# Patient Record
Sex: FEMALE | Race: WHITE | NOT HISPANIC OR LATINO | Employment: OTHER | URBAN - NONMETROPOLITAN AREA
[De-identification: names, ages, dates, MRNs, and addresses within clinical notes are randomized per-mention and may not be internally consistent; named-entity substitution may affect disease eponyms.]

---

## 2017-03-20 ENCOUNTER — HOSPITAL ENCOUNTER (EMERGENCY)
Facility: HOSPITAL | Age: 72
Discharge: HOME | End: 2017-03-20
Attending: EMERGENCY MEDICINE | Admitting: EMERGENCY MEDICINE
Payer: MEDICARE

## 2017-03-20 VITALS
RESPIRATION RATE: 16 BRPM | OXYGEN SATURATION: 94 % | HEART RATE: 87 BPM | SYSTOLIC BLOOD PRESSURE: 136 MMHG | TEMPERATURE: 98.7 F | DIASTOLIC BLOOD PRESSURE: 77 MMHG

## 2017-03-20 DIAGNOSIS — M89.8X1 PAIN OF RIGHT SCAPULA: ICD-10-CM

## 2017-03-20 DIAGNOSIS — R07.89 CHEST WALL PAIN: ICD-10-CM

## 2017-03-20 DIAGNOSIS — G89.18 POSTOPERATIVE PAIN: ICD-10-CM

## 2017-03-20 LAB
ALBUMIN SERPL-MCNC: 3.3 G/DL (ref 3.4–5)
ALP SERPL-CCNC: 83 U/L (ref 40–150)
ALT SERPL W P-5'-P-CCNC: 27 U/L (ref 0–50)
ANION GAP SERPL CALCULATED.3IONS-SCNC: 10 MMOL/L (ref 3–14)
AST SERPL W P-5'-P-CCNC: 15 U/L (ref 0–45)
BASOPHILS # BLD AUTO: 0 10E9/L (ref 0–0.2)
BASOPHILS NFR BLD AUTO: 0.2 %
BILIRUB SERPL-MCNC: 0.3 MG/DL (ref 0.2–1.3)
BUN SERPL-MCNC: 27 MG/DL (ref 7–30)
CALCIUM SERPL-MCNC: 8.8 MG/DL (ref 8.5–10.1)
CHLORIDE SERPL-SCNC: 107 MMOL/L (ref 94–109)
CO2 SERPL-SCNC: 22 MMOL/L (ref 20–32)
CREAT SERPL-MCNC: 1.31 MG/DL (ref 0.52–1.04)
CRP SERPL-MCNC: 12.8 MG/L (ref 0–8)
D DIMER PPP DDU-MCNC: 241 NG/ML D-DU (ref 0–300)
DIFFERENTIAL METHOD BLD: ABNORMAL
EOSINOPHIL # BLD AUTO: 0.3 10E9/L (ref 0–0.7)
EOSINOPHIL NFR BLD AUTO: 4.1 %
ERYTHROCYTE [DISTWIDTH] IN BLOOD BY AUTOMATED COUNT: 14.2 % (ref 10–15)
GFR SERPL CREATININE-BSD FRML MDRD: 40 ML/MIN/1.7M2
GLUCOSE SERPL-MCNC: 174 MG/DL (ref 70–99)
HCT VFR BLD AUTO: 32.6 % (ref 35–47)
HGB BLD-MCNC: 10.3 G/DL (ref 11.7–15.7)
IMM GRANULOCYTES # BLD: 0 10E9/L (ref 0–0.4)
IMM GRANULOCYTES NFR BLD: 0.5 %
LYMPHOCYTES # BLD AUTO: 2.2 10E9/L (ref 0.8–5.3)
LYMPHOCYTES NFR BLD AUTO: 27.5 %
MCH RBC QN AUTO: 25.1 PG (ref 26.5–33)
MCHC RBC AUTO-ENTMCNC: 31.6 G/DL (ref 31.5–36.5)
MCV RBC AUTO: 79 FL (ref 78–100)
MONOCYTES # BLD AUTO: 0.6 10E9/L (ref 0–1.3)
MONOCYTES NFR BLD AUTO: 7.3 %
NEUTROPHILS # BLD AUTO: 4.9 10E9/L (ref 1.6–8.3)
NEUTROPHILS NFR BLD AUTO: 60.4 %
NRBC # BLD AUTO: 0 10*3/UL
NRBC BLD AUTO-RTO: 0 /100
NT-PROBNP SERPL-MCNC: 55 PG/ML (ref 0–900)
PLATELET # BLD AUTO: 146 10E9/L (ref 150–450)
POTASSIUM SERPL-SCNC: 4 MMOL/L (ref 3.4–5.3)
PROT SERPL-MCNC: 7.4 G/DL (ref 6.8–8.8)
RBC # BLD AUTO: 4.11 10E12/L (ref 3.8–5.2)
SODIUM SERPL-SCNC: 139 MMOL/L (ref 133–144)
TROPONIN I SERPL-MCNC: NORMAL UG/L (ref 0–0.04)
WBC # BLD AUTO: 8.1 10E9/L (ref 4–11)

## 2017-03-20 PROCEDURE — 36415 COLL VENOUS BLD VENIPUNCTURE: CPT | Performed by: EMERGENCY MEDICINE

## 2017-03-20 PROCEDURE — 85379 FIBRIN DEGRADATION QUANT: CPT | Performed by: EMERGENCY MEDICINE

## 2017-03-20 PROCEDURE — 99285 EMERGENCY DEPT VISIT HI MDM: CPT | Mod: 25

## 2017-03-20 PROCEDURE — 85025 COMPLETE CBC W/AUTO DIFF WBC: CPT | Performed by: EMERGENCY MEDICINE

## 2017-03-20 PROCEDURE — 86140 C-REACTIVE PROTEIN: CPT | Performed by: EMERGENCY MEDICINE

## 2017-03-20 PROCEDURE — 93005 ELECTROCARDIOGRAM TRACING: CPT

## 2017-03-20 PROCEDURE — 25000128 H RX IP 250 OP 636: Performed by: EMERGENCY MEDICINE

## 2017-03-20 PROCEDURE — 96361 HYDRATE IV INFUSION ADD-ON: CPT

## 2017-03-20 PROCEDURE — 80053 COMPREHEN METABOLIC PANEL: CPT | Performed by: EMERGENCY MEDICINE

## 2017-03-20 PROCEDURE — 93010 ELECTROCARDIOGRAM REPORT: CPT | Performed by: INTERNAL MEDICINE

## 2017-03-20 PROCEDURE — 96375 TX/PRO/DX INJ NEW DRUG ADDON: CPT

## 2017-03-20 PROCEDURE — 83880 ASSAY OF NATRIURETIC PEPTIDE: CPT | Performed by: EMERGENCY MEDICINE

## 2017-03-20 PROCEDURE — 71020 ZZHC CHEST TWO VIEWS, FRONT/LAT: CPT | Mod: TC

## 2017-03-20 PROCEDURE — 84484 ASSAY OF TROPONIN QUANT: CPT | Performed by: EMERGENCY MEDICINE

## 2017-03-20 PROCEDURE — 99285 EMERGENCY DEPT VISIT HI MDM: CPT | Performed by: EMERGENCY MEDICINE

## 2017-03-20 PROCEDURE — 96374 THER/PROPH/DIAG INJ IV PUSH: CPT

## 2017-03-20 RX ORDER — DIPHENHYDRAMINE HYDROCHLORIDE 50 MG/ML
25 INJECTION INTRAMUSCULAR; INTRAVENOUS ONCE
Status: COMPLETED | OUTPATIENT
Start: 2017-03-20 | End: 2017-03-20

## 2017-03-20 RX ORDER — DIAZEPAM 10 MG/2ML
2.5 INJECTION, SOLUTION INTRAMUSCULAR; INTRAVENOUS ONCE
Status: COMPLETED | OUTPATIENT
Start: 2017-03-20 | End: 2017-03-20

## 2017-03-20 RX ORDER — LIDOCAINE 40 MG/G
CREAM TOPICAL
Status: DISCONTINUED | OUTPATIENT
Start: 2017-03-20 | End: 2017-03-20 | Stop reason: HOSPADM

## 2017-03-20 RX ORDER — DIAZEPAM 5 MG
5 TABLET ORAL EVERY 12 HOURS PRN
Qty: 12 TABLET | Refills: 0 | Status: SHIPPED | OUTPATIENT
Start: 2017-03-20 | End: 2017-05-20

## 2017-03-20 RX ORDER — SODIUM CHLORIDE 9 MG/ML
1000 INJECTION, SOLUTION INTRAVENOUS CONTINUOUS
Status: DISCONTINUED | OUTPATIENT
Start: 2017-03-20 | End: 2017-03-20 | Stop reason: HOSPADM

## 2017-03-20 RX ORDER — KETOROLAC TROMETHAMINE 15 MG/ML
15 INJECTION, SOLUTION INTRAMUSCULAR; INTRAVENOUS ONCE
Status: COMPLETED | OUTPATIENT
Start: 2017-03-20 | End: 2017-03-20

## 2017-03-20 RX ORDER — HYDROMORPHONE HYDROCHLORIDE 2 MG/1
2 TABLET ORAL EVERY 6 HOURS PRN
Qty: 15 TABLET | Refills: 0 | Status: SHIPPED | OUTPATIENT
Start: 2017-03-20 | End: 2017-05-20

## 2017-03-20 RX ORDER — METOCLOPRAMIDE HYDROCHLORIDE 5 MG/ML
5 INJECTION INTRAMUSCULAR; INTRAVENOUS ONCE
Status: COMPLETED | OUTPATIENT
Start: 2017-03-20 | End: 2017-03-20

## 2017-03-20 RX ORDER — HYDROMORPHONE HYDROCHLORIDE 1 MG/ML
0.5 INJECTION, SOLUTION INTRAMUSCULAR; INTRAVENOUS; SUBCUTANEOUS
Status: DISCONTINUED | OUTPATIENT
Start: 2017-03-20 | End: 2017-03-20 | Stop reason: HOSPADM

## 2017-03-20 RX ADMIN — KETOROLAC TROMETHAMINE 15 MG: 15 INJECTION, SOLUTION INTRAMUSCULAR; INTRAVENOUS at 08:32

## 2017-03-20 RX ADMIN — METOCLOPRAMIDE 5 MG: 5 INJECTION, SOLUTION INTRAMUSCULAR; INTRAVENOUS at 08:34

## 2017-03-20 RX ADMIN — DIPHENHYDRAMINE HYDROCHLORIDE 25 MG: 50 INJECTION, SOLUTION INTRAMUSCULAR; INTRAVENOUS at 08:36

## 2017-03-20 RX ADMIN — SODIUM CHLORIDE 500 ML: 9 INJECTION, SOLUTION INTRAVENOUS at 08:29

## 2017-03-20 RX ADMIN — DIAZEPAM 2.5 MG: 5 INJECTION, SOLUTION INTRAMUSCULAR; INTRAVENOUS at 08:31

## 2017-03-20 ASSESSMENT — ENCOUNTER SYMPTOMS
ACTIVITY CHANGE: 1
NEUROLOGICAL NEGATIVE: 1
GASTROINTESTINAL NEGATIVE: 1
BACK PAIN: 1
MYALGIAS: 1
CHEST TIGHTNESS: 1
EYES NEGATIVE: 1

## 2017-03-20 NOTE — DISCHARGE INSTRUCTIONS
Pain Management After Surgery  Once you re home after surgery, you may have some pain, since even minor surgery causes swelling and breakdown of tissue. When it comes to effective pain management, the tips you may have learned in the hospital also work at home. To get the best pain relief possible, remember these points:  Special note: Be sure to follow any specific post-operative instructions from your surgeon or nurse.     Use your medicine only as directed    If your pain is not relieved or if it gets worse, call your health care provider.    If pain lessens, try taking your medicine less often or in smaller doses.  Remember that medicines need time to work    Most pain relievers taken by mouth need at least 20 to 30 minutes to take effect. They may not reach their maximum effect for close to an hour.     Take pain medicine at regular times as directed. Don t wait until the pain gets bad to take it.  Time your medicine    Try to time your medicine so that you take it before beginning an activity, such as dressing or sitting at the table for dinner.    Taking your medicine at night may help you get a good night s rest.  Eat lots of fruit and vegetables    Constipation is a common side effect with some pain medicines. Eating fruit, vegetables, and other foods high in fiber can help.    Drink lots of fluids.    Talk to your health care provider about taking a preventive bowel regimen.  Avoid drinking alcohol while taking pain medicine    Mixing alcohol and pain medicine can cause dizziness and slow your respiratory system. It can even be fatal.  Avoid driving or operating machinery while taking pain medicines that can cause drowsiness.    1654-7633 [employer]. [address] [place] [address]. All rights reserved. This information is not intended as a substitute for professional medical care. Always follow your healthcare professional's instructions.      Cee   We checked out your  persistent right scapular and upper back pain since your cervical disc surgery.  There is no evidence that this is a blood clot, pneumonia, or heart condition.  It most likely represents one of the nerves being tweaked at surgery and will probably take 4-6 weeks to fully clear.  We have changed your pain meds and hopefully the neurosurgeon will be able to clarify the pain for you and the more specifci prognosis.

## 2017-03-20 NOTE — ED AVS SNAPSHOT
[facility]    [address] [address]    Phone:  [phone]                                       Cee Pereira   MRN: [id number]    Department:  [facility]   Date of Visit:  3/20/2017           After Visit Summary Signature Page     I have received my discharge instructions, and my questions have been answered. I have discussed any challenges I see with this plan with the nurse or doctor.    ..........................................................................................................................................  Patient/Patient Representative Signature      ..........................................................................................................................................  Patient Representative Print Name and Relationship to Patient    ..................................................               ................................................  Date                                            Time    ..........................................................................................................................................  Reviewed by Signature/Title    ...................................................              ..............................................  Date                                                            Time

## 2017-03-20 NOTE — ED NOTES
"Presents to ER for c/o right shoulder pain that started on Thurs.  Pt had cervical surgery last Tues at [facility], pt is not sure of 's name \"Dr Sam.\"  In gown, call light in reach.  "

## 2017-03-20 NOTE — ED PROVIDER NOTES
"  History     Chief Complaint   Patient presents with     Neck Pain     HPI  Cee Pereira is a 71 year old female who had C 6-7 herniated disc causing her significant upper extremity preop pains into back and arms.  She is now 6 days post laminectomy and HNP surgery and has noted right scapula burning pain for the past 4 days.  She has become worried about \"blood clot\" or pneumonia so presents for eval.  Denies any leg swelling or pain or respiratory symptoms.      I have reviewed the Medications, Allergies, Past Medical and Surgical History, and Social History in the Epic system.    Review of Systems   Constitutional: Positive for activity change.   HENT: Negative.    Eyes: Negative.    Respiratory: Positive for chest tightness.    Cardiovascular: Positive for chest pain.   Gastrointestinal: Negative.    Genitourinary: Negative.    Musculoskeletal: Positive for back pain and myalgias.   Skin: Negative.    Neurological: Negative.        Physical Exam   BP: 136/83  Heart Rate: 92  Temp: 98.7  F (37.1  C)  Resp: 19  SpO2: 97 %  Physical Exam   Constitutional: She appears well-developed and well-nourished. She appears distressed.   Fairly guarded in her positioning and movements with anterior cervical scar and steristrips looking clean and dry   HENT:   Head: Normocephalic and atraumatic.   Eyes: Conjunctivae are normal. Pupils are equal, round, and reactive to light.   Neck: Normal range of motion. Neck supple. No tracheal deviation present. No thyromegaly present.   Localized tenderness along the medial edge of right scapula and suprascapularis muscle.    Cardiovascular: Normal rate and regular rhythm.    Pulmonary/Chest: Effort normal and breath sounds normal. No respiratory distress. She has no wheezes. She has no rales.   Musculoskeletal: Normal range of motion. She exhibits no edema or tenderness.   Dorsal tenderness between scapula as described.    Neurological: She is alert.   Skin: She is not diaphoretic. "     ED Course     ED Course     Procedures  ECG  Normal sinus rhythm, normal ECG, QTc 444ms   Critical Care time:  none    Labs Ordered and Resulted from Time of ED Arrival Up to the Time of Departure from the ED   CBC WITH PLATELETS DIFFERENTIAL - Abnormal; Notable for the following:        Result Value    Hemoglobin 10.3 (*)     Hematocrit 32.6 (*)     MCH 25.1 (*)     Platelet Count 146 (*)     All other components within normal limits   CRP INFLAMMATION - Abnormal; Notable for the following:     CRP Inflammation 12.8 (*)     All other components within normal limits   COMPREHENSIVE METABOLIC PANEL - Abnormal; Notable for the following:     Glucose 174 (*)     Creatinine 1.31 (*)     GFR Estimate 40 (*)     GFR Estimate If Black 49 (*)     Albumin 3.3 (*)     All other components within normal limits   D-DIMER (FV RANGE)   TROPONIN I   NT PROBNP INPATIENT   PERIPHERAL IV CATHETER     Assessments & Plan (with Medical Decision Making)   Cee has a fairly typical scapular nerve irritation 6 days s/p cervical laminectomy/HNP surgery but alternative dx such as PE, pneumonia, Px, and cardiac etiologies were evaluated and all found to be negative.  IV was placed with Cee receiving toradol 15mg + valium 2.5mg IV for pain relief and reglan/benadryl for mild nausea from her meds.  She felt better and was reassured and will be seeing her neurosurgeon in 3 days to go over this scapular pain issue and its prognosis for healing.   I have reviewed the nursing notes.    I have reviewed the findings, diagnosis, plan and need for follow up with the patient.    New Prescriptions    DIAZEPAM (VALIUM) 5 MG TABLET    Take 1 tablet (5 mg) by mouth every 12 hours as needed for anxiety or sleep    HYDROMORPHONE (DILAUDID) 2 MG TABLET    Take 1 tablet (2 mg) by mouth every 6 hours as needed for pain maximum 4 tablet(s) per day       Final diagnoses:   Postoperative pain   Pain of right scapula   Chest wall pain       3/20/2017   [facility]     Madi Gomez MD  03/20/17 2228       Madi Gomez MD  03/20/17 3880

## 2017-03-20 NOTE — PROGRESS NOTES
Cee Pereira 71 year old    Returned from X-Ray  Exam performed: CXR  Tolerated Procedure: well  May resume previous diet: Yes  Pushed to radiologist reading service? Not applicable  Time returned to unit: 9:09 AM  Handoff Method: Call Light on and in reach of patient   Was patient held? NO  If yes, by whom? NA and Technologist NAME JRT  3/20/2017 9:10 AM  Kiel Ma

## 2017-03-20 NOTE — ED NOTES
Discharge instructions reviewed with patient, and Rx x2 given to patient. Patient verbalized understanding. Pt ambulated with a steady gait to the exit.

## 2017-03-20 NOTE — ED AVS SNAPSHOT
[facility]    [address] [address]    Phone:  [phone]                                       Cee Pereira   MRN: [id number]    Department:  [facility]   Date of Visit:  3/20/2017           Patient Information     Date Of Birth          1945        Your diagnoses for this visit were:     Postoperative pain     Pain of right scapula     Chest wall pain        You were seen by Madi Gomez MD.      Follow-up Information     Follow up with Matthew Villela DO.    Specialty:  Family Practice    Why:  As needed    Contact information:    [facility]  [address] [employer] [address]  [phone]          Discharge Instructions         Pain Management After Surgery  Once you re home after surgery, you may have some pain, since even minor surgery causes swelling and breakdown of tissue. When it comes to effective pain management, the tips you may have learned in the hospital also work at home. To get the best pain relief possible, remember these points:  Special note: Be sure to follow any specific post-operative instructions from your surgeon or nurse.     Use your medicine only as directed    If your pain is not relieved or if it gets worse, call your health care provider.    If pain lessens, try taking your medicine less often or in smaller doses.  Remember that medicines need time to work    Most pain relievers taken by mouth need at least 20 to 30 minutes to take effect. They may not reach their maximum effect for close to an hour.     Take pain medicine at regular times as directed. Don t wait until the pain gets bad to take it.  Time your medicine    Try to time your medicine so that you take it before beginning an activity, such as dressing or sitting at the table for dinner.    Taking your medicine at night may help you get a good night s rest.  Eat lots of fruit and vegetables    Constipation is a common side effect with some  pain medicines. Eating fruit, vegetables, and other foods high in fiber can help.    Drink lots of fluids.    Talk to your health care provider about taking a preventive bowel regimen.  Avoid drinking alcohol while taking pain medicine    Mixing alcohol and pain medicine can cause dizziness and slow your respiratory system. It can even be fatal.  Avoid driving or operating machinery while taking pain medicines that can cause drowsiness.    2047-1606 [employer]. [address] [place] [address], [place] [address]. All rights reserved. This information is not intended as a substitute for professional medical care. Always follow your healthcare professional's instructions.      Cee,   We checked out your persistent right scapular and upper back pain since your cervical disc surgery.  There is no evidence that this is a blood clot, pneumonia, or heart condition.  It most likely represents one of the nerves being tweaked at surgery and will probably take 4-6 weeks to fully clear.  We have changed your pain meds and hopefully the neurosurgeon will be able to clarify the pain for you and the more specifci prognosis.        Review of your medicines      START taking        Dose / Directions Last dose taken    diazepam 5 MG tablet   Commonly known as:  VALIUM   Dose:  5 mg   Quantity:  12 tablet        Take 1 tablet (5 mg) by mouth every 12 hours as needed for anxiety or sleep   Refills:  0        HYDROmorphone 2 MG tablet   Commonly known as:  DILAUDID   Dose:  2 mg   Quantity:  15 tablet        Take 1 tablet (2 mg) by mouth every 6 hours as needed for pain maximum 4 tablet(s) per day   Refills:  0          Our records show that you are taking the medicines listed below. If these are incorrect, please call your family doctor or clinic.        Dose / Directions Last dose taken    aspirin-dipyridamole  MG per 12 hr capsule   Commonly known as:  AGGRENOX   Dose:  1 capsule        Take 1 capsule by mouth daily    Refills:  0        CELEBREX PO   Dose:  200 mg        Take 200 mg by mouth 2 times daily   Refills:  0        COQ-10 PO   Dose:  100 mg        Take 100 mg by mouth daily   Refills:  0        DOCUSATE SODIUM PO   Dose:  100 mg        Take 100 mg by mouth daily   Refills:  0        * GABAPENTIN PO   Dose:  300 mg        Take 300 mg by mouth 2 times daily AM, second dose at 1200   Refills:  0        * GABAPENTIN PO   Dose:  600 mg        Take 600 mg by mouth At Bedtime   Refills:  0        hydroxychloroquine 200 MG tablet   Commonly known as:  PLAQUENIL   Dose:  200 mg        Take 200 mg by mouth 2 times daily   Refills:  0        METFORMIN HCL PO   Dose:  1000 mg        Take 1,000 mg by mouth 2 times daily (with meals)   Refills:  0        METHOCARBAMOL PO   Dose:  750 mg        Take 750 mg by mouth   Refills:  0        METOPROLOL TARTRATE PO   Dose:  25 mg        Take 25 mg by mouth daily   Refills:  0        oxyCODONE-acetaminophen 7.5-325 MG per tablet   Commonly known as:  PERCOCET   Dose:  2 tablet   Quantity:  30 tablet        Take 2 tablets by mouth every 4 hours as needed   Refills:  0        PANTOPRAZOLE SODIUM PO   Dose:  40 mg        Take 40 mg by mouth every morning (before breakfast)   Refills:  0        SIMVASTATIN PO   Dose:  10 mg        Take 10 mg by mouth daily   Refills:  0        TRAMADOL HCL PO   Dose:  50 mg        Take 50 mg by mouth every 6 hours as needed for moderate to severe pain   Refills:  0        * Notice:  This list has 2 medication(s) that are the same as other medications prescribed for you. Read the directions carefully, and ask your doctor or other care provider to review them with you.            Prescriptions were sent or printed at these locations (2 Prescriptions)                   Other Prescriptions                Printed at Department/Unit printer (2 of 2)         HYDROmorphone (DILAUDID) 2 MG tablet               diazepam (VALIUM) 5 MG tablet                Procedures and  "tests performed during your visit     CBC with platelets differential    CRP inflammation    Comprehensive metabolic panel    D-Dimer (FV Range)    EKG 12 lead    Nt probnp inpatient (BNP)    Peripheral IV catheter    Troponin I    XR Chest 2 Views      Orders Needing Specimen Collection     None      Pending Results     Date and Time Order Name Status Description    3/20/2017 0820 XR Chest 2 Views Preliminary             Pending Culture Results     No orders found from 3/18/2017 to 3/21/2017.            Thank you for choosing [facility]       Thank you for choosing [facility] for your care. Our goal is always to provide you with excellent care. Hearing back from our patients is one way we can continue to improve our services. Please take a few minutes to complete the written survey that you may receive in the mail after you visit with us. Thank you!        [employer] Information     [employer] lets you send messages to your doctor, view your test results, renew your prescriptions, schedule appointments and more. To sign up, go to [email or web address] . Click on \"Log in\" on the left side of the screen, which will take you to the Welcome page. Then click on \"Sign up Now\" on the right side of the page.     You will be asked to enter the access code listed below, as well as some personal information. Please follow the directions to create your username and password.     Your access code is: [email or web address]  Expires: 3/29/2017  9:31 PM     Your access code will  in 90 days. If you need help or a new code, please call your [facility] clinic or [phone].        Care EveryWhere ID     This is your Care EveryWhere ID. This could be used by other organizations to access your [facility] medical records  [id number]        After Visit Summary       This is your record. Keep this with you and show to your community pharmacist(s) and doctor(s) at your next visit.                  "

## 2017-05-20 ENCOUNTER — HOSPITAL ENCOUNTER (EMERGENCY)
Facility: HOSPITAL | Age: 72
Discharge: HOME | End: 2017-05-20
Attending: FAMILY MEDICINE | Admitting: FAMILY MEDICINE
Payer: MEDICARE

## 2017-05-20 VITALS
TEMPERATURE: 98.8 F | HEART RATE: 98 BPM | OXYGEN SATURATION: 92 % | DIASTOLIC BLOOD PRESSURE: 70 MMHG | RESPIRATION RATE: 20 BRPM | SYSTOLIC BLOOD PRESSURE: 134 MMHG

## 2017-05-20 DIAGNOSIS — R10.84 ABDOMINAL PAIN, GENERALIZED: ICD-10-CM

## 2017-05-20 DIAGNOSIS — R07.9 CHEST PAIN, UNSPECIFIED TYPE: ICD-10-CM

## 2017-05-20 DIAGNOSIS — R19.7 DIARRHEA, UNSPECIFIED TYPE: ICD-10-CM

## 2017-05-20 LAB
ALBUMIN SERPL-MCNC: 3.9 G/DL (ref 3.4–5)
ALBUMIN UR-MCNC: 30 MG/DL
ALP SERPL-CCNC: 100 U/L (ref 40–150)
ALT SERPL W P-5'-P-CCNC: 61 U/L (ref 0–50)
ANION GAP SERPL CALCULATED.3IONS-SCNC: 10 MMOL/L (ref 3–14)
APPEARANCE UR: CLEAR
AST SERPL W P-5'-P-CCNC: 65 U/L (ref 0–45)
BACTERIA #/AREA URNS HPF: ABNORMAL /HPF
BASOPHILS # BLD AUTO: 0 10E9/L (ref 0–0.2)
BASOPHILS NFR BLD AUTO: 0.2 %
BILIRUB SERPL-MCNC: 0.4 MG/DL (ref 0.2–1.3)
BILIRUB UR QL STRIP: NEGATIVE
BUN SERPL-MCNC: 22 MG/DL (ref 7–30)
C DIFF TOX B STL QL: NORMAL
CALCIUM SERPL-MCNC: 9.5 MG/DL (ref 8.5–10.1)
CHLORIDE SERPL-SCNC: 105 MMOL/L (ref 94–109)
CO2 SERPL-SCNC: 22 MMOL/L (ref 20–32)
COLOR UR AUTO: YELLOW
CREAT SERPL-MCNC: 1.74 MG/DL (ref 0.52–1.04)
DIFFERENTIAL METHOD BLD: ABNORMAL
EOSINOPHIL # BLD AUTO: 0.1 10E9/L (ref 0–0.7)
EOSINOPHIL NFR BLD AUTO: 1.3 %
ERYTHROCYTE [DISTWIDTH] IN BLOOD BY AUTOMATED COUNT: 14.2 % (ref 10–15)
G LAMBLIA AG STL QL IA: NORMAL
G LAMBLIA+CRYPTOSP AG STL QL IA: NORMAL
GFR SERPL CREATININE-BSD FRML MDRD: 29 ML/MIN/1.7M2
GLUCOSE SERPL-MCNC: 287 MG/DL (ref 70–99)
GLUCOSE UR STRIP-MCNC: 300 MG/DL
HCT VFR BLD AUTO: 36.6 % (ref 35–47)
HGB BLD-MCNC: 11.2 G/DL (ref 11.7–15.7)
HGB UR QL STRIP: NEGATIVE
IMM GRANULOCYTES # BLD: 0.1 10E9/L (ref 0–0.4)
IMM GRANULOCYTES NFR BLD: 0.5 %
KETONES UR STRIP-MCNC: NEGATIVE MG/DL
LEUKOCYTE ESTERASE UR QL STRIP: ABNORMAL
LYMPHOCYTES # BLD AUTO: 0.6 10E9/L (ref 0.8–5.3)
LYMPHOCYTES NFR BLD AUTO: 6.7 %
MCH RBC QN AUTO: 23.3 PG (ref 26.5–33)
MCHC RBC AUTO-ENTMCNC: 30.6 G/DL (ref 31.5–36.5)
MCV RBC AUTO: 76 FL (ref 78–100)
MICRO REPORT STATUS: NORMAL
MICRO REPORT STATUS: NORMAL
MONOCYTES # BLD AUTO: 0.6 10E9/L (ref 0–1.3)
MONOCYTES NFR BLD AUTO: 6.4 %
NEUTROPHILS # BLD AUTO: 7.8 10E9/L (ref 1.6–8.3)
NEUTROPHILS NFR BLD AUTO: 84.9 %
NITRATE UR QL: NEGATIVE
NRBC # BLD AUTO: 0 10*3/UL
NRBC BLD AUTO-RTO: 0 /100
PH UR STRIP: 5 PH (ref 4.7–8)
PLATELET # BLD AUTO: 146 10E9/L (ref 150–450)
POTASSIUM SERPL-SCNC: 4.5 MMOL/L (ref 3.4–5.3)
PROT SERPL-MCNC: 8.6 G/DL (ref 6.8–8.8)
RBC # BLD AUTO: 4.81 10E12/L (ref 3.8–5.2)
RBC #/AREA URNS AUTO: 1 /HPF (ref 0–2)
SODIUM SERPL-SCNC: 137 MMOL/L (ref 133–144)
SP GR UR STRIP: 1.01 (ref 1–1.03)
SPECIMEN SOURCE: NORMAL
TROPONIN I SERPL-MCNC: NORMAL UG/L (ref 0–0.04)
URN SPEC COLLECT METH UR: ABNORMAL
UROBILINOGEN UR STRIP-MCNC: NORMAL MG/DL (ref 0–2)
WBC # BLD AUTO: 9.1 10E9/L (ref 4–11)
WBC #/AREA URNS AUTO: 9 /HPF (ref 0–2)

## 2017-05-20 PROCEDURE — 25000128 H RX IP 250 OP 636: Performed by: FAMILY MEDICINE

## 2017-05-20 PROCEDURE — 85025 COMPLETE CBC W/AUTO DIFF WBC: CPT | Performed by: FAMILY MEDICINE

## 2017-05-20 PROCEDURE — 99285 EMERGENCY DEPT VISIT HI MDM: CPT | Performed by: FAMILY MEDICINE

## 2017-05-20 PROCEDURE — 87045 FECES CULTURE AEROBIC BACT: CPT | Performed by: FAMILY MEDICINE

## 2017-05-20 PROCEDURE — 87046 STOOL CULTR AEROBIC BACT EA: CPT | Performed by: FAMILY MEDICINE

## 2017-05-20 PROCEDURE — 87328 CRYPTOSPORIDIUM AG IA: CPT | Performed by: FAMILY MEDICINE

## 2017-05-20 PROCEDURE — 36415 COLL VENOUS BLD VENIPUNCTURE: CPT | Performed by: FAMILY MEDICINE

## 2017-05-20 PROCEDURE — 99285 EMERGENCY DEPT VISIT HI MDM: CPT | Mod: 25

## 2017-05-20 PROCEDURE — 81001 URINALYSIS AUTO W/SCOPE: CPT | Performed by: FAMILY MEDICINE

## 2017-05-20 PROCEDURE — 87329 GIARDIA AG IA: CPT | Performed by: FAMILY MEDICINE

## 2017-05-20 PROCEDURE — 80053 COMPREHEN METABOLIC PANEL: CPT | Performed by: FAMILY MEDICINE

## 2017-05-20 PROCEDURE — 84484 ASSAY OF TROPONIN QUANT: CPT | Performed by: FAMILY MEDICINE

## 2017-05-20 PROCEDURE — 93005 ELECTROCARDIOGRAM TRACING: CPT

## 2017-05-20 PROCEDURE — 74176 CT ABD & PELVIS W/O CONTRAST: CPT | Mod: TC

## 2017-05-20 PROCEDURE — 96374 THER/PROPH/DIAG INJ IV PUSH: CPT

## 2017-05-20 PROCEDURE — 87493 C DIFF AMPLIFIED PROBE: CPT | Performed by: FAMILY MEDICINE

## 2017-05-20 PROCEDURE — 96361 HYDRATE IV INFUSION ADD-ON: CPT

## 2017-05-20 RX ORDER — ONDANSETRON 2 MG/ML
4 INJECTION INTRAMUSCULAR; INTRAVENOUS EVERY 30 MIN PRN
Status: DISCONTINUED | OUTPATIENT
Start: 2017-05-20 | End: 2017-05-20 | Stop reason: HOSPADM

## 2017-05-20 RX ORDER — SODIUM CHLORIDE 9 MG/ML
1000 INJECTION, SOLUTION INTRAVENOUS CONTINUOUS
Status: DISCONTINUED | OUTPATIENT
Start: 2017-05-20 | End: 2017-05-20 | Stop reason: HOSPADM

## 2017-05-20 RX ADMIN — ONDANSETRON 4 MG: 2 INJECTION, SOLUTION INTRAMUSCULAR; INTRAVENOUS at 02:51

## 2017-05-20 RX ADMIN — SODIUM CHLORIDE 1000 ML: 9 INJECTION, SOLUTION INTRAVENOUS at 04:01

## 2017-05-20 RX ADMIN — SODIUM CHLORIDE 1000 ML: 9 INJECTION, SOLUTION INTRAVENOUS at 02:51

## 2017-05-20 ASSESSMENT — ENCOUNTER SYMPTOMS
FATIGUE: 0
NEUROLOGICAL NEGATIVE: 1
MUSCULOSKELETAL NEGATIVE: 1
COUGH: 0
ENDOCRINE NEGATIVE: 1
UNEXPECTED WEIGHT CHANGE: 0
CHILLS: 0
HEMATOLOGIC/LYMPHATIC NEGATIVE: 1
NAUSEA: 1
STRIDOR: 0
APPETITE CHANGE: 1
ACTIVITY CHANGE: 0
CHOKING: 0
ABDOMINAL PAIN: 1
EYES NEGATIVE: 1
ALLERGIC/IMMUNOLOGIC NEGATIVE: 1
CHEST TIGHTNESS: 1
PSYCHIATRIC NEGATIVE: 1
DIARRHEA: 1
SHORTNESS OF BREATH: 1
DIAPHORESIS: 0
FEVER: 0
APNEA: 0
WHEEZING: 0

## 2017-05-20 NOTE — ED AVS SNAPSHOT
[facility]    [address] [address]    Phone:  [phone]                                       Cee Pereira   MRN: [id number]    Department[facility]  [facility]   Date of Visit:  5/20/2017           Patient Information     Date Of Birth          1945        Your diagnoses for this visit were:     Diarrhea, unspecified type     Abdominal pain, generalized     Chest pain, unspecified type        You were seen by Peg Wilson DO.      Follow-up Information     Schedule an appointment as soon as possible for a visit with Matthew Villela DO.    Specialty:  Family Practice    Contact information:    [facility]  [address] [place] [address]  [phone]          Discharge Instructions       Push fluids and bland diet for the next 2 or 3 days.  Rest.  Follow up with your doctor next week.  Check your glucose levels at least 3 times a day for the next 2 or 3 days.  Return to the ER if your symptoms become acutely worse (severe pain, fever, vomiting, bloody diarrhea, feeling faint and dizzy).    Discharge References/Attachments     DIARRHEA, TREATING (ENGLISH)    DIARRHEA, UNK CAUSE (ADULT) (ENGLISH)         Review of your medicines      Our records show that you are taking the medicines listed below. If these are incorrect, please call your family doctor or clinic.        Dose / Directions Last dose taken    aspirin-dipyridamole  MG per 12 hr capsule   Commonly known as:  AGGRENOX   Dose:  1 capsule        Take 1 capsule by mouth daily   Refills:  0        CELEBREX PO   Dose:  200 mg        Take 200 mg by mouth 2 times daily   Refills:  0        COQ-10 PO   Dose:  100 mg        Take 100 mg by mouth daily   Refills:  0        DOCUSATE SODIUM PO   Dose:  100 mg        Take 100 mg by mouth daily   Refills:  0        * GABAPENTIN PO   Dose:  300 mg        Take 300 mg by mouth 2 times daily AM, second dose at 1200   Refills:  0        *  GABAPENTIN PO   Dose:  600 mg        Take 600 mg by mouth At Bedtime   Refills:  0        hydroxychloroquine 200 MG tablet   Commonly known as:  PLAQUENIL   Dose:  200 mg        Take 200 mg by mouth 2 times daily   Refills:  0        METFORMIN HCL PO   Dose:  1000 mg        Take 1,000 mg by mouth 2 times daily (with meals)   Refills:  0        METHOCARBAMOL PO   Dose:  750 mg        Take 750 mg by mouth   Refills:  0        METOPROLOL TARTRATE PO   Dose:  25 mg        Take 25 mg by mouth daily   Refills:  0        PANTOPRAZOLE SODIUM PO   Dose:  40 mg        Take 40 mg by mouth every morning (before breakfast)   Refills:  0        SIMVASTATIN PO   Dose:  10 mg        Take 10 mg by mouth daily   Refills:  0        TRAMADOL HCL PO   Dose:  50 mg        Take 50 mg by mouth every 6 hours as needed for moderate to severe pain   Refills:  0        * Notice:  This list has 2 medication(s) that are the same as other medications prescribed for you. Read the directions carefully, and ask your doctor or other care provider to review them with you.            Procedures and tests performed during your visit     CBC with platelets differential    CT Abdomen Pelvis w/o Contrast    Clostridium difficile toxin B PCR    Comprehensive metabolic panel    EKG 12-lead, tracing only    Giardia antigen    Ova and Parasite Screen    Stool culture SSCE    Troponin I    UA with Microscopic      Orders Needing Specimen Collection     None      Pending Results     Date and Time Order Name Status Description    5/20/2017 0340 CT Abdomen Pelvis w/o Contrast In process     5/20/2017 0211 Stool culture SSCE In process     5/20/2017 0211 Ova and Parasite Screen In process             Pending Culture Results     Date and Time Order Name Status Description    5/20/2017 0211 Stool culture SSCE In process     5/20/2017 0211 Ova and Parasite Screen In process             Thank you for choosing [facility]       Thank you for choosing [facility] for your  "care. Our goal is always to provide you with excellent care. Hearing back from our patients is one way we can continue to improve our services. Please take a few minutes to complete the written survey that you may receive in the mail after you visit with us. Thank you!        [employer] Information     [employer] lets you send messages to your doctor, view your test results, renew your prescriptions, schedule appointments and more. To sign up, go to [email or web address] . Click on \"Log in\" on the left side of the screen, which will take you to the Welcome page. Then click on \"Sign up Now\" on the right side of the page.     You will be asked to enter the access code listed below, as well as some personal information. Please follow the directions to create your username and password.     Your access code is: [email or web address]  Expires: 2017  5:52 AM     Your access code will  in 90 days. If you need help or a new code, please call your [facility] clinic or [phone].        Care EveryWhere ID     This is your Care EveryWhere ID. This could be used by other organizations to access your [facility] medical records  [id number]        After Visit Summary       This is your record. Keep this with you and show to your community pharmacist(s) and doctor(s) at your next visit.                  "

## 2017-05-20 NOTE — ED PROVIDER NOTES
History     Chief Complaint   Patient presents with     Abdominal Pain     hx small bowel obstructions     Chest Pain     chest hurts when pt lays down     HPI Comments: Diarrhea and abdominal cramping x 4 PM tonight. Pt states diarrhea is every 30 min to 1 hour. No blood in stool. Denies ill exposures or eating anything suspect or recent travel. Pt states she has a h/o bowel obstruction and has had a colectomy. Pt states she feels nauseated, no vomiting.   Pt noted some chest heaviness radiating to right shoulder about 5 hours ago and shortness of breath. Denies chest pain or trouble breathing at this time.  No recent illness, fever, cough, cold symptoms,injury. Pt states her appetite has been down the last few days. IDDM glucoses running in 120's usually, but tonight was 200's    The history is provided by the patient and the spouse. No  was used.     Cee Pereira is a 71 year old female who presents with above complaints    I have reviewed the Medications, Allergies, Past Medical and Surgical History, and Social History in the Epic system.    Review of Systems   Constitutional: Positive for appetite change. Negative for activity change, chills, diaphoresis, fatigue, fever and unexpected weight change.   HENT: Negative.    Eyes: Negative.    Respiratory: Positive for chest tightness and shortness of breath. Negative for apnea, cough, choking, wheezing and stridor.    Cardiovascular: Positive for chest pain.   Gastrointestinal: Positive for abdominal pain, diarrhea and nausea.   Endocrine: Negative.    Genitourinary: Negative.    Musculoskeletal: Negative.    Skin: Negative.    Allergic/Immunologic: Negative.    Neurological: Negative.    Hematological: Negative.    Psychiatric/Behavioral: Negative.        Physical Exam   BP: 134/78  Heart Rate: 101  Temp: 98.8  F (37.1  C)  Resp: 16  SpO2: 96 %  Physical Exam   Constitutional: She is oriented to person, place, and time. She appears  well-developed and well-nourished. No distress.   HENT:   Head: Normocephalic and atraumatic.   Right Ear: External ear normal.   Left Ear: External ear normal.   Nose: Nose normal.   Mouth/Throat: Oropharynx is clear and moist.   Eyes: Conjunctivae and EOM are normal. Pupils are equal, round, and reactive to light.   Neck: Normal range of motion. Neck supple.   Cardiovascular: Normal rate, regular rhythm, normal heart sounds and intact distal pulses.  Exam reveals no gallop and no friction rub.    No murmur heard.  Pulmonary/Chest: Effort normal and breath sounds normal. No respiratory distress. She has no wheezes. She has no rales. She exhibits no tenderness.   Abdominal: Soft. Bowel sounds are normal. She exhibits no distension and no mass. There is tenderness. There is no rebound and no guarding.   Generalized discomfort to palpation.   Musculoskeletal: Normal range of motion. She exhibits no edema, tenderness or deformity.   Neurological: She is alert and oriented to person, place, and time.   Skin: Skin is warm and dry. She is not diaphoretic.   Psychiatric: She has a normal mood and affect. Her behavior is normal.       ED Course     ED Course   Unremarkable ED course. Pt had 1 episode of diarrhea while in ER. Pain has improved some. Pt declines pain medicine.  Zofran 4 mg IV given for nausea.  Procedures             EKG Interpretation:      Interpreted by Peg Wilson  Time reviewed: immediately  Symptoms at time of EKG: None   Rhythm: normal sinus   Rate: Normal  Axis: Normal  Ectopy: none  Conduction: normal  ST Segments/ T Waves: No ST-T wave changes  Q Waves: none  Comparison to prior: Unchanged from 3/20/2017    Clinical Impression: normal EKG                Critical Care time:               Labs Ordered and Resulted from Time of ED Arrival Up to the Time of Departure from the ED   CBC WITH PLATELETS DIFFERENTIAL - Abnormal; Notable for the following:        Result Value    Hemoglobin 11.2 (*)      MCV 76 (*)     MCH 23.3 (*)     MCHC 30.6 (*)     Platelet Count 146 (*)     Absolute Lymphocytes 0.6 (*)     All other components within normal limits   COMPREHENSIVE METABOLIC PANEL - Abnormal; Notable for the following:     Glucose 287 (*)     Creatinine 1.74 (*)     GFR Estimate 29 (*)     GFR Estimate If Black 35 (*)     ALT 61 (*)     AST 65 (*)     All other components within normal limits   ROUTINE UA WITH MICROSCOPIC - Abnormal; Notable for the following:     Glucose Urine 300 (*)     Protein Albumin Urine 30 (*)     Leukocyte Esterase Urine Small (*)     WBC Urine 9 (*)     Bacteria Urine None (*)     All other components within normal limits   TROPONIN I   GIARDIA ANTIGEN   CLOSTRIDIUM DIFFICILE TOXIN B   OVA AND PARASITE SCREEN   STOOL CULTURE SSCE       Assessments & Plan (with Medical Decision Making)     I have reviewed the nursing notes.    I have reviewed the findings, diagnosis, plan and need for follow up with the patient.  Lab and CT results d/w pt.  Hemoglobin is low 11.2, creatinine elevated 1.74. Pt advised to push fluids, rest, bland diet. F/u with PCP next week.  Return to ER if symptoms b/c acutely worse.    New Prescriptions    No medications on file       Final diagnoses:   Diarrhea, unspecified type   Abdominal pain, generalized   Chest pain, unspecified type       5/20/2017   [facility]     Peg Wilson DO  05/20/17 0548

## 2017-05-20 NOTE — DISCHARGE INSTRUCTIONS
Push fluids and bland diet for the next 2 or 3 days.  Rest.  Follow up with your doctor next week.  Check your glucose levels at least 3 times a day for the next 2 or 3 days.  Return to the ER if your symptoms become acutely worse (severe pain, fever, vomiting, bloody diarrhea, feeling faint and dizzy).

## 2017-05-20 NOTE — ED NOTES
"Pt arrives with  and is in room 2. Pt states that around 1600 she started having diarrhea. Pt states that it was hourly until midnight and then every half hour.  Pt notes that she has a hx of diverticulitis and has had her colon removed and she always has loose stools. Pt also notes that she has a hx of bowel obstructions, but this pain today feels different. Pt states that pain is in the RUQ and LUQ. Pt also notes that she has had an appendectomy and a cholecystectomy. Pt rates pain 5/10, dull in nature, and pt is nauseated. Pt states that yesterday afternoon she also noted some chest discomfort, midsternal. Pt states that pain was intermittent, felt like a \"pressure\" and has subsided. Pt denies chest pain at this time.  at bedside and call light in reach.  "

## 2017-05-20 NOTE — ED AVS SNAPSHOT
[facility]    [address] [address]    Phone:  [phone]                                       Cee Pereira   MRN: [id number]    Department:  [facility]   Date of Visit:  5/20/2017           After Visit Summary Signature Page     I have received my discharge instructions, and my questions have been answered. I have discussed any challenges I see with this plan with the nurse or doctor.    ..........................................................................................................................................  Patient/Patient Representative Signature      ..........................................................................................................................................  Patient Representative Print Name and Relationship to Patient    ..................................................               ................................................  Date                                            Time    ..........................................................................................................................................  Reviewed by Signature/Title    ...................................................              ..............................................  Date                                                            Time

## 2017-05-21 ENCOUNTER — HOSPITAL ENCOUNTER (EMERGENCY)
Facility: HOSPITAL | Age: 72
Discharge: HOME | End: 2017-05-21
Attending: FAMILY MEDICINE | Admitting: FAMILY MEDICINE
Payer: MEDICARE

## 2017-05-21 VITALS
TEMPERATURE: 98.2 F | DIASTOLIC BLOOD PRESSURE: 84 MMHG | OXYGEN SATURATION: 96 % | RESPIRATION RATE: 18 BRPM | HEART RATE: 106 BPM | SYSTOLIC BLOOD PRESSURE: 146 MMHG

## 2017-05-21 DIAGNOSIS — R19.7 DIARRHEA, UNSPECIFIED TYPE: ICD-10-CM

## 2017-05-21 LAB
ALBUMIN SERPL-MCNC: 4 G/DL (ref 3.4–5)
ALP SERPL-CCNC: 107 U/L (ref 40–150)
ALT SERPL W P-5'-P-CCNC: 67 U/L (ref 0–50)
ANION GAP SERPL CALCULATED.3IONS-SCNC: 14 MMOL/L (ref 3–14)
AST SERPL W P-5'-P-CCNC: 36 U/L (ref 0–45)
BASOPHILS # BLD AUTO: 0 10E9/L (ref 0–0.2)
BASOPHILS NFR BLD AUTO: 0.2 %
BILIRUB SERPL-MCNC: 0.3 MG/DL (ref 0.2–1.3)
BUN SERPL-MCNC: 31 MG/DL (ref 7–30)
CALCIUM SERPL-MCNC: 9 MG/DL (ref 8.5–10.1)
CHLORIDE SERPL-SCNC: 107 MMOL/L (ref 94–109)
CO2 SERPL-SCNC: 16 MMOL/L (ref 20–32)
CREAT SERPL-MCNC: 1.8 MG/DL (ref 0.52–1.04)
DIFFERENTIAL METHOD BLD: ABNORMAL
EOSINOPHIL # BLD AUTO: 0 10E9/L (ref 0–0.7)
EOSINOPHIL NFR BLD AUTO: 0.4 %
ERYTHROCYTE [DISTWIDTH] IN BLOOD BY AUTOMATED COUNT: 14.6 % (ref 10–15)
GFR SERPL CREATININE-BSD FRML MDRD: 28 ML/MIN/1.7M2
GLUCOSE SERPL-MCNC: 273 MG/DL (ref 70–99)
HCT VFR BLD AUTO: 36.8 % (ref 35–47)
HGB BLD-MCNC: 11.6 G/DL (ref 11.7–15.7)
IMM GRANULOCYTES # BLD: 0.1 10E9/L (ref 0–0.4)
IMM GRANULOCYTES NFR BLD: 0.9 %
LYMPHOCYTES # BLD AUTO: 1.9 10E9/L (ref 0.8–5.3)
LYMPHOCYTES NFR BLD AUTO: 20.5 %
MCH RBC QN AUTO: 23.7 PG (ref 26.5–33)
MCHC RBC AUTO-ENTMCNC: 31.5 G/DL (ref 31.5–36.5)
MCV RBC AUTO: 75 FL (ref 78–100)
MONOCYTES # BLD AUTO: 0.8 10E9/L (ref 0–1.3)
MONOCYTES NFR BLD AUTO: 8.2 %
NEUTROPHILS # BLD AUTO: 6.5 10E9/L (ref 1.6–8.3)
NEUTROPHILS NFR BLD AUTO: 69.8 %
NRBC # BLD AUTO: 0 10*3/UL
NRBC BLD AUTO-RTO: 0 /100
PLATELET # BLD AUTO: 186 10E9/L (ref 150–450)
POTASSIUM SERPL-SCNC: 4.1 MMOL/L (ref 3.4–5.3)
PROT SERPL-MCNC: 8.9 G/DL (ref 6.8–8.8)
RBC # BLD AUTO: 4.9 10E12/L (ref 3.8–5.2)
SODIUM SERPL-SCNC: 137 MMOL/L (ref 133–144)
TROPONIN I SERPL-MCNC: NORMAL UG/L (ref 0–0.04)
WBC # BLD AUTO: 9.3 10E9/L (ref 4–11)

## 2017-05-21 PROCEDURE — 80053 COMPREHEN METABOLIC PANEL: CPT | Performed by: FAMILY MEDICINE

## 2017-05-21 PROCEDURE — 25000132 ZZH RX MED GY IP 250 OP 250 PS 637: Mod: GY | Performed by: FAMILY MEDICINE

## 2017-05-21 PROCEDURE — A9270 NON-COVERED ITEM OR SERVICE: HCPCS | Mod: GY | Performed by: FAMILY MEDICINE

## 2017-05-21 PROCEDURE — 85025 COMPLETE CBC W/AUTO DIFF WBC: CPT | Performed by: FAMILY MEDICINE

## 2017-05-21 PROCEDURE — 96360 HYDRATION IV INFUSION INIT: CPT

## 2017-05-21 PROCEDURE — 36415 COLL VENOUS BLD VENIPUNCTURE: CPT | Performed by: FAMILY MEDICINE

## 2017-05-21 PROCEDURE — 99284 EMERGENCY DEPT VISIT MOD MDM: CPT | Mod: 25

## 2017-05-21 PROCEDURE — 25000128 H RX IP 250 OP 636: Performed by: FAMILY MEDICINE

## 2017-05-21 PROCEDURE — 99284 EMERGENCY DEPT VISIT MOD MDM: CPT | Performed by: FAMILY MEDICINE

## 2017-05-21 PROCEDURE — 84484 ASSAY OF TROPONIN QUANT: CPT | Performed by: FAMILY MEDICINE

## 2017-05-21 RX ORDER — ACETAMINOPHEN 325 MG/1
1000 TABLET ORAL ONCE
Status: COMPLETED | OUTPATIENT
Start: 2017-05-21 | End: 2017-05-21

## 2017-05-21 RX ADMIN — SODIUM CHLORIDE 1000 ML: 9 INJECTION, SOLUTION INTRAVENOUS at 20:42

## 2017-05-21 RX ADMIN — ACETAMINOPHEN 975 MG: 325 TABLET, FILM COATED ORAL at 22:05

## 2017-05-21 ASSESSMENT — ENCOUNTER SYMPTOMS
VOMITING: 0
BACK PAIN: 1
BLOOD IN STOOL: 0
DYSURIA: 0
FEVER: 1
BRUISES/BLEEDS EASILY: 1
NECK PAIN: 1
HEADACHES: 1
ABDOMINAL PAIN: 1
PHOTOPHOBIA: 0
DIARRHEA: 1

## 2017-05-21 NOTE — ED AVS SNAPSHOT
[facility]    [address] [address]    Phone:  [phone]                                       Cee Pereira   MRN: [id number]    Department:  [facility]   Date of Visit:  5/21/2017           After Visit Summary Signature Page     I have received my discharge instructions, and my questions have been answered. I have discussed any challenges I see with this plan with the nurse or doctor.    ..........................................................................................................................................  Patient/Patient Representative Signature      ..........................................................................................................................................  Patient Representative Print Name and Relationship to Patient    ..................................................               ................................................  Date                                            Time    ..........................................................................................................................................  Reviewed by Signature/Title    ...................................................              ..............................................  Date                                                            Time

## 2017-05-21 NOTE — ED AVS SNAPSHOT
[facility]    [address] [address]    Phone:  [phone]                                       Cee Pereira   MRN: [id number]    Department:  [facility]   Date of Visit:  5/21/2017           Patient Information     Date Of Birth          1945        Your diagnoses for this visit were:     Diarrhea, unspecified type        You were seen by Dominique Hunt MD.      Follow-up Information     Follow up with Matthew Villela DO. Schedule an appointment as soon as possible for a visit in 3 days.    Specialty:  Family Practice    Contact information:    [facility]  [address] [facility] [address] [phone]          Follow up with [facility].    Specialty:  EMERGENCY MEDICINE    Why:  If symptoms worsen    Contact information:    [address] [address] [phone]    Additional information:    From [place]: Take [address]. Turn left at [address]. Turn left at the first stoplight on [address]. At the first stop sign, take a right onto [address]. Take a left into the parking lot and continue through until you reach the North enterance of the building.       From [place]: Take [address]. Take the [address] ramp towards [place]. Turn left onto [address]. Take a slight right onto [address]. Turn left at the first stoplight on [address]. At the first stop sign, take a right onto [address]. Take a left into the parking lot and continue through until you reach the North enterance of the building.       From [place]: Take [address]. Take a right at [address]. At the first stop sign, take a right onto [address]. Take a left into the parking lot and continue through until you reach the North enterance of the building.         Discharge Instructions       Thank you for coming to [facility].  If you are concerned or things get worse, don't hesitate to return to the Emergency Room.    You need to be drinking at least 64 ounces of water per day.  Now that you are sick you should have 16. To figure out your water needs take your weight and divide it by 2.  That is how many ounces of water you need to drink daily.  If you weigh 200 lbs -- 1/2 of that is 100 and therefore you need 100 ounces of water daily---that's almost 13 glasses of water daily.  If you drink any caffeinated beverages, drink one extra glass of water for each cup/can that you drink.          Uncertain Causes of Diarrhea (Adult)    Diarrhea is when stools are loose and watery. This can be caused by:    Viral infections    Bacterial infections    Food poisoning    Parasites    Irritable bowel syndrome (IBS)    Inflammatory bowel diseases such as ulcerative colitis, Crohn's disease, and celiac disease    Food intolerance, such as to lactose, the sugar found in milk and milk products    Reaction to medicines like antibiotics, laxatives, cancer drugs, and antacids  Along with diarrhea, you may also have:    Abdominal pain and cramping    Nausea and vomiting    Loss of bowel control    Fever and chills    Bloody stools  In some cases, antibiotics may help to treat diarrhea. You may have a stool sample test. This is done to see what is causing your diarrhea, and if antibiotics will help treat it. The results of a stool sample test may take up to 2 days. The healthcare provider may not give you antibiotics until he or she has the stool test results.  Diarrhea can cause dehydration. This is the loss of too much water and other fluids from the body. When this occurs, body fluid must be replaced. This can be done with oral rehydration solutions. Oral rehydration solutions are available at drugstores and grocery stores without a prescription.  Home care  Follow all instructions given by your healthcare provider. Rest at home for the  next 24 hours, or until you feel better. Avoid caffeine, tobacco, and alcohol. These can make diarrhea, cramping, and pain worse.  If taking medicines:    Don t take over-the-counter diarrhea or nausea medicines unless your healthcare provider tells you to.    You may use acetaminophen or NSAID medicines like ibuprofen or naproxen to reduce pain and fever. Don t use these if you have chronic liver or kidney disease, or ever had a stomach ulcer or gastrointestinal bleeding. Don't use NSAID medicines if you are already taking one for another condition (like arthritis) or are on daily aspirin therapy (such as for heart disease or after a stroke). Talk with your healthcare provider first.    If antibiotics were prescribed, be sure you take them until they are finished. Don t stop taking them even when you feel better. Antibiotics must be taken as a full course.  To prevent the spread of illness:    Remember that washing with soap and water and using alcohol-based  is the best way to prevent the spread of infection.    Clean the toilet after each use.    Wash your hands before eating.    Wash your hands before and after preparing food. Keep in mind that people with diarrhea or vomiting should not prepare food for others.    Wash your hands after using cutting boards, countertops, and knives that have been in contact with raw foods.    Wash and then peel fruits and vegetables.    Keep uncooked meats away from cooked and ready-to-eat foods.    Use a food thermometer when cooking. Cook poultry to at least 165 F (74 C). Cook ground meat (beef, veal, pork, lamb) to at least 160 F (71 C). Cook fresh beef, veal, lamb, and pork to at least 145 F (63 C).    Don t eat raw or undercooked eggs (poached or zhane side up), poultry, meat, or unpasteurized milk and juices.  Food and drinks  The main goal while treating vomiting or diarrhea is to prevent dehydration. This is done by taking small amounts of liquids often.    Keep  in mind that liquids are more important than food right now.    Drink only small amounts of liquids at a time.    Don t force yourself to eat, especially if you are having cramping, vomiting, or diarrhea. Don t eat large amounts at a time, even if you are hungry.    If you eat, avoid fatty, greasy, spicy, or fried foods.    Don t eat dairy foods or drink milk if you have diarrhea. These can make diarrhea worse.  During the first 24 hours you can try:    Oral rehydration solutions. Do not use sports drinks. They have too much sugar and not enough electrolytes.    Soft drinks without caffeine    Ginger ale    Water (plain or flavored)    Decaf tea or coffee    Clear broth, consommé, or bouillon    Gelatin, popsicles, or frozen fruit juice bars  The second 24 hours, if you are feeling better, you can add:    Hot cereal, plain toast, bread, rolls, or crackers    Plain noodles, rice, mashed potatoes, chicken noodle soup, or rice soup    Unsweetened canned fruit (no pineapple)    Bananas  As you recover:    Limit fat intake to less than 15 grams per day. Don t eat margarine, butter, oils, mayonnaise, sauces, gravies, fried foods, peanut butter, meat, poultry, or fish.    Limit fiber. Don t eat raw or cooked vegetables, fresh fruits except bananas, or bran cereals.    Limit caffeine and chocolate.    Limit dairy.    Don t use spices or seasonings except salt.    Go back to your normal diet over time, as you feel better and your symptoms improve.    If the symptoms come back, go back to a simple diet or clear liquids.  Follow-up care  Follow up with your healthcare provider, or as advised. If a stool sample was taken or cultures were done, call the healthcare provider for the results as instructed.  Call 911  Call 911 if you have any of these symptoms:    Trouble breathing    Confusion    Extreme drowsiness or trouble walking    Loss of consciousness    Rapid heart rate    Chest pain    Stiff neck    Seizure  When to seek  medical advice  Call your healthcare provider right away if any of these occur:    Abdominal pain that gets worse    Constant lower right abdominal pain    Continued vomiting and inability to keep liquids down    Diarrhea more than 5 times a day    Blood in vomit or stool    Dark urine or no urine for 8 hours, dry mouth and tongue, tiredness, weakness, or dizziness    Drowsiness    New rash    You don t get better in 2 to 3 days    Fever of 100.4 F (38 C) or higher that doesn t get lower with medicine    [phone] [employer]. [address] [place] [address]. All rights reserved. This information is not intended as a substitute for professional medical care. Always follow your healthcare professional's instructions.                 Review of your medicines      Our records show that you are taking the medicines listed below. If these are incorrect, please call your family doctor or clinic.        Dose / Directions Last dose taken    aspirin-dipyridamole  MG per 12 hr capsule   Commonly known as:  AGGRENOX   Dose:  1 capsule        Take 1 capsule by mouth daily   Refills:  0        CELEBREX PO   Dose:  200 mg        Take 200 mg by mouth 2 times daily   Refills:  0        COQ-10 PO   Dose:  100 mg        Take 100 mg by mouth daily   Refills:  0        DOCUSATE SODIUM PO   Dose:  100 mg        Take 100 mg by mouth daily   Refills:  0        * GABAPENTIN PO   Dose:  300 mg        Take 300 mg by mouth 2 times daily AM, second dose at 1200   Refills:  0        * GABAPENTIN PO   Dose:  600 mg        Take 600 mg by mouth At Bedtime   Refills:  0        hydroxychloroquine 200 MG tablet   Commonly known as:  PLAQUENIL   Dose:  200 mg        Take 200 mg by mouth 2 times daily   Refills:  0        insulin aspart 100 UNIT/ML injection   Commonly known as:  NovoLOG PEN        Inject Subcutaneous 3 times daily (with meals)   Refills:  0        METFORMIN HCL PO   Dose:  1000 mg        Take 1,000 mg by  "mouth 2 times daily (with meals)   Refills:  0        METHOCARBAMOL PO   Dose:  750 mg        Take 750 mg by mouth   Refills:  0        METOPROLOL TARTRATE PO   Dose:  25 mg        Take 25 mg by mouth daily   Refills:  0        PANTOPRAZOLE SODIUM PO   Dose:  40 mg        Take 40 mg by mouth every morning (before breakfast)   Refills:  0        SIMVASTATIN PO   Dose:  10 mg        Take 10 mg by mouth daily   Refills:  0        TRAMADOL HCL PO   Dose:  50 mg        Take 50 mg by mouth every 6 hours as needed for moderate to severe pain   Refills:  0        * Notice:  This list has 2 medication(s) that are the same as other medications prescribed for you. Read the directions carefully, and ask your doctor or other care provider to review them with you.            Procedures and tests performed during your visit     CBC with platelets differential    Comprehensive metabolic panel    Peripheral IV catheter    Troponin I      Orders Needing Specimen Collection     None      Pending Results     Date and Time Order Name Status Description    5/20/2017 0211 Stool culture SSCE Preliminary             Pending Culture Results     Date and Time Order Name Status Description    5/20/2017 0211 Stool culture SSCE Preliminary             Thank you for choosing [facility]       Thank you for choosing [facility] for your care. Our goal is always to provide you with excellent care. Hearing back from our patients is one way we can continue to improve our services. Please take a few minutes to complete the written survey that you may receive in the mail after you visit with us. Thank you!        [employer] Information     [employer] lets you send messages to your doctor, view your test results, renew your prescriptions, schedule appointments and more. To sign up, go to [email or web address] . Click on \"Log in\" on the left side of the screen, which will take you to the Welcome page. Then click on \"Sign up Now\" on the right side of the page. "     You will be asked to enter the access code listed below, as well as some personal information. Please follow the directions to create your username and password.     Your access code is: [email or web address]  Expires: 2017  5:52 AM     Your access code will  in 90 days. If you need help or a new code, please call your [facility] clinic or [phone].        Care EveryWhere ID     This is your Care EveryWhere ID. This could be used by other organizations to access your [facility] medical records  [id number]        After Visit Summary       This is your record. Keep this with you and show to your community pharmacist(s) and doctor(s) at your next visit.

## 2017-05-22 NOTE — ED NOTES
Patient discharged home at this time. Patient and  given written and verbal discharge instructions regarding home care, f/u and medications. Patient verbalized understanding of all discharge instructions. Given instructions regarding fluid intake.

## 2017-05-22 NOTE — DISCHARGE INSTRUCTIONS
[facility]  If you are concerned or things get worse, don't hesitate to return to the Emergency Room.    You need to be drinking at least 64 ounces of water per day.  Now that you are sick you should have 16. To figure out your water needs take your weight and divide it by 2.  That is how many ounces of water you need to drink daily.  If you weigh 200 lbs -- 1/2 of that is 100 and therefore you need 100 ounces of water daily---that's almost 13 glasses of water daily.  If you drink any caffeinated beverages, drink one extra glass of water for each cup/can that you drink.          Uncertain Causes of Diarrhea (Adult)    Diarrhea is when stools are loose and watery. This can be caused by:    Viral infections    Bacterial infections    Food poisoning    Parasites    Irritable bowel syndrome (IBS)    Inflammatory bowel diseases such as ulcerative colitis, Crohn's disease, and celiac disease    Food intolerance, such as to lactose, the sugar found in milk and milk products    Reaction to medicines like antibiotics, laxatives, cancer drugs, and antacids  Along with diarrhea, you may also have:    Abdominal pain and cramping    Nausea and vomiting    Loss of bowel control    Fever and chills    Bloody stools  In some cases, antibiotics may help to treat diarrhea. You may have a stool sample test. This is done to see what is causing your diarrhea, and if antibiotics will help treat it. The results of a stool sample test may take up to 2 days. The healthcare provider may not give you antibiotics until he or she has the stool test results.  Diarrhea can cause dehydration. This is the loss of too much water and other fluids from the body. When this occurs, body fluid must be replaced. This can be done with oral rehydration solutions. Oral rehydration solutions are available at drugstores and grocery stores without a prescription.  Home care  Follow all instructions given by your  healthcare provider. Rest at home for the next 24 hours, or until you feel better. Avoid caffeine, tobacco, and alcohol. These can make diarrhea, cramping, and pain worse.  If taking medicines:    Don t take over-the-counter diarrhea or nausea medicines unless your healthcare provider tells you to.    You may use acetaminophen or NSAID medicines like ibuprofen or naproxen to reduce pain and fever. Don t use these if you have chronic liver or kidney disease, or ever had a stomach ulcer or gastrointestinal bleeding. Don't use NSAID medicines if you are already taking one for another condition (like arthritis) or are on daily aspirin therapy (such as for heart disease or after a stroke). Talk with your healthcare provider first.    If antibiotics were prescribed, be sure you take them until they are finished. Don t stop taking them even when you feel better. Antibiotics must be taken as a full course.  To prevent the spread of illness:    Remember that washing with soap and water and using alcohol-based  is the best way to prevent the spread of infection.    Clean the toilet after each use.    Wash your hands before eating.    Wash your hands before and after preparing food. Keep in mind that people with diarrhea or vomiting should not prepare food for others.    Wash your hands after using cutting boards, countertops, and knives that have been in contact with raw foods.    Wash and then peel fruits and vegetables.    Keep uncooked meats away from cooked and ready-to-eat foods.    Use a food thermometer when cooking. Cook poultry to at least 165 F (74 C). Cook ground meat (beef, veal, pork, lamb) to at least 160 F (71 C). Cook fresh beef, veal, lamb, and pork to at least 145 F (63 C).    Don t eat raw or undercooked eggs (poached or zhane side up), poultry, meat, or unpasteurized milk and juices.  Food and drinks  The main goal while treating vomiting or diarrhea is to prevent dehydration. This is done by  taking small amounts of liquids often.    Keep in mind that liquids are more important than food right now.    Drink only small amounts of liquids at a time.    Don t force yourself to eat, especially if you are having cramping, vomiting, or diarrhea. Don t eat large amounts at a time, even if you are hungry.    If you eat, avoid fatty, greasy, spicy, or fried foods.    Don t eat dairy foods or drink milk if you have diarrhea. These can make diarrhea worse.  During the first 24 hours you can try:    Oral rehydration solutions. Do not use sports drinks. They have too much sugar and not enough electrolytes.    Soft drinks without caffeine    Ginger ale    Water (plain or flavored)    Decaf tea or coffee    Clear broth, consommé, or bouillon    Gelatin, popsicles, or frozen fruit juice bars  The second 24 hours, if you are feeling better, you can add:    Hot cereal, plain toast, bread, rolls, or crackers    Plain noodles, rice, mashed potatoes, chicken noodle soup, or rice soup    Unsweetened canned fruit (no pineapple)    Bananas  As you recover:    Limit fat intake to less than 15 grams per day. Don t eat margarine, butter, oils, mayonnaise, sauces, gravies, fried foods, peanut butter, meat, poultry, or fish.    Limit fiber. Don t eat raw or cooked vegetables, fresh fruits except bananas, or bran cereals.    Limit caffeine and chocolate.    Limit dairy.    Don t use spices or seasonings except salt.    Go back to your normal diet over time, as you feel better and your symptoms improve.    If the symptoms come back, go back to a simple diet or clear liquids.  Follow-up care  Follow up with your healthcare provider, or as advised. If a stool sample was taken or cultures were done, call the healthcare provider for the results as instructed.  Call 911  Call 911 if you have any of these symptoms:    Trouble breathing    Confusion    Extreme drowsiness or trouble walking    Loss of consciousness    Rapid heart  rate    Chest pain    Stiff neck    Seizure  When to seek medical advice  Call your healthcare provider right away if any of these occur:    Abdominal pain that gets worse    Constant lower right abdominal pain    Continued vomiting and inability to keep liquids down    Diarrhea more than 5 times a day    Blood in vomit or stool    Dark urine or no urine for 8 hours, dry mouth and tongue, tiredness, weakness, or dizziness    Drowsiness    New rash    You don t get better in 2 to 3 days    Fever of 100.4 F (38 C) or higher that doesn t get lower with medicine    9814-4837 [employer] [address] [place] [id number]. All rights reserved. This information is not intended as a substitute for professional medical care. Always follow your healthcare professional's instructions.

## 2017-05-22 NOTE — ED PROVIDER NOTES
"  History     Chief Complaint   Patient presents with     Diarrhea     since last Thursday     Generalized Weakness     HPI  Cee Pereira is a 71 year old female who is s/p colectomy (for diverticulitis), history of bowel obstructions, diverticulitis, lumbar herniation, who has had diarrhea and abdominal cramping since Thursday--3 days.     Colonoscopy: last one was 2 years ago. She has very little colon. Normal 2 years ago    She was seen in the ER last night and given fluids and zofran. Yesterday she had chest pain and trouble breathing that had been ongoing for 5 hours. She had a neg CT last night and normal labs.     Since last night she has gone 20-30 x. She says she is shaking and has a hard time walking. No falling. She is walking to and from the bathroom. \"We live in a senior home. It's a very small house.\"  She typically drives and does ADLs.  doesn't help her walk. Cramping has let up. No blood in her stools. Using desitin.     Pt states her appetite has been down the last few days. IDDM glucoses running in 120's usually, but tonight and last night was 200's    She didn't take her insulin because she didn't know if she should take it.      The history is provided by the patient and the spouse. No  was used.     I have reviewed the Medications, Allergies, Past Medical and Surgical History, and Social History in the Epic system.    Review of Systems   Constitutional: Positive for fever (slight fever today).   Eyes: Positive for visual disturbance. Negative for photophobia.   Cardiovascular: Positive for chest pain (has heaviness in her chest--ongoing for a few weeks).   Gastrointestinal: Positive for abdominal pain and diarrhea. Negative for blood in stool and vomiting. Nausea: not today but was the last two days.   Genitourinary: Negative for dysuria.        She has urine when she has a bowel movement.    Musculoskeletal: Positive for back pain and neck pain.   Neurological: " "Positive for headaches (atypical for her to get a headache. She is speaking normal sentences.  headache is 8/10 now.  It was >10/10 . \"It was off the chart.\" ).   Hematological: Bruises/bleeds easily.       Physical Exam   BP: 161/89  Pulse: 106  Temp: 98.2  F (36.8  C)  Resp: 18  SpO2: 95 %  Physical Exam   Constitutional: She is oriented to person, place, and time. She appears well-developed. No distress.   HENT:   Head: Normocephalic and atraumatic.   Eyes: Pupils are equal, round, and reactive to light.   Cardiovascular: Normal rate, regular rhythm and normal heart sounds.  Exam reveals no gallop and no friction rub.    No murmur heard.  Pulmonary/Chest: Effort normal and breath sounds normal. No respiratory distress.   Abdominal: There is no tenderness. There is no rebound and no guarding.   Musculoskeletal: She exhibits no edema.   Neurological: She is alert and oriented to person, place, and time.   Skin: Skin is warm and dry.   Psychiatric: She has a normal mood and affect.   Nursing note and vitals reviewed.      ED Course     ED Course     Procedures        Past Medical History:   Diagnosis Date     Diabetes mellitus (H)      Diverticulitis      Herniated lumbar intervertebral disc        Past Surgical History:   Procedure Laterality Date     APPENDECTOMY OPEN       CHOLECYSTECTOMY       COLECTOMY WITHOUT COLOSTOMY      for diverticulitis     DECOMPRESSION LUMBAR ONE LEVEL  7/31/2013    L4-L5     JOINT REPLACEMENT      bilateral hip replacements       No family history on file.    Social History   Substance Use Topics     Smoking status: Former Smoker     Smokeless tobacco: Not on file     Alcohol use Not on file        Patient Vitals for the past 24 hrs:   BP Temp Temp src Pulse Resp SpO2   05/21/17 2014 161/89 98.2  F (36.8  C) Tympanic 106 18 95 %       LABORATORY (REVIEWED AND INTERPRETED):  CBC BMP Liver Panel   Recent Labs   Lab Test  05/20/17   0247   WBC  9.1   HGB  11.2*   PLT  146*    Recent Labs " "  Lab Test  05/20/17   0247   POTASSIUM  4.5   CHLORIDE  105   BUN  22    Recent Labs   Lab Test  05/20/17   0247   08/17/16 1920   BILITOTAL  0.4   < >  0.3   ALT  61*   < >  35   AST  65*   < >  19   LIPASE   --    --   255    < > = values in this interval not displayed.        UA     DIP MICRO   Recent Labs   Lab Test  05/20/17   0515   COLOR  Yellow   NITRITE  Negative    Invalid input(s): RBCUA, WBCUA, BACTERIAUA, EPITHELIALUA       OTHER LABS   Recent Labs   Lab Test  08/17/16   1920   INR  1.12   LIPASE  255   AMYLASE  106            INTERVENTIONS:  Medications   0.9% sodium chloride BOLUS (not administered)       ECG (Reviewed and Interpreted by me):  None    IMAGING (Reviewed and Interpreted by me):  None    ED COURSE:  8:23 PM labs ordered    The patient has been seen and evaluated by me.  I have reviewed the medical records.     10:29 PM discussed labs. She has gone 1 x while she's been here. She had a liter of fluids.     IMPRESSIONS AND PLAN:  Diarrhea: ongoing for 4 days. Seen in the ER yesterday. CBC, CMP, Troponin are uncahnges from yesterday. Stool culture, O&P, c diff yesterday were negative. She feels weak but feels safe at home. Last abx were in Feb.  No travel, no unpurified water.  and patient feel she can go home. Increase fluids at home >16 glasses per day. 4 need to be gatorade. Neg CT yesterday.     Headache: neuro is non focal. Percocet given for her headache. Her last headache was 12/16. This headache was \"worser\" than other headaches. Headache was insidious.      DIAGNOSIS:    ICD-10-CM    1. Diarrhea, unspecified type R19.7        DISCHARGE MEDICATIONS:  New Prescriptions    No medications on file         LOS:  4      5/21/2017  [facility]    Matthew Villela Cassandra E, MD  05/21/17 2248       Dominique Hunt MD  05/21/17 2249    "

## 2017-05-23 ENCOUNTER — TRANSFERRED RECORDS (OUTPATIENT)
Dept: HEALTH INFORMATION MANAGEMENT | Facility: HOSPITAL | Age: 72
End: 2017-05-23

## 2017-05-23 ENCOUNTER — HOSPITAL ENCOUNTER (OUTPATIENT)
Facility: HOSPITAL | Age: 72
Setting detail: OBSERVATION
Discharge: HOME | End: 2017-05-24
Attending: FAMILY MEDICINE | Admitting: FAMILY MEDICINE
Payer: MEDICARE

## 2017-05-23 PROBLEM — A08.4 VIRAL GASTROENTERITIS: Status: ACTIVE | Noted: 2017-05-23

## 2017-05-23 PROBLEM — E86.0 DEHYDRATION: Status: ACTIVE | Noted: 2017-05-23

## 2017-05-23 PROBLEM — K50.10 CROHN'S DISEASE OF LARGE INTESTINE WITHOUT COMPLICATION (H): Status: ACTIVE | Noted: 2017-05-23

## 2017-05-23 PROBLEM — K52.9 GASTROENTERITIS: Status: ACTIVE | Noted: 2017-05-23

## 2017-05-23 LAB
BACTERIA SPEC CULT: NORMAL
C DIFF TOX B STL QL: NORMAL
E COLI SXT1+2 STL IA: NORMAL
GLUCOSE BLDC GLUCOMTR-MCNC: 137 MG/DL (ref 70–99)
GLUCOSE BLDC GLUCOMTR-MCNC: 148 MG/DL (ref 70–99)
MICRO REPORT STATUS: NORMAL
SPECIMEN SOURCE: NORMAL
SPECIMEN SOURCE: NORMAL

## 2017-05-23 PROCEDURE — 25000131 ZZH RX MED GY IP 250 OP 636 PS 637: Mod: GY | Performed by: FAMILY MEDICINE

## 2017-05-23 PROCEDURE — 87493 C DIFF AMPLIFIED PROBE: CPT | Performed by: FAMILY MEDICINE

## 2017-05-23 PROCEDURE — 87046 STOOL CULTR AEROBIC BACT EA: CPT | Performed by: FAMILY MEDICINE

## 2017-05-23 PROCEDURE — A9270 NON-COVERED ITEM OR SERVICE: HCPCS | Mod: GY | Performed by: FAMILY MEDICINE

## 2017-05-23 PROCEDURE — 25000132 ZZH RX MED GY IP 250 OP 250 PS 637: Mod: GY | Performed by: FAMILY MEDICINE

## 2017-05-23 PROCEDURE — 25000125 ZZHC RX 250: Performed by: FAMILY MEDICINE

## 2017-05-23 PROCEDURE — 87045 FECES CULTURE AEROBIC BACT: CPT | Performed by: FAMILY MEDICINE

## 2017-05-23 PROCEDURE — 87015 SPECIMEN INFECT AGNT CONCNTJ: CPT | Performed by: FAMILY MEDICINE

## 2017-05-23 PROCEDURE — 00000146 ZZHCL STATISTIC GLUCOSE BY METER IP

## 2017-05-23 PROCEDURE — G0378 HOSPITAL OBSERVATION PER HR: HCPCS

## 2017-05-23 PROCEDURE — 87899 AGENT NOS ASSAY W/OPTIC: CPT | Performed by: FAMILY MEDICINE

## 2017-05-23 RX ORDER — DIPHENOXYLATE HCL/ATROPINE 2.5-.025MG
1 TABLET ORAL EVERY 6 HOURS PRN
Status: DISCONTINUED | OUTPATIENT
Start: 2017-05-23 | End: 2017-05-24 | Stop reason: HOSPADM

## 2017-05-23 RX ORDER — SIMVASTATIN 10 MG
10 TABLET ORAL DAILY
Status: DISCONTINUED | OUTPATIENT
Start: 2017-05-24 | End: 2017-05-24 | Stop reason: HOSPADM

## 2017-05-23 RX ORDER — TRAMADOL HYDROCHLORIDE 50 MG/1
50 TABLET ORAL EVERY 6 HOURS PRN
Status: DISCONTINUED | OUTPATIENT
Start: 2017-05-23 | End: 2017-05-24 | Stop reason: HOSPADM

## 2017-05-23 RX ORDER — SODIUM CHLORIDE AND POTASSIUM CHLORIDE 150; 900 MG/100ML; MG/100ML
INJECTION, SOLUTION INTRAVENOUS CONTINUOUS
Status: DISCONTINUED | OUTPATIENT
Start: 2017-05-23 | End: 2017-05-24 | Stop reason: HOSPADM

## 2017-05-23 RX ORDER — ASPIRIN AND DIPYRIDAMOLE 25; 200 MG/1; MG/1
1 CAPSULE, EXTENDED RELEASE ORAL DAILY
Status: DISCONTINUED | OUTPATIENT
Start: 2017-05-24 | End: 2017-05-24 | Stop reason: HOSPADM

## 2017-05-23 RX ORDER — NALOXONE HYDROCHLORIDE 0.4 MG/ML
.1-.4 INJECTION, SOLUTION INTRAMUSCULAR; INTRAVENOUS; SUBCUTANEOUS
Status: DISCONTINUED | OUTPATIENT
Start: 2017-05-23 | End: 2017-05-24 | Stop reason: HOSPADM

## 2017-05-23 RX ORDER — HYDROXYCHLOROQUINE SULFATE 200 MG/1
200 TABLET, FILM COATED ORAL AT BEDTIME
Status: DISCONTINUED | OUTPATIENT
Start: 2017-05-23 | End: 2017-05-24 | Stop reason: HOSPADM

## 2017-05-23 RX ORDER — PANTOPRAZOLE SODIUM 40 MG/1
40 TABLET, DELAYED RELEASE ORAL
Status: DISCONTINUED | OUTPATIENT
Start: 2017-05-24 | End: 2017-05-24 | Stop reason: HOSPADM

## 2017-05-23 RX ORDER — GABAPENTIN 300 MG/1
300 CAPSULE ORAL 2 TIMES DAILY
Status: DISCONTINUED | OUTPATIENT
Start: 2017-05-24 | End: 2017-05-24 | Stop reason: HOSPADM

## 2017-05-23 RX ORDER — METOPROLOL TARTRATE 25 MG/1
25 TABLET, FILM COATED ORAL DAILY
Status: DISCONTINUED | OUTPATIENT
Start: 2017-05-24 | End: 2017-05-24 | Stop reason: HOSPADM

## 2017-05-23 RX ORDER — NICOTINE POLACRILEX 4 MG
15-30 LOZENGE BUCCAL
Status: DISCONTINUED | OUTPATIENT
Start: 2017-05-23 | End: 2017-05-24 | Stop reason: HOSPADM

## 2017-05-23 RX ORDER — GABAPENTIN 600 MG/1
600 TABLET ORAL AT BEDTIME
Status: DISCONTINUED | OUTPATIENT
Start: 2017-05-23 | End: 2017-05-24 | Stop reason: HOSPADM

## 2017-05-23 RX ORDER — DEXTROSE MONOHYDRATE 25 G/50ML
25-50 INJECTION, SOLUTION INTRAVENOUS
Status: DISCONTINUED | OUTPATIENT
Start: 2017-05-23 | End: 2017-05-24 | Stop reason: HOSPADM

## 2017-05-23 RX ADMIN — DIPHENOXYLATE HYDROCHLORIDE AND ATROPINE SULFATE 1 TABLET: 2.5; .025 TABLET ORAL at 17:12

## 2017-05-23 RX ADMIN — INSULIN ASPART 1 UNITS: 100 INJECTION, SOLUTION INTRAVENOUS; SUBCUTANEOUS at 18:19

## 2017-05-23 RX ADMIN — HYDROXYCHLOROQUINE SULFATE 200 MG: 200 TABLET, FILM COATED ENTERAL at 20:52

## 2017-05-23 RX ADMIN — POTASSIUM CHLORIDE AND SODIUM CHLORIDE: 900; 150 INJECTION, SOLUTION INTRAVENOUS at 19:45

## 2017-05-23 RX ADMIN — GABAPENTIN 600 MG: 600 TABLET, FILM COATED ORAL at 20:51

## 2017-05-23 RX ADMIN — POTASSIUM CHLORIDE AND SODIUM CHLORIDE: 900; 150 INJECTION, SOLUTION INTRAVENOUS at 12:45

## 2017-05-23 RX ADMIN — TRAMADOL HYDROCHLORIDE 50 MG: 50 TABLET, COATED ORAL at 17:35

## 2017-05-23 NOTE — PLAN OF CARE
Problem: Goal Outcome Summary  Goal: Goal Outcome Summary  Outcome: No Change  [facility] Admission Note:     Patient admitted to 3208/3208-1 at approximately 1000 via wheel chair accompanied by spouse from clinic . Report received from none (called Dr. Whitaker nurse for report but not called back).  Patient ambulated to bed via self.. Patient is alert and oriented X 3, denies pain; rates at 0 on 0-10 scale.  Patient oriented to room, unit, hourly rounding, and plan of care. Explained admission packet and patient handbook with patient bill of rights brochure. Will continue to monitor and document as needed.      Epic downtime when patient admitted.  Called Dr. Villela's office for report but no call back.  Around 1240 received a call back from Dr. Villela and received a few orders.  Requested a C-diff r/o, enteric precautions, IV fluids, and a diet order.  Dr. Villela ordered all the above via telephone-paper orders d/t downtime.  C-diff came back negative.  22Gauge IV placed and IV fluids running as ordered.  Was unable to get PTA med list done with patient since she didn't even know what type of insulin she is on at home; called [facility] pharmacy for current med list.    Face to face report given with opportunity to observe patient.     Report given to AYAKA Valdivia   5/23/2017  4:46 PM

## 2017-05-23 NOTE — IP AVS SNAPSHOT
[facility]    [address] [address]    Phone:  [phone]    Fax:  [phone]                                       After Visit Summary   5/23/2017    Cee Pereira    MRN: [id number]           After Visit Summary Signature Page     I have received my discharge instructions, and my questions have been answered. I have discussed any challenges I see with this plan with the nurse or doctor.    ..........................................................................................................................................  Patient/Patient Representative Signature      ..........................................................................................................................................  Patient Representative Print Name and Relationship to Patient    ..................................................               ................................................  Date                                            Time    ..........................................................................................................................................  Reviewed by Signature/Title    ...................................................              ..............................................  Date                                                            Time

## 2017-05-23 NOTE — PLAN OF CARE
Problem: Goal Outcome Summary  Goal: Goal Outcome Summary  Outcome: No Change  meds / trmts behind due to epic down on previous shift-will admin meds as able, C/O HA / neck ache at shift start-medicated with tramadol along with an ice pack per pt request, is A/O & resting in bed, rings for her needs, is diab  & bs=179-covered per SS. One lomotil given at shift start due to a loose stool-dc'd enteric isolation as c-diff test came back negative on previous shift. Unable to complete med list  (sent from [facility]'s) - need to obtain in am when open from [facility] to confirm as pt is poor historian, states she is feeling mildly better after IVF's & tramadol but needed HS meds given early as she takes at home so plaquinel & neurontin given now, HS [id number]-al coverage needed. Spouse here at suppertime then left for the night.

## 2017-05-23 NOTE — H&P
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    Matthew Villela    Primary Care Physician   Matthew Villela    Chief Complaint   Abdominal pain and weakness    History is obtained from the patient    History of Present Illness   Cee Pereira is a 71 year old female who presents with acute onset 4 days ago of watery diarrhea.  Pt states up to 7 to 10 stools a day.  Pt denies vomiting or fever.  Pt does have cramping in stomach which improves with stooling. Pt has been trying to keep up oral hydration but doesn't seem to be able. Pt denies blood in stool    Past Medical History    I have reviewed this patient's medical history and updated it with pertinent information if needed.   Past Medical History:   Diagnosis Date     Diabetes mellitus (H)      Diverticulitis      Herniated lumbar intervertebral disc        Past Surgical History   I have reviewed  this patient's surgical history and updated it with pertinent information if needed.  Past Surgical History:   Procedure Laterality Date     APPENDECTOMY OPEN       CHOLECYSTECTOMY       COLECTOMY WITHOUT COLOSTOMY      for diverticulitis     DECOMPRESSION LUMBAR ONE LEVEL  7/31/2013    L4-L5     JOINT REPLACEMENT      bilateral hip replacements       Prior to Admission Medications   Prior to Admission Medications   Prescriptions Last Dose Informant Patient Reported? Taking?   Celecoxib (CELEBREX PO)   Yes No   Sig: Take 200 mg by mouth 2 times daily    Coenzyme Q10 (COQ-10 PO)   Yes No   Sig: Take 100 mg by mouth daily   DOCUSATE SODIUM PO   Yes No   Sig: Take 100 mg by mouth daily   GABAPENTIN PO   Yes No   Sig: Take 300 mg by mouth 2 times daily AM, second dose at 1200   GABAPENTIN PO   Yes No   Sig: Take 600 mg by mouth At Bedtime   METFORMIN HCL PO   Yes No   Sig: Take 1,000 mg by mouth 2 times daily (with meals)    METHOCARBAMOL PO   Yes No   Sig: Take 750 mg by mouth   METOPROLOL TARTRATE PO   Yes No   Sig: Take 25 mg by mouth daily    PANTOPRAZOLE SODIUM PO   Yes No   Sig: Take 40 mg by mouth every morning (before breakfast)   SIMVASTATIN PO   Yes No   Sig: Take 10 mg by mouth daily   TRAMADOL HCL PO   Yes No   Sig: Take 50 mg by mouth every 6 hours as needed for moderate to severe pain   aspirin-dipyridamole (AGGRENOX)  MG per 12 hr capsule   Yes No   Sig: Take 1 capsule by mouth daily   hydroxychloroquine (PLAQUENIL) 200 MG tablet   Yes No   Sig: Take 200 mg by mouth 2 times daily   insulin aspart (INSULIN ASPART) 100 UNIT/ML injection   Yes No   Sig: Inject Subcutaneous 3 times daily (with meals)      Facility-Administered Medications: None     Allergies   Allergies   Allergen Reactions     Ivp Dye [Contrast Dye]      Lortab [Hydrocodone-Acetaminophen]      headaches     Pioglitazone Itching       Social History   I have reviewed this patient's social history and updated it with pertinent  information if needed. Cee Pereira  reports that she has quit smoking. She does not have any smokeless tobacco history on file.    Family History   I have reviewed this patient's family history and updated it with pertinent information if needed.   History reviewed. No pertinent family history.    Review of Systems   C: NEGATIVE for fever, chills, change in weight  E/M: NEGATIVE for ear, mouth and throat problems  R: NEGATIVE for significant cough or SOB  CV: NEGATIVE for chest pain, palpitations or peripheral edema    Physical Exam   Temp: 99.2  F (37.3  C) Temp src: Tympanic BP: 144/79   Heart Rate: 89 Resp: 18 SpO2: 98 % O2 Device: None (Room air)    Vital Signs with Ranges  Temp:  [99.2  F (37.3  C)] 99.2  F (37.3  C)  Heart Rate:  [89] 89  Resp:  [18] 18  BP: (144)/(79) 144/79  SpO2:  [98 %] 98 %  0 lbs 0 oz    Constitutional: ill appearing  Eyes: non icteric  HEENT: intact and moist  Respiratory: CTAB  Cardiovascular: RRR with out M  GI: generalized tenderness on masses or guarding  Lymph/Hematologic: normal  Genitourinary: normal  Skin: normal  Musculoskeletal: normal  Neurologic: normal  Psychiatric: normal    Data   Data reviewed today:  I personally reviewed no images or EKG's today.    Recent Labs  Lab 05/21/17  2040 05/20/17  0247   WBC 9.3 9.1   HGB 11.6* 11.2*   MCV 75* 76*    146*    137   POTASSIUM 4.1 4.5   CHLORIDE 107 105   CO2 16* 22   BUN 31* 22   CR 1.80* 1.74*   ANIONGAP 14 10   ELVIS 9.0 9.5   * 287*   ALBUMIN 4.0 3.9   PROTTOTAL 8.9* 8.6   BILITOTAL 0.3 0.4   ALKPHOS 107 100   ALT 67* 61*   AST 36 65*   TROPI <0.015The 99th percentile for upper reference range is 0.045 ug/L.  Troponin values in the range of 0.045 - 0.120 ug/L may be associated with risks of adverse clinical events. <0.015The 99th percentile for upper reference range is 0.045 ug/L.  Troponin values in the range of 0.045 - 0.120 ug/L may be associated with risks of adverse clinical events.       No results  found for this or any previous visit (from the past 24 hour(s)).

## 2017-05-23 NOTE — IP AVS SNAPSHOT
MRN:[id number]                      After Visit Summary   5/23/2017    Cee Pereira    MRN: [id number]           Thank you!     Thank you for choosing [facility] for your care. Our goal is always to provide you with excellent care. Hearing back from our patients is one way we can continue to improve our services. Please take a few minutes to complete the written survey that you may receive in the mail after you visit with us. Thank you!        Patient Information     Date Of Birth          1945        About your hospital stay     You were admitted on:  May 23, 2017 You last received care in the:  [facility]    You were discharged on:  May 24, 2017       Who to Call     For medical emergencies, please call 911.  For non-urgent questions about your medical care, please call your primary care provider or clinic, [phone]          Attending Provider     Provider Specialty    Matthew Villela DO [facility]       Primary Care Provider Office Phone # Fax #    Matthew Villela [phone]       [facility] [address] [facility] [address]        After Care Instructions     Activity       Your activity upon discharge: activity as tolerated            Diet       Follow this diet upon discharge: Moderate consistent carbohydrate (3474-2422 connie / 4-6 CHO units per meal)                  Follow-up Appointments     Follow-up and recommended labs and tests        Follow up with primary care provider, Matthew Villela, within 2 weeks, for hospital follow- up. No follow up labs or test are needed.                  Pending Results     Date and Time Order Name Status Description    5/23/2017 1345 Stool culture SSCE Preliminary             Statement of Approval     Ordered          05/24/17 1322  I have reviewed and agree with all the recommendations and orders detailed in this document.  EFFECTIVE NOW     Approved and electronically signed by:   "Matthew Villela DO             Admission Information     Date & Time Provider Department Dept. Phone    2017 Matthew Villela, DO [facility] [phone]      Your Vitals Were     Blood Pressure Pulse Temperature Respirations Height Weight    114/53 (BP Location: Left arm) 80 98.4  F (36.9  C) (Tympanic) 12 1.676 m (5' 6\") 72.3 kg (159 lb 6.3 oz)    Pulse Oximetry BMI (Body Mass Index)                97% 25.73 kg/m2          [facility] Information     [facility] lets you send messages to your doctor, view your test results, renew your prescriptions, schedule appointments and more. To sign up, go to [email or web address] . Click on \"Log in\" on the left side of the screen, which will take you to the Welcome page. Then click on \"Sign up Now\" on the right side of the page.     You will be asked to enter the access code listed below, as well as some personal information. Please follow the directions to create your username and password.     Your access code is: [email or web address]  Expires: 2017  5:52 AM     Your access code will  in 90 days. If you need help or a new code, please call your [facility] clinic or [phone].        Care EveryWhere ID     This is your Care EveryWhere ID. This could be used by other organizations to access your [facility] medical records  [id number]           Review of your medicines      CONTINUE these medicines which have NOT CHANGED        Dose / Directions    aspirin-dipyridamole  MG per 12 hr capsule   Commonly known as:  AGGRENOX        Dose:  1 capsule   Take 1 capsule by mouth daily   Refills:  0       CELEBREX PO        Dose:  200 mg   Take 200 mg by mouth 2 times daily   Refills:  0       COQ-10 PO        Dose:  100 mg   Take 100 mg by mouth daily   Refills:  0       * GABAPENTIN PO        Dose:  300 mg   Take 300 mg by mouth 2 times daily AM, second dose at 1200   Refills:  0       * GABAPENTIN PO        Dose:  600 mg   Take 600 mg by mouth At Bedtime "   Refills:  0       hydroxychloroquine 200 MG tablet   Commonly known as:  PLAQUENIL        Dose:  200 mg   Take 200 mg by mouth 2 times daily   Refills:  0       insulin aspart 100 UNIT/ML injection   Commonly known as:  NovoLOG PEN        Inject Subcutaneous 3 times daily (with meals)   Refills:  0       METHOCARBAMOL PO        Dose:  750 mg   Take 750 mg by mouth every 6 hours as needed   Refills:  0       METOPROLOL TARTRATE PO        Dose:  25 mg   Take 25 mg by mouth daily   Refills:  0       PANTOPRAZOLE SODIUM PO        Dose:  40 mg   Take 40 mg by mouth every morning (before breakfast)   Refills:  0       SIMVASTATIN PO        Dose:  10 mg   Take 10 mg by mouth daily   Refills:  0       TRAMADOL HCL PO        Dose:  50 mg   Take 50 mg by mouth every 6 hours as needed for moderate to severe pain   Refills:  0       TRESIBA FLEXTOUCH 100 UNIT/ML pen   Generic drug:  insulin degludec        Dose:  5 Units   Inject 5 Units Subcutaneous At Bedtime   Refills:  0       * Notice:  This list has 2 medication(s) that are the same as other medications prescribed for you. Read the directions carefully, and ask your doctor or other care provider to review them with you.      STOP taking     DOCUSATE SODIUM PO                    Protect others around you: Learn how to safely use, store and throw away your medicines at www.disposemymeds.org.             Medication List: This is a list of all your medications and when to take them. Check marks below indicate your daily home schedule. Keep this list as a reference.      Medications           Morning Afternoon Evening Bedtime As Needed    aspirin-dipyridamole  MG per 12 hr capsule   Commonly known as:  AGGRENOX   Take 1 capsule by mouth daily   Last time this was given:  1 capsule on 5/24/2017  8:42 AM                                CELEBREX PO   Take 200 mg by mouth 2 times daily                                COQ-10 PO   Take 100 mg by mouth daily                                 * GABAPENTIN PO   Take 300 mg by mouth 2 times daily AM, second dose at 1200   Last time this was given:  300 mg on 5/24/2017 11:27 AM                                * GABAPENTIN PO   Take 600 mg by mouth At Bedtime   Last time this was given:  300 mg on 5/24/2017 11:27 AM                                hydroxychloroquine 200 MG tablet   Commonly known as:  PLAQUENIL   Take 200 mg by mouth 2 times daily   Last time this was given:  200 mg on 5/23/2017  8:52 PM                                insulin aspart 100 UNIT/ML injection   Commonly known as:  NovoLOG PEN   Inject Subcutaneous 3 times daily (with meals)   Last time this was given:  1 Units on 5/23/2017  6:19 PM                                METHOCARBAMOL PO   Take 750 mg by mouth every 6 hours as needed                                METOPROLOL TARTRATE PO   Take 25 mg by mouth daily   Last time this was given:  25 mg on 5/24/2017  8:41 AM                                PANTOPRAZOLE SODIUM PO   Take 40 mg by mouth every morning (before breakfast)   Last time this was given:  40 mg on 5/24/2017  6:49 AM                                SIMVASTATIN PO   Take 10 mg by mouth daily   Last time this was given:  10 mg on 5/24/2017  8:42 AM                                TRAMADOL HCL PO   Take 50 mg by mouth every 6 hours as needed for moderate to severe pain   Last time this was given:  50 mg on 5/24/2017  1:23 PM                                TRESIBA FLEXTOUCH 100 UNIT/ML pen   Inject 5 Units Subcutaneous At Bedtime   Generic drug:  insulin degludec                                * Notice:  This list has 2 medication(s) that are the same as other medications prescribed for you. Read the directions carefully, and ask your doctor or other care provider to review them with you.              More Information        Crohn s Disease    Crohn s disease is inflammation of the intestinal tract that comes and goes in  flare-ups . This is a  "chronic (long-term) illness. During a flare-up, intense abdominal pain and fever may be felt. Mucus, blood, or pus may appear in the stool. Between flare ups, inflammation lessens and there usually are no symptoms. Crohn s disease is a form of inflammatory bowel disease (IBD).   Symptoms of Crohn's disease may include:    Abdominal cramps and pain    Diarrhea, usually bloody, alternating with constipation    Mucus in stools    Rectal bleeding    Rectal pain    Fever    Low energy    Decreased appetite and weight loss  No one knows what exactly causes Crohn's disease, and there is no cure. The goal of treatment is to control and relieve symptoms and prevent complications, so you can lead a full and active life. No single treatment works for everyone, but many things can be done to help.  Diet  Foods did not cause your Crohn's, but they can affect it. Unfortunately, there is no one diet that works for everyone. Below are some things to try. Keep a food log to figure out what you are sensitive to.    Eat more slowly and smaller amounts at a time, but more often. Remember, you can always eat more, but cannot eat less once you've eaten too much.    High fiber foods are complicated. While they may help constipation they can make the bloating, cramping, gas, and diarrhea worse.    Try avoiding dairy products, sometimes this helps    Try cutting out foods that are high in fat and fatty meats    Eat less sugar    Bloating or passing excess gas may be controlled. Be careful with \"gassy\" vegetables and fruits like beans, cabbage, broccoli, and cauliflower.    Be careful of carbonated beverages and fruit juices. They can make the bloating and diarrhea worse.    Caffeine, alcohol, and stimulants may worsen symptoms. These include coffee, tea, sodas, energy drinks, and chocolate.  Lifestyle  Although stress does not cause Crohn's, it is often a factor in flare-ups. It can also affect how you feel about and cope with your " condition.    Look for factors that seem to worsen your symptoms such as stress and emotions.    Counseling can often help relieve stress. So can self-help measures such as exercise, yoga, and meditation.    Depression can be a part of this illness and antidepressants may be prescribed. This may actually help with diarrhea, constipation, and cramping, as well as depression.    Smoking doesn't cause Crohn's, but can make the symptoms worse.  Medicines  Your health care provider may prescribe medicines. If so, take them as directed. For acute flare-ups, prescription medicines can be prescribed. Contact your provider if you need this.    Ask your health care provider before taking any antidiarrheal medicines.    Avoid anti-inflammatory medicines like ibuprofen or naproxen.    Consider nutritional supplements. This is especially true if the diarrhea is prolonged, or you aren't eating or are losing weight.  Follow-up care  Follow up with your healthcare provider, or as advised. If a stool sample was taken or cultures were done, you will be told if your treatment needs to change. Call as directed for the results.  Support  Support groups for persons Crohn s disease can be a source of useful information on how others are coping with this illness. They are available in person, on the phone, or via the Internet. Contact the following resources for more information.    Crohn s and Colitis [employer] 607.220.3510 www.ccfa.org    National Digestive Diseases Information Clearinghouse (NDDIC) 215.555.3873 www.digestive.niddk.nih.gov  When to seek medical advice  Call your health care provider right away if any of these occur:    Fever of 100.4 F (38 C) or higher, or as directed by your health care provider    Abdominal pain that doesn't get better when you take the usual measures    Mucus, pus, or blood in the stool (dark or bright red)    Repeated vomiting    Abdominal swelling and pain that doesn't go away  after a few hours  Call 911  Call 911 if any of these occur:    Trouble breathing    Confusion    Extreme sleepiness or trouble waking up    Fainting or loss of consciousness    Rapid heart rate    1710-4977 [employer]. [address] [place] [address], [place] [address]. All rights reserved. This information is not intended as a substitute for professional medical care. Always follow your healthcare professional's instructions.

## 2017-05-24 VITALS
WEIGHT: 159.39 LBS | OXYGEN SATURATION: 97 % | DIASTOLIC BLOOD PRESSURE: 53 MMHG | RESPIRATION RATE: 12 BRPM | HEART RATE: 80 BPM | SYSTOLIC BLOOD PRESSURE: 114 MMHG | BODY MASS INDEX: 25.62 KG/M2 | TEMPERATURE: 98.4 F | HEIGHT: 66 IN

## 2017-05-24 LAB
ALBUMIN SERPL-MCNC: 3.2 G/DL (ref 3.4–5)
ALP SERPL-CCNC: 95 U/L (ref 40–150)
ALT SERPL W P-5'-P-CCNC: 34 U/L (ref 0–50)
ANION GAP SERPL CALCULATED.3IONS-SCNC: 9 MMOL/L (ref 3–14)
AST SERPL W P-5'-P-CCNC: 16 U/L (ref 0–45)
BASOPHILS # BLD AUTO: 0 10E9/L (ref 0–0.2)
BASOPHILS NFR BLD AUTO: 0.2 %
BILIRUB SERPL-MCNC: 0.2 MG/DL (ref 0.2–1.3)
BUN SERPL-MCNC: 31 MG/DL (ref 7–30)
CALCIUM SERPL-MCNC: 8.2 MG/DL (ref 8.5–10.1)
CHLORIDE SERPL-SCNC: 115 MMOL/L (ref 94–109)
CO2 SERPL-SCNC: 17 MMOL/L (ref 20–32)
CREAT SERPL-MCNC: 1.52 MG/DL (ref 0.52–1.04)
DIFFERENTIAL METHOD BLD: ABNORMAL
EOSINOPHIL # BLD AUTO: 0.1 10E9/L (ref 0–0.7)
EOSINOPHIL NFR BLD AUTO: 1 %
ERYTHROCYTE [DISTWIDTH] IN BLOOD BY AUTOMATED COUNT: 14.8 % (ref 10–15)
GFR SERPL CREATININE-BSD FRML MDRD: 34 ML/MIN/1.7M2
GLUCOSE BLDC GLUCOMTR-MCNC: 125 MG/DL (ref 70–99)
GLUCOSE BLDC GLUCOMTR-MCNC: 128 MG/DL (ref 70–99)
GLUCOSE SERPL-MCNC: 137 MG/DL (ref 70–99)
HCT VFR BLD AUTO: 31.3 % (ref 35–47)
HGB BLD-MCNC: 10 G/DL (ref 11.7–15.7)
IMM GRANULOCYTES # BLD: 0 10E9/L (ref 0–0.4)
IMM GRANULOCYTES NFR BLD: 0.5 %
LYMPHOCYTES # BLD AUTO: 2.7 10E9/L (ref 0.8–5.3)
LYMPHOCYTES NFR BLD AUTO: 32.6 %
MCH RBC QN AUTO: 23.6 PG (ref 26.5–33)
MCHC RBC AUTO-ENTMCNC: 31.9 G/DL (ref 31.5–36.5)
MCV RBC AUTO: 74 FL (ref 78–100)
MONOCYTES # BLD AUTO: 0.7 10E9/L (ref 0–1.3)
MONOCYTES NFR BLD AUTO: 9.1 %
NEUTROPHILS # BLD AUTO: 4.6 10E9/L (ref 1.6–8.3)
NEUTROPHILS NFR BLD AUTO: 56.6 %
NRBC # BLD AUTO: 0 10*3/UL
NRBC BLD AUTO-RTO: 0 /100
PLATELET # BLD AUTO: 154 10E9/L (ref 150–450)
POTASSIUM SERPL-SCNC: 4 MMOL/L (ref 3.4–5.3)
PROT SERPL-MCNC: 6.6 G/DL (ref 6.8–8.8)
RBC # BLD AUTO: 4.24 10E12/L (ref 3.8–5.2)
SODIUM SERPL-SCNC: 141 MMOL/L (ref 133–144)
WBC # BLD AUTO: 8.1 10E9/L (ref 4–11)

## 2017-05-24 PROCEDURE — 36415 COLL VENOUS BLD VENIPUNCTURE: CPT | Performed by: FAMILY MEDICINE

## 2017-05-24 PROCEDURE — G0378 HOSPITAL OBSERVATION PER HR: HCPCS

## 2017-05-24 PROCEDURE — 85025 COMPLETE CBC W/AUTO DIFF WBC: CPT | Performed by: FAMILY MEDICINE

## 2017-05-24 PROCEDURE — 25000125 ZZHC RX 250: Performed by: FAMILY MEDICINE

## 2017-05-24 PROCEDURE — 25000132 ZZH RX MED GY IP 250 OP 250 PS 637: Mod: GY | Performed by: FAMILY MEDICINE

## 2017-05-24 PROCEDURE — 00000146 ZZHCL STATISTIC GLUCOSE BY METER IP

## 2017-05-24 PROCEDURE — 96361 HYDRATE IV INFUSION ADD-ON: CPT

## 2017-05-24 PROCEDURE — 80053 COMPREHEN METABOLIC PANEL: CPT | Performed by: FAMILY MEDICINE

## 2017-05-24 PROCEDURE — A9270 NON-COVERED ITEM OR SERVICE: HCPCS | Mod: GY | Performed by: FAMILY MEDICINE

## 2017-05-24 PROCEDURE — 96360 HYDRATION IV INFUSION INIT: CPT

## 2017-05-24 RX ADMIN — SIMVASTATIN 10 MG: 10 TABLET, FILM COATED ORAL at 08:42

## 2017-05-24 RX ADMIN — PANTOPRAZOLE SODIUM 40 MG: 40 TABLET, DELAYED RELEASE ORAL at 06:49

## 2017-05-24 RX ADMIN — GABAPENTIN 300 MG: 300 CAPSULE ORAL at 08:42

## 2017-05-24 RX ADMIN — GABAPENTIN 300 MG: 300 CAPSULE ORAL at 11:27

## 2017-05-24 RX ADMIN — ASPIRIN AND DIPYRIDAMOLE 1 CAPSULE: 25; 200 CAPSULE, EXTENDED RELEASE ORAL at 08:42

## 2017-05-24 RX ADMIN — POTASSIUM CHLORIDE AND SODIUM CHLORIDE: 900; 150 INJECTION, SOLUTION INTRAVENOUS at 11:27

## 2017-05-24 RX ADMIN — METOPROLOL TARTRATE 25 MG: 25 TABLET, FILM COATED ORAL at 08:41

## 2017-05-24 RX ADMIN — POTASSIUM CHLORIDE AND SODIUM CHLORIDE: 900; 150 INJECTION, SOLUTION INTRAVENOUS at 03:30

## 2017-05-24 RX ADMIN — TRAMADOL HYDROCHLORIDE 50 MG: 50 TABLET, COATED ORAL at 13:23

## 2017-05-24 ASSESSMENT — PAIN DESCRIPTION - DESCRIPTORS: DESCRIPTORS: ACHING;CRAMPING

## 2017-05-24 NOTE — PLAN OF CARE
Face to face report given with opportunity to observe patient.    Report given to Ligia MARLEY.    Sarah Randolph RN.  5/23/2017  11:03 PM

## 2017-05-24 NOTE — DISCHARGE SUMMARY
[facility]    Discharge Summary  Hospitalist    Date of Admission:  5/23/2017  Date of Discharge:  5/24/2017  Discharging Provider: Matthew Villela  Date of Service (when I saw the patient): 05/24/17      Hospital Course   Cee Pereira was admitted on 5/23/2017.  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)    Assessment: improved control now BS much better    Plan: home with current insulin orders    Crohn's disease of large intestine without complication (H)    Assessment: stable no problems    Plan: go back on plaquenil as before        Matthew Villela    Significant Results and Procedures       Pending Results   These results will be followed up by janet  Unresulted Labs Ordered in the Past 30 Days of this Admission     Date and Time Order Name Status Description    5/23/2017 1345 Stool culture SSCE Preliminary           Code Status   Full Code       Primary Care Physician   Matthew Villela    Physical Exam   Temp: 98.4  F (36.9  C) Temp src: Tympanic BP: 114/53 Pulse: 80 Heart Rate: 74 Resp: 12 SpO2: 97 % O2 Device: None (Room air)    Vitals:    05/23/17 1900   Weight: 72.3 kg (159 lb 6.3 oz)     Vital Signs with Ranges  Temp:  [98.2  F (36.8  C)-99.2  F (37.3  C)] 98.4  F (36.9  C)  Pulse:  [70-80] 80  Heart Rate:  [74-88] 74  Resp:  [12-20] 12  BP: (114-143)/(53-68) 114/53  SpO2:  [96 %-98 %] 97 %  I/O last 3 completed shifts:  In: 2555 [P.O.:630; I.V.:1925]  Out: -     Respiratory: No increased work of breathing, good air exchange, clear to auscultation bilaterally, no crackles or wheezing  Cardiovascular: Normal apical impulse, regular rate and rhythm, normal S1  and S2, no S3 or S4, and no murmur noted  GI: No scars, normal bowel sounds, soft, non-distended, non-tender, no masses palpated, no hepatosplenomegally    Discharge Disposition   Discharged to home  Condition at discharge: Stable    Consultations This Hospital Stay   None    Time Spent on this Encounter   I, Matthew Villela, personally saw the patient today and spent less than or equal to 30 minutes discharging this patient.    Discharge Orders     Follow-up and recommended labs and tests    Follow up with primary care provider, Matthew Villela, within 2 weeks, for hospital follow- up. No follow up labs or test are needed.     Activity   Your activity upon discharge: activity as tolerated     Diet   Follow this diet upon discharge: Moderate consistent carbohydrate (0769-5686 connie / 4-6 CHO units per meal)       Discharge Medications   Current Discharge Medication List      CONTINUE these medications which have NOT CHANGED    Details   insulin degludec (TRESIBA FLEXTOUCH) 100 UNIT/ML pen Inject 5 Units Subcutaneous At Bedtime      insulin aspart (INSULIN ASPART) 100 UNIT/ML injection Inject Subcutaneous 3 times daily (with meals)      METHOCARBAMOL PO Take 750 mg by mouth every 6 hours as needed       hydroxychloroquine (PLAQUENIL) 200 MG tablet Take 200 mg by mouth 2 times daily      TRAMADOL HCL PO Take 50 mg by mouth every 6 hours as needed for moderate to severe pain      Celecoxib (CELEBREX PO) Take 200 mg by mouth 2 times daily       !! GABAPENTIN PO Take 300 mg by mouth 2 times daily AM, second dose at 1200      !! GABAPENTIN PO Take 600 mg by mouth At Bedtime      aspirin-dipyridamole (AGGRENOX)  MG per 12 hr capsule Take 1 capsule by mouth daily      SIMVASTATIN PO Take 10 mg by mouth daily      METOPROLOL TARTRATE PO Take 25 mg by mouth daily       PANTOPRAZOLE SODIUM PO Take 40 mg by mouth every morning (before breakfast)      Coenzyme Q10 (COQ-10 PO) Take 100 mg by mouth daily       !! -  Potential duplicate medications found. Please discuss with provider.      STOP taking these medications       DOCUSATE SODIUM PO Comments:   Reason for Stopping:             Allergies   Allergies   Allergen Reactions     Ivp Dye [Contrast Dye]      Lortab [Hydrocodone-Acetaminophen]      headaches     Pioglitazone Itching     Data   Most Recent 3 CBC's:  Recent Labs   Lab Test  05/24/17 0526 05/21/17 2040 05/20/17 0247   WBC  8.1  9.3  9.1   HGB  10.0*  11.6*  11.2*   MCV  74*  75*  76*   PLT  154  186  146*      Most Recent 3 BMP's:  Recent Labs   Lab Test  05/24/17 0526 05/21/17 2040 05/20/17 0247   NA  141  137  137   POTASSIUM  4.0  4.1  4.5   CHLORIDE  115*  107  105   CO2  17*  16*  22   BUN  31*  31*  22   CR  1.52*  1.80*  1.74*   ANIONGAP  9  14  10   ELVIS  8.2*  9.0  9.5   GLC  137*  273*  287*     Most Recent 2 LFT's:  Recent Labs   Lab Test  05/24/17 0526 05/21/17 2040   AST  16  36   ALT  34  67*   ALKPHOS  95  107   BILITOTAL  0.2  0.3     Most Recent INR's and Anticoagulation Dosing History:  Anticoagulation Dose History     Recent Dosing and Labs Latest Ref Rng & Units 8/17/2016    INR 0.80 - 1.20 1.12        Most Recent 3 Troponin's:  Recent Labs   Lab Test  05/21/17 2040 05/20/17 0247  03/20/17   0800   TROPI  <0.015  The 99th percentile for upper reference range is 0.045 ug/L.  Troponin values in   the range of 0.045 - 0.120 ug/L may be associated with risks of adverse   clinical events.    <0.015  The 99th percentile for upper reference range is 0.045 ug/L.  Troponin values in   the range of 0.045 - 0.120 ug/L may be associated with risks of adverse   clinical events.    <0.015  The 99th percentile for upper reference range is 0.045 ug/L.  Troponin values in   the range of 0.045 - 0.120 ug/L may be associated with risks of adverse   clinical events.       Most Recent Cholesterol Panel:No lab results found.  Most Recent 6 Bacteria Isolates From Any Culture (See EPIC  Reports for Culture Details):  Recent Labs   Lab Test  05/23/17   1345  05/20/17   0211  08/17/16   2240  08/07/13   2346   CULT  Culture in progress  No Salmonella, Shigella, Campylobacter, E. coli O157, Aeromonas, or Plesiomonas   isolated. No Yersinia enterocolitica isolated    No MRSA isolated  >100,000 colonies/mL Mixed bacterial ed No further identification or  sensitivity done     Most Recent TSH, T4 and A1c Labs:No lab results found.

## 2017-05-24 NOTE — PLAN OF CARE
Patient discharged at 2:30 PM via wheel chair accompanied by spouse and staff. Prescriptions - None ordered for discharge. All belongings sent with patient.     Discharge instructions reviewed with patient. Listed belongings gathered and returned to patient. Clothing    Patient discharged to home.   Report called to n/a    Core Measures and Vaccines  Core Measures applicable during stay: No.   Pneumonia and Influenza given prior to discharge, if indicated: No      MISC  Follow up appointment made:  No patient will set up  Home and hospital aquired medications returned to patient: N/A  Patient reports pain was well managed at discharge: Yes

## 2017-05-24 NOTE — PLAN OF CARE
Problem: Goal Outcome Summary  Goal: Goal Outcome Summary  diagnostic tests and consults completed and resulted   -vital signs normal or at patient baseline   Outcome: Improving  Uneventful night, no diarrhea, no nausea. Tolerating PO intake of fluids tonight. No tenderness in abdomen, hyperactive bowel sounds throughout.  NS w/20k@ 125.   VSS. Afebrile. Lung sounds jodie. No edema.

## 2017-05-24 NOTE — PLAN OF CARE
Problem: Goal Outcome Summary  Goal: Goal Outcome Summary  diagnostic tests and consults completed and resulted   -vital signs normal or at patient baseline   Outcome: Improving  Pt denies pain or n/v. Pt tolerating po intake and reports to still have loose stools but less than admission, pt states her stools are normally loose. Hyperactive bowel sounds. Pt up ind in room, gait steady. VSS. IVF's remain infusing.

## 2017-05-24 NOTE — PLAN OF CARE
Problem: Goal Outcome Summary  Goal: Goal Outcome Summary  diagnostic tests and consults completed and resulted   -vital signs normal or at patient baseline  Outcome: Improving  Pt states she is feeling better. Tolerating liquids PO: milk, soda, water.   IV fluids [id number].  Voiding.  Bowel sounds hyperactive-states tenderness in abd has resolved at this time.   No nausea.

## 2017-05-24 NOTE — PLAN OF CARE
Problem: Patient Goal: Social Work Focus  Goal: 1. Patient Goal: Social Work Focus  Outcome: Adequate for Discharge Date Met:  05/24/17  Name: Cee Pereira     MRN#: [id number]     Reason for Hospitalization: Gastroenteritis & dehydration  Gastroenteritis     XIMENA: none     Discharge Date: 5/24/2017     Patient / Family response to discharge plan: agreeable     Follow-Up Appt: No future appointments.     Other Providers (Care Coordinator, County Services, PCA services etc): No     Discharge Disposition: home-  will be here to pick her up.       Maris Llamas

## 2017-05-24 NOTE — PHARMACY
[facility]    Pharmacy    Antimicrobial Stewardship Note     Current antimicrobial therapy:  Anti-infectives (Future)    Start     Dose/Rate Route Frequency Ordered Stop    05/23/17 2100  hydroxychloroquine (PLAQUENIL) tablet 200 mg      200 mg Oral AT BEDTIME 05/23/17 1706          Indication: Crohn's disease     Pertinent labs:  Creatinine   Creatinine   Date Value Ref Range Status   05/24/2017 1.52 (H) 0.52 - 1.04 mg/dL Final   05/21/2017 1.80 (H) 0.52 - 1.04 mg/dL Final   05/20/2017 1.74 (H) 0.52 - 1.04 mg/dL Final     WBC   WBC   Date Value Ref Range Status   05/24/2017 8.1 4.0 - 11.0 10e9/L Final   05/21/2017 9.3 4.0 - 11.0 10e9/L Final   05/20/2017 9.1 4.0 - 11.0 10e9/L Final     ANC No components found for: ANC     Culture Results:       Stool culture SSCE [[id number]] Collected: 05/23/17 1345       Order Status: Completed Updated: 05/23/17 2213       Clostridium difficile toxin B PCR [[id number]] Collected: 05/23/17 1345       Order Status: Completed Updated: 05/23/17 1604        Specimen Description Feces        C Diff Toxin B PCR --        Negative   Negative: Clostridium difficile target DNA sequences NOT detected, presumed    negative for Clostridium difficile toxin B or the number of bacteria present    may be below the limit of detection for the test.    FDA approved assay performed using [employer] GeneXpert real-time PCR.    A negative result does not exclude actual disease due to Clostridium difficile    and may be due to improper collection, handling and storage of the specimen or    the number of organisms in the specimen is below the detection limit of the    assay.            Recommendations/Interventions:  1. None at this time    Mary Kay Arreaga, Pharmacy Intern  May 24, 2017

## 2017-05-24 NOTE — PLAN OF CARE
Problem: Patient Goal: Social Work Focus  Goal: 1. Patient Goal: Social Work Focus  Met with Cee for CM/SS assessment and MOON letter completion. See flow sheet for completed assessment.       Cee lives independently at home with her , Greg.  She independent with ADL's, driving, finances and medications.  She does the cooking, cleaning and laundry.  Greg does the yard work.  Her home is one level and is handicap accessible.  There are grab bars, a raided toilet, bath bench and a sit down tub.  Her and her  live in [place] from December to April.  She has three children; two boys and one girl, nine grandchildren and a great grandchild on the way.  She identifies her  and children as her support system.  She enjoys knitting and crocheting.  She identifies as Episcopal and this is important to her even though she is not active in a [employer].  Cee is not connected with support services.  She is not a .       Cee sees Dr. Villela for primary care and was last seen yesterday prior to being admitted to the hospital.  She sees a dentist in [place].  She sees Dr. Bui for eye care and was last seen July 2016, and goes every year.  She uses [facility] for her prescriptions.       XIMENA score is identified as none. Cee is eager to return home now that she is feeling better.  She states that her  will be in to pick her up.  Denies questions or concerns.  SS will remain available.

## 2017-05-24 NOTE — PLAN OF CARE
Face to face report given with opportunity to observe patient.    Report given to Dulce Preciado   5/24/2017  7:31 AM

## 2017-05-25 ENCOUNTER — TELEPHONE (OUTPATIENT)
Dept: CASE MANAGEMENT | Facility: HOSPITAL | Age: 72
End: 2017-05-25

## 2017-05-25 NOTE — TELEPHONE ENCOUNTER
Cee Pereira, was discharged to home on 5/24/17  from [facility]. I spoke today with Cee  regarding  Her  discharge.   She  indicates she  receive(d) sufficient information upon discharge. Medications were reviewed in full on discharge, including: medications to be stopped; medications to be continued from preadmission and any side effects. Prescriptions  - none ordered for discharge.  Medications are being taken as prescribed.   She  indicates she still  needs  to schedule an appointment with her    PCP. She stated she will get this done.  She  did not have any questions regarding discharge instructions or condition.  Per their request, the following employee (s) can be recognized for their outstanding services delivered:  Care was good.  Suggestions to improve service: nothing indicated  She  was informed she  may receive a survey in the mail from the hospital. Asked if she  would kindly complete the survey in order for [facility] to know if services fully met patient needs.

## 2017-05-26 LAB
BACTERIA SPEC CULT: NORMAL
E COLI SXT1+2 STL IA: NORMAL
MICRO REPORT STATUS: NORMAL
SPECIMEN SOURCE: NORMAL

## 2017-06-07 LAB — GLUCOSE BLDC GLUCOMTR-MCNC: 179 MG/DL (ref 70–99)

## 2017-11-01 ENCOUNTER — HOSPITAL ENCOUNTER (OUTPATIENT)
Dept: CT IMAGING | Facility: HOSPITAL | Age: 72
Discharge: HOME | End: 2017-11-01
Attending: FAMILY MEDICINE | Admitting: FAMILY MEDICINE
Payer: MEDICARE

## 2017-11-01 DIAGNOSIS — K12.2 INFECTION OF MOUTH: ICD-10-CM

## 2017-11-01 PROCEDURE — 70486 CT MAXILLOFACIAL W/O DYE: CPT | Mod: TC

## 2018-04-22 ENCOUNTER — APPOINTMENT (OUTPATIENT)
Dept: GENERAL RADIOLOGY | Facility: HOSPITAL | Age: 73
End: 2018-04-22
Attending: EMERGENCY MEDICINE
Payer: MEDICARE

## 2018-04-22 ENCOUNTER — HOSPITAL ENCOUNTER (EMERGENCY)
Facility: HOSPITAL | Age: 73
Discharge: HOME | End: 2018-04-22
Attending: EMERGENCY MEDICINE | Admitting: EMERGENCY MEDICINE
Payer: MEDICARE

## 2018-04-22 VITALS
RESPIRATION RATE: 16 BRPM | TEMPERATURE: 97.9 F | HEART RATE: 78 BPM | OXYGEN SATURATION: 96 % | SYSTOLIC BLOOD PRESSURE: 127 MMHG | DIASTOLIC BLOOD PRESSURE: 79 MMHG

## 2018-04-22 DIAGNOSIS — R07.89 ATYPICAL CHEST PAIN: Primary | ICD-10-CM

## 2018-04-22 LAB
ALBUMIN SERPL-MCNC: 3.8 G/DL (ref 3.4–5)
ALP SERPL-CCNC: 108 U/L (ref 40–150)
ALT SERPL W P-5'-P-CCNC: 27 U/L (ref 0–50)
ANION GAP SERPL CALCULATED.3IONS-SCNC: 8 MMOL/L (ref 3–14)
AST SERPL W P-5'-P-CCNC: 18 U/L (ref 0–45)
BASOPHILS # BLD AUTO: 0 10E9/L (ref 0–0.2)
BASOPHILS NFR BLD AUTO: 0.5 %
BILIRUB SERPL-MCNC: 0.2 MG/DL (ref 0.2–1.3)
BUN SERPL-MCNC: 27 MG/DL (ref 7–30)
CALCIUM SERPL-MCNC: 9 MG/DL (ref 8.5–10.1)
CHLORIDE SERPL-SCNC: 105 MMOL/L (ref 94–109)
CK SERPL-CCNC: 83 U/L (ref 30–225)
CO2 SERPL-SCNC: 27 MMOL/L (ref 20–32)
CREAT SERPL-MCNC: 1.3 MG/DL (ref 0.52–1.04)
D DIMER PPP DDU-MCNC: 204 NG/ML D-DU (ref 0–300)
DIFFERENTIAL METHOD BLD: ABNORMAL
EOSINOPHIL # BLD AUTO: 0.2 10E9/L (ref 0–0.7)
EOSINOPHIL NFR BLD AUTO: 2.1 %
ERYTHROCYTE [DISTWIDTH] IN BLOOD BY AUTOMATED COUNT: 13.3 % (ref 10–15)
GFR SERPL CREATININE-BSD FRML MDRD: 40 ML/MIN/1.7M2
GLUCOSE SERPL-MCNC: 224 MG/DL (ref 70–99)
HCT VFR BLD AUTO: 42.6 % (ref 35–47)
HGB BLD-MCNC: 14.4 G/DL (ref 11.7–15.7)
IMM GRANULOCYTES # BLD: 0 10E9/L (ref 0–0.4)
IMM GRANULOCYTES NFR BLD: 0.3 %
LACTATE SERPL-SCNC: 1.2 MMOL/L (ref 0.4–2)
LYMPHOCYTES # BLD AUTO: 3.1 10E9/L (ref 0.8–5.3)
LYMPHOCYTES NFR BLD AUTO: 38.3 %
MAGNESIUM SERPL-MCNC: 1.6 MG/DL (ref 1.6–2.3)
MCH RBC QN AUTO: 27.8 PG (ref 26.5–33)
MCHC RBC AUTO-ENTMCNC: 33.8 G/DL (ref 31.5–36.5)
MCV RBC AUTO: 82 FL (ref 78–100)
MONOCYTES # BLD AUTO: 0.6 10E9/L (ref 0–1.3)
MONOCYTES NFR BLD AUTO: 7.3 %
NEUTROPHILS # BLD AUTO: 4.1 10E9/L (ref 1.6–8.3)
NEUTROPHILS NFR BLD AUTO: 51.5 %
NRBC # BLD AUTO: 0 10*3/UL
NRBC BLD AUTO-RTO: 0 /100
NT-PROBNP SERPL-MCNC: 56 PG/ML (ref 0–900)
PLATELET # BLD AUTO: 92 10E9/L (ref 150–450)
POTASSIUM SERPL-SCNC: 3.9 MMOL/L (ref 3.4–5.3)
PROT SERPL-MCNC: 7.9 G/DL (ref 6.8–8.8)
RBC # BLD AUTO: 5.18 10E12/L (ref 3.8–5.2)
SODIUM SERPL-SCNC: 140 MMOL/L (ref 133–144)
TROPONIN I SERPL-MCNC: <0.015 UG/L (ref 0–0.04)
TROPONIN I SERPL-MCNC: <0.015 UG/L (ref 0–0.04)
WBC # BLD AUTO: 8 10E9/L (ref 4–11)

## 2018-04-22 PROCEDURE — 25000132 ZZH RX MED GY IP 250 OP 250 PS 637: Mod: GY | Performed by: EMERGENCY MEDICINE

## 2018-04-22 PROCEDURE — 82550 ASSAY OF CK (CPK): CPT | Performed by: EMERGENCY MEDICINE

## 2018-04-22 PROCEDURE — 83880 ASSAY OF NATRIURETIC PEPTIDE: CPT | Mod: GZ | Performed by: EMERGENCY MEDICINE

## 2018-04-22 PROCEDURE — 80053 COMPREHEN METABOLIC PANEL: CPT | Performed by: EMERGENCY MEDICINE

## 2018-04-22 PROCEDURE — A9270 NON-COVERED ITEM OR SERVICE: HCPCS | Mod: GY | Performed by: EMERGENCY MEDICINE

## 2018-04-22 PROCEDURE — 99285 EMERGENCY DEPT VISIT HI MDM: CPT | Mod: 25

## 2018-04-22 PROCEDURE — 71046 X-RAY EXAM CHEST 2 VIEWS: CPT | Mod: TC

## 2018-04-22 PROCEDURE — 36415 COLL VENOUS BLD VENIPUNCTURE: CPT | Performed by: EMERGENCY MEDICINE

## 2018-04-22 PROCEDURE — 25000125 ZZHC RX 250: Performed by: EMERGENCY MEDICINE

## 2018-04-22 PROCEDURE — 83735 ASSAY OF MAGNESIUM: CPT | Performed by: EMERGENCY MEDICINE

## 2018-04-22 PROCEDURE — 99285 EMERGENCY DEPT VISIT HI MDM: CPT | Mod: Z6 | Performed by: EMERGENCY MEDICINE

## 2018-04-22 PROCEDURE — 83605 ASSAY OF LACTIC ACID: CPT | Performed by: EMERGENCY MEDICINE

## 2018-04-22 PROCEDURE — 85025 COMPLETE CBC W/AUTO DIFF WBC: CPT | Performed by: EMERGENCY MEDICINE

## 2018-04-22 PROCEDURE — 85379 FIBRIN DEGRADATION QUANT: CPT | Performed by: EMERGENCY MEDICINE

## 2018-04-22 PROCEDURE — 84484 ASSAY OF TROPONIN QUANT: CPT | Performed by: EMERGENCY MEDICINE

## 2018-04-22 PROCEDURE — 93005 ELECTROCARDIOGRAM TRACING: CPT

## 2018-04-22 RX ORDER — ONDANSETRON 4 MG/1
4 TABLET, ORALLY DISINTEGRATING ORAL ONCE
Status: COMPLETED | OUTPATIENT
Start: 2018-04-22 | End: 2018-04-22

## 2018-04-22 RX ORDER — ASPIRIN 81 MG/1
324 TABLET, CHEWABLE ORAL ONCE
Status: COMPLETED | OUTPATIENT
Start: 2018-04-22 | End: 2018-04-22

## 2018-04-22 RX ADMIN — ONDANSETRON 4 MG: 4 TABLET, ORALLY DISINTEGRATING ORAL at 06:07

## 2018-04-22 RX ADMIN — ASPIRIN 81 MG 324 MG: 81 TABLET ORAL at 06:07

## 2018-04-22 NOTE — DISCHARGE INSTRUCTIONS
*CHEST PAIN, UNCERTAIN CAUSE    Based on your exam today, the exact cause of your chest pain is not certain. Your condition does not seem serious at this time, and your pain does not appear to be coming from your heart. However, sometimes the signs of a serious problem take more time to appear. Therefore, watch for the warning signs listed below.  HOME CARE:    1. Rest today and avoid strenuous activity.  2. Take any prescribed medicine as directed.  FOLLOW UP with your doctor in 1-3 days.   GET PROMPT MEDICAL ATTENTION if any of the following occur:    A change in the type of pain: if it feels different, becomes more severe, lasts longer, or begins to spread into your shoulder, arm, neck, jaw or back    Shortness of breath or increased pain with breathing    Weakness, dizziness, or fainting    Cough with blood or dark colored sputum (phlegm)    Fever over 101  F (38.3  C)    Swelling, pain or redness in one leg    1975-0177 [employer]. [address] [place] [address]. All rights reserved. This information is not intended as a substitute for professional medical care. Always follow your healthcare professional's instructions.  This information has been modified by your health care provider with permission from the publisher.

## 2018-04-22 NOTE — ED AVS SNAPSHOT
[facility]    [address] [address]    Phone:  [phone]                                       Cee Pereira   MRN: [id number]    Department:  [facility]   Date of Visit:  4/22/2018           Patient Information     Date Of Birth          1945        Your diagnoses for this visit were:     Atypical chest pain        You were seen by Albert Muller MD.      Follow-up Information     Follow up with Matthew Villela DO In 1 day.    Specialty:  Family Practice    Why:  Continuity of care, schedule cardiac stress test    Contact information:    [facility]  [address] [facility] [address]  [phone]          Discharge Instructions          *CHEST PAIN, UNCERTAIN CAUSE    Based on your exam today, the exact cause of your chest pain is not certain. Your condition does not seem serious at this time, and your pain does not appear to be coming from your heart. However, sometimes the signs of a serious problem take more time to appear. Therefore, watch for the warning signs listed below.  HOME CARE:    1. Rest today and avoid strenuous activity.  2. Take any prescribed medicine as directed.  FOLLOW UP with your doctor in 1-3 days.   GET PROMPT MEDICAL ATTENTION if any of the following occur:    A change in the type of pain: if it feels different, becomes more severe, lasts longer, or begins to spread into your shoulder, arm, neck, jaw or back    Shortness of breath or increased pain with breathing    Weakness, dizziness, or fainting    Cough with blood or dark colored sputum (phlegm)    Fever over 101  F (38.3  C)    Swelling, pain or redness in one leg    5919-6322 [employer]. [address] [place] [address]. All rights reserved. This information is not intended as a substitute for professional medical care. Always follow your healthcare professional's instructions.  This information has been modified by your health  care provider with permission from the publisher.         Review of your medicines      Our records show that you are taking the medicines listed below. If these are incorrect, please call your family doctor or clinic.        Dose / Directions Last dose taken    aspirin-dipyridamole  MG per 12 hr capsule   Commonly known as:  AGGRENOX   Dose:  1 capsule        Take 1 capsule by mouth daily   Refills:  0        CELEBREX PO   Dose:  200 mg        Take 200 mg by mouth 2 times daily   Refills:  0        COQ-10 PO   Dose:  100 mg        Take 100 mg by mouth daily   Refills:  0        * GABAPENTIN PO   Dose:  300 mg        Take 300 mg by mouth 2 times daily AM, second dose at 1200   Refills:  0        * GABAPENTIN PO   Dose:  600 mg        Take 600 mg by mouth At Bedtime   Refills:  0        METOPROLOL TARTRATE PO   Dose:  25 mg        Take 25 mg by mouth daily   Refills:  0        PANTOPRAZOLE SODIUM PO   Dose:  40 mg        Take 40 mg by mouth every morning (before breakfast)   Refills:  0        SIMVASTATIN PO   Dose:  10 mg        Take 10 mg by mouth daily   Refills:  0        TRESIBA FLEXTOUCH 100 UNIT/ML pen   Dose:  24 Units   Generic drug:  insulin degludec        Inject 24 Units Subcutaneous At Bedtime   Refills:  0        * Notice:  This list has 2 medication(s) that are the same as other medications prescribed for you. Read the directions carefully, and ask your doctor or other care provider to review them with you.            Procedures and tests performed during your visit     CBC with platelets differential    CK total    Cardiac Continuous Monitoring    Comprehensive metabolic panel    D-Dimer ([facility])    Lactic acid    Magnesium    Nt probnp inpatient (BNP)    Peripheral IV: Standard    Pulse oximetry nursing    Review medications with patient    Troponin I    Troponin I (second draw)    XR Chest 2 Views      Orders Needing Specimen Collection     None      Pending Results     No orders found from  "2018 to 2018.            Pending Culture Results     No orders found from 2018 to 2018.            Thank you for choosing [facility]       Thank you for choosing [facility] for your care. Our goal is always to provide you with excellent care. Hearing back from our patients is one way we can continue to improve our services. Please take a few minutes to complete the written survey that you may receive in the mail after you visit with us. Thank you!        [employer] Information     [employer] lets you send messages to your doctor, view your test results, renew your prescriptions, schedule appointments and more. To sign up, go to [email or web address] . Click on \"Log in\" on the left side of the screen, which will take you to the Welcome page. Then click on \"Sign up Now\" on the right side of the page.     You will be asked to enter the access code listed below, as well as some personal information. Please follow the directions to create your username and password.     Your access code is: [email or web address]  Expires: 2018  7:58 AM     Your access code will  in 90 days. If you need help or a new code, please call your [facility] clinic or [phone].        Care EveryWhere ID     This is your Care EveryWhere ID. This could be used by other organizations to access your [facility] medical records  [id number]        Equal Access to Services     ESTELITA GOVEA AH: Hadaudrey Urrutia, waaxda opal, qaybta kaalmiguel angel negro. So [facility] [phone].    ATENCIÓN: Si habla español, tiene a garcia disposición servicios gratuitos de asistencia lingüística. Sebastian al [phone].    We comply with applicable federal civil rights laws and [place] laws. We do not discriminate on the basis of race, color, national origin, age, disability, sex, sexual orientation, or gender identity.            After Visit Summary       This is your record. Keep this with you and show " to your community pharmacist(s) and doctor(s) at your next visit.

## 2018-04-22 NOTE — ED PROVIDER NOTES
History     Chief Complaint   Patient presents with     Chest Pain     onset upon waking this am. Midsternal pain radiating to bilateral shoulders and neck. Describes the pain as squeezing.      HPI  Cee Pereira is a 72 year old female who presents emergency department with complaints of chest pain.  Patient woke up approximately 1 hour ago with retrosternal squeezing chest pain that radiated to the left shoulder and elbow.  Pain was rated as 8/10 and she took one nitroglycerin tablet each actually relieved the pain.  Now the pain is rated as 2/10.  She also felt nauseated but did not throw up.  She is a known diabetic hypertensive and also has hypercholesterolemia.  She has never had myocardial infarction in the past.  She denies any fever, cough, shortness of breath or palpitations.  There are no known aggravating factors for the pain but the pain was relieved by nitroglycerin.    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  Document previously scanned on 08/18/2016.  Validation form completed and sent to be scanned.  Code Status reflects choices in most recent ACP document.  Confirmed/documented designated decision maker(s).  Added by Mary Weston             Small bowel obstruction 08/17/2016     Priority: Medium     SBO (small bowel obstruction) 08/17/2016     Priority: Medium        Past Medical History:    Past Medical History:    Diagnosis Date     Diabetes mellitus (H)      Diverticulitis      Herniated lumbar intervertebral disc        Past Surgical History:    Past Surgical History:   Procedure Laterality Date     APPENDECTOMY OPEN       CHOLECYSTECTOMY       COLECTOMY WITHOUT COLOSTOMY      for diverticulitis     DECOMPRESSION LUMBAR ONE LEVEL  7/31/2013    L4-L5     JOINT REPLACEMENT      bilateral hip replacements       Family History:    No family history on file.    Social History:  Marital Status:   [2]  Social History   Substance Use Topics     Smoking status: Former Smoker     Smokeless tobacco: Not on file     Alcohol use Not on file        Medications:      aspirin-dipyridamole (AGGRENOX)  MG per 12 hr capsule   Celecoxib (CELEBREX PO)   Coenzyme Q10 (COQ-10 PO)   GABAPENTIN PO   GABAPENTIN PO   insulin degludec (TRESIBA FLEXTOUCH) 100 UNIT/ML pen   METOPROLOL TARTRATE PO   PANTOPRAZOLE SODIUM PO   SIMVASTATIN PO         Review of Systems   Cardiovascular: Positive for chest pain.   All other systems reviewed and are negative.      Physical Exam   BP: 134/82  Pulse: 78  Heart Rate: 71  Temp: 98  F (36.7  C)  Resp: 18  SpO2: 95 %      Physical Exam   Constitutional: She is oriented to person, place, and time. She appears well-developed and well-nourished. No distress.   HENT:   Head: Normocephalic and atraumatic.   Eyes: Pupils are equal, round, and reactive to light. No scleral icterus.   Cardiovascular: Normal rate, regular rhythm and normal heart sounds.    Pulmonary/Chest: Effort normal and breath sounds normal. No respiratory distress. She has no wheezes.   Abdominal: Soft. Bowel sounds are normal. She exhibits no distension. There is no tenderness.   Musculoskeletal: She exhibits no edema.   Neurological: She is alert and oriented to person, place, and time. No cranial nerve deficit.   Skin: She is not diaphoretic.   Nursing note and vitals reviewed.      ED Course   Patient evaluated and laboratory tests  ordered.  Reviewed EKG showing normal sinus rhythm.  Aspirin 324 mg oral given.  Chest x-ray ordered.    ED Course     Procedures               EKG Interpretation:      Interpreted by Albert Muller  Time reviewed: 06:20 AM  Symptoms at time of EKG: chest pain   Rhythm: normal sinus   Rate: normal  Axis: normal  Ectopy: none  Conduction: normal  ST Segments/ T Waves: No ST-T wave changes  Q Waves: none  Comparison to prior: No old EKG available    Clinical Impression: normal EKG              Results for orders placed or performed during the hospital encounter of 04/22/18 (from the past 24 hour(s))   CBC with platelets differential   Result Value Ref Range    WBC 8.0 4.0 - 11.0 10e9/L    RBC Count 5.18 3.8 - 5.2 10e12/L    Hemoglobin 14.4 11.7 - 15.7 g/dL    Hematocrit 42.6 35.0 - 47.0 %    MCV 82 78 - 100 fl    MCH 27.8 26.5 - 33.0 pg    MCHC 33.8 31.5 - 36.5 g/dL    RDW 13.3 10.0 - 15.0 %    Platelet Count 92 (L) 150 - 450 10e9/L    Diff Method Automated Method     % Neutrophils 51.5 %    % Lymphocytes 38.3 %    % Monocytes 7.3 %    % Eosinophils 2.1 %    % Basophils 0.5 %    % Immature Granulocytes 0.3 %    Nucleated RBCs 0 0 /100    Absolute Neutrophil 4.1 1.6 - 8.3 10e9/L    Absolute Lymphocytes 3.1 0.8 - 5.3 10e9/L    Absolute Monocytes 0.6 0.0 - 1.3 10e9/L    Absolute Eosinophils 0.2 0.0 - 0.7 10e9/L    Absolute Basophils 0.0 0.0 - 0.2 10e9/L    Abs Immature Granulocytes 0.0 0 - 0.4 10e9/L    Absolute Nucleated RBC 0.0    Troponin I   Result Value Ref Range    Troponin I ES <0.015 0.000 - 0.045 ug/L   Comprehensive metabolic panel   Result Value Ref Range    Sodium 140 133 - 144 mmol/L    Potassium 3.9 3.4 - 5.3 mmol/L    Chloride 105 94 - 109 mmol/L    Carbon Dioxide 27 20 - 32 mmol/L    Anion Gap 8 3 - 14 mmol/L    Glucose 224 (H) 70 - 99 mg/dL    Urea Nitrogen 27 7 - 30 mg/dL    Creatinine 1.30 (H) 0.52 - 1.04 mg/dL    GFR Estimate 40 (L) >60 mL/min/1.7m2    GFR Estimate If Black 49 (L) >60 mL/min/1.7m2     Calcium 9.0 8.5 - 10.1 mg/dL    Bilirubin Total 0.2 0.2 - 1.3 mg/dL    Albumin 3.8 3.4 - 5.0 g/dL    Protein Total 7.9 6.8 - 8.8 g/dL    Alkaline Phosphatase 108 40 - 150 U/L    ALT 27 0 - 50 U/L    AST 18 0 - 45 U/L   Magnesium   Result Value Ref Range    Magnesium 1.6 1.6 - 2.3 mg/dL   D-Dimer ([facility])   Result Value Ref Range    D-Dimer ng/mL 204 0 - 300 ng/ml D-DU   Nt probnp inpatient (BNP)   Result Value Ref Range    N-Terminal Pro BNP Inpatient 56 0 - 900 pg/mL   Lactic acid   Result Value Ref Range    Lactic Acid 1.2 0.4 - 2.0 mmol/L   CK total   Result Value Ref Range    CK Total 83 30 - 225 U/L   XR Chest 2 Views    Narrative    Procedure:XR CHEST 2 VW    Clinical history:Female, 72 years, Chest pain;     Technique: 2 views are submitted.    Comparison: None    Findings: The cardiac silhouette is normal. The pulmonary vasculature  is normal.    The lungs demonstrate linear atelectasis at the left lung base. Bony  structures demonstrate no evidence of fracture. There is no  pneumothorax.      Impression    Impression:  1.   Left basilar atelectasis. Cannot completely exclude a subtle left  basilar pneumonia.    CHRISTOPHER RAMSAY MD   Troponin I (second draw)   Result Value Ref Range    Troponin I ES <0.015 0.000 - 0.045 ug/L       Medications   aspirin chewable tablet 324 mg (324 mg Oral Given 4/22/18 0607)   ondansetron (ZOFRAN-ODT) ODT tab 4 mg (4 mg Oral Given 4/22/18 0607)       Assessments & Plan (with Medical Decision Making)   Atypical chest pain: Patient evaluated at the emergency department and all laboratory tests done were normal including normal CBC, CMP, troponin, d-dimer, BNP, lactic acid, CPK and magnesium.  Normal chest x-ray.  Patient's pain resolved while patient was in the emergency department.  Patient was given aspirin 324 mg orally.  She took 1 tablet of nitroglycerin prior to arrival at the ED.  In view of significant risk factors of hypercholesterolemia, hypertension and diabetes  patient was discharged home and advised follow-up with PCP tomorrow for scheduling of exercise stress test.  I have reviewed the nursing notes.    I have reviewed the findings, diagnosis, plan and need for follow up with the patient.    Discharge Medication List as of 4/22/2018  7:58 AM          Final diagnoses:   Atypical chest pain       4/22/2018   [facility]     Albert Muller MD  04/22/18 2012

## 2018-04-22 NOTE — ED NOTES
All discharge instructions and avs discussed with patient and her .  All questions answered.  Pt able to verbalize home care, follow up and medication management. Vitals stable.  Pt denies pain right now.  All belongings sent home

## 2018-04-22 NOTE — ED NOTES
"Pt ambulatory to room 3 with  accompanying. Pt states that she developed chest pain this morning, some time before 0500. Pt describes it as a \"squeezing\" pain across chest that radiates up bilateral neck and shoulders. Pt states that she has a prescription for nitro and she took 1. Nitro reduced her pain from a 6/10 to a 1/10. Pt into gown, IV established, cardiac and vitals monitors placed and call light in reach.  "

## 2018-04-22 NOTE — ED AVS SNAPSHOT
[facility]    [address] [address]    Phone:  [phone]                                       Cee Pereira   MRN: [id number]    Department:  [facility]   Date of Visit:  4/22/2018           After Visit Summary Signature Page     I have received my discharge instructions, and my questions have been answered. I have discussed any challenges I see with this plan with the nurse or doctor.    ..........................................................................................................................................  Patient/Patient Representative Signature      ..........................................................................................................................................  Patient Representative Print Name and Relationship to Patient    ..................................................               ................................................  Date                                            Time    ..........................................................................................................................................  Reviewed by Signature/Title    ...................................................              ..............................................  Date                                                            Time

## 2018-04-22 NOTE — ED NOTES
Face to face report given with opportunity to observe patient.    Report given to AYAKA De Leon   4/22/2018  7:10 AM

## 2018-05-01 ENCOUNTER — HOSPITAL ENCOUNTER (OUTPATIENT)
Dept: NUCLEAR MEDICINE | Facility: HOSPITAL | Age: 73
Setting detail: NUCLEAR MEDICINE
End: 2018-05-01
Attending: FAMILY MEDICINE
Payer: MEDICARE

## 2018-05-01 ENCOUNTER — HOSPITAL ENCOUNTER (OUTPATIENT)
Dept: GENERAL RADIOLOGY | Facility: HOSPITAL | Age: 73
Setting detail: NUCLEAR MEDICINE
End: 2018-05-01
Attending: FAMILY MEDICINE
Payer: MEDICARE

## 2018-05-01 ENCOUNTER — HOSPITAL ENCOUNTER (OUTPATIENT)
Dept: NUCLEAR MEDICINE | Facility: HOSPITAL | Age: 73
Setting detail: NUCLEAR MEDICINE
Discharge: HOME | End: 2018-05-01
Attending: FAMILY MEDICINE | Admitting: FAMILY MEDICINE
Payer: MEDICARE

## 2018-05-01 DIAGNOSIS — R07.89 ATYPICAL CHEST PAIN: ICD-10-CM

## 2018-05-01 PROCEDURE — 78452 HT MUSCLE IMAGE SPECT MULT: CPT | Mod: TC

## 2018-05-01 PROCEDURE — 25000128 H RX IP 250 OP 636: Performed by: INTERNAL MEDICINE

## 2018-05-01 PROCEDURE — 34300033 ZZH RX 343: Performed by: RADIOLOGY

## 2018-05-01 PROCEDURE — 93016 CV STRESS TEST SUPVJ ONLY: CPT | Performed by: INTERNAL MEDICINE

## 2018-05-01 PROCEDURE — A9500 TC99M SESTAMIBI: HCPCS | Performed by: RADIOLOGY

## 2018-05-01 PROCEDURE — 93017 CV STRESS TEST TRACING ONLY: CPT

## 2018-05-01 PROCEDURE — 93018 CV STRESS TEST I&R ONLY: CPT | Performed by: INTERNAL MEDICINE

## 2018-05-01 RX ORDER — AMINOPHYLLINE 25 MG/ML
INJECTION, SOLUTION INTRAVENOUS
Status: DISCONTINUED
Start: 2018-05-01 | End: 2018-05-01 | Stop reason: WASHOUT

## 2018-05-01 RX ORDER — REGADENOSON 0.08 MG/ML
0.4 INJECTION, SOLUTION INTRAVENOUS ONCE
Status: COMPLETED | OUTPATIENT
Start: 2018-05-01 | End: 2018-05-01

## 2018-05-01 RX ADMIN — Medication 30 MILLICURIE: at 08:48

## 2018-05-01 RX ADMIN — REGADENOSON 0.4 MG: 0.08 INJECTION, SOLUTION INTRAVENOUS at 08:47

## 2018-05-01 RX ADMIN — Medication 10 MILLICURIE: at 06:43

## 2018-07-20 ENCOUNTER — HOSPITAL ENCOUNTER (OUTPATIENT)
Dept: MRI IMAGING | Facility: HOSPITAL | Age: 73
Discharge: HOME | End: 2018-07-20
Attending: FAMILY MEDICINE | Admitting: FAMILY MEDICINE
Payer: MEDICARE

## 2018-07-20 DIAGNOSIS — M54.50 LOW BACK PAIN AT MULTIPLE SITES: ICD-10-CM

## 2018-07-20 PROCEDURE — 72148 MRI LUMBAR SPINE W/O DYE: CPT | Mod: TC

## 2018-09-17 ENCOUNTER — HOSPITAL ENCOUNTER (INPATIENT)
Facility: HOSPITAL | Age: 73
LOS: 1 days | Discharge: HOME | DRG: 390 | End: 2018-09-19
Attending: INTERNAL MEDICINE | Admitting: INTERNAL MEDICINE
Payer: MEDICARE

## 2018-09-17 ENCOUNTER — APPOINTMENT (OUTPATIENT)
Dept: GENERAL RADIOLOGY | Facility: HOSPITAL | Age: 73
DRG: 390 | End: 2018-09-17
Attending: INTERNAL MEDICINE
Payer: MEDICARE

## 2018-09-17 DIAGNOSIS — R10.12 LUQ ABDOMINAL PAIN: Primary | ICD-10-CM

## 2018-09-17 LAB
ALBUMIN SERPL-MCNC: 3.7 G/DL (ref 3.4–5)
ALP SERPL-CCNC: 85 U/L (ref 40–150)
ALT SERPL W P-5'-P-CCNC: 29 U/L (ref 0–50)
AMYLASE SERPL-CCNC: 75 U/L (ref 30–110)
ANION GAP SERPL CALCULATED.3IONS-SCNC: 7 MMOL/L (ref 3–14)
AST SERPL W P-5'-P-CCNC: 20 U/L (ref 0–45)
BASOPHILS # BLD AUTO: 0 10E9/L (ref 0–0.2)
BASOPHILS NFR BLD AUTO: 0.3 %
BILIRUB SERPL-MCNC: 0.3 MG/DL (ref 0.2–1.3)
BUN SERPL-MCNC: 19 MG/DL (ref 7–30)
CALCIUM SERPL-MCNC: 8.8 MG/DL (ref 8.5–10.1)
CHLORIDE SERPL-SCNC: 105 MMOL/L (ref 94–109)
CO2 SERPL-SCNC: 28 MMOL/L (ref 20–32)
CREAT SERPL-MCNC: 1.53 MG/DL (ref 0.52–1.04)
CRP SERPL-MCNC: <2.9 MG/L (ref 0–8)
DIFFERENTIAL METHOD BLD: ABNORMAL
EOSINOPHIL # BLD AUTO: 0.2 10E9/L (ref 0–0.7)
EOSINOPHIL NFR BLD AUTO: 1.4 %
ERYTHROCYTE [DISTWIDTH] IN BLOOD BY AUTOMATED COUNT: 14 % (ref 10–15)
GFR SERPL CREATININE-BSD FRML MDRD: 33 ML/MIN/1.7M2
GLUCOSE SERPL-MCNC: 154 MG/DL (ref 70–99)
HCT VFR BLD AUTO: 43.1 % (ref 35–47)
HGB BLD-MCNC: 14.4 G/DL (ref 11.7–15.7)
IMM GRANULOCYTES # BLD: 0 10E9/L (ref 0–0.4)
IMM GRANULOCYTES NFR BLD: 0.3 %
LIPASE SERPL-CCNC: 166 U/L (ref 73–393)
LYMPHOCYTES # BLD AUTO: 3.5 10E9/L (ref 0.8–5.3)
LYMPHOCYTES NFR BLD AUTO: 29.5 %
MCH RBC QN AUTO: 28 PG (ref 26.5–33)
MCHC RBC AUTO-ENTMCNC: 33.4 G/DL (ref 31.5–36.5)
MCV RBC AUTO: 84 FL (ref 78–100)
MONOCYTES # BLD AUTO: 0.8 10E9/L (ref 0–1.3)
MONOCYTES NFR BLD AUTO: 7 %
NEUTROPHILS # BLD AUTO: 7.3 10E9/L (ref 1.6–8.3)
NEUTROPHILS NFR BLD AUTO: 61.5 %
NRBC # BLD AUTO: 0 10*3/UL
NRBC BLD AUTO-RTO: 0 /100
PLATELET # BLD AUTO: 108 10E9/L (ref 150–450)
POTASSIUM SERPL-SCNC: 3.7 MMOL/L (ref 3.4–5.3)
PROT SERPL-MCNC: 7.9 G/DL (ref 6.8–8.8)
RBC # BLD AUTO: 5.15 10E12/L (ref 3.8–5.2)
SODIUM SERPL-SCNC: 140 MMOL/L (ref 133–144)
WBC # BLD AUTO: 11.9 10E9/L (ref 4–11)

## 2018-09-17 PROCEDURE — 83690 ASSAY OF LIPASE: CPT | Performed by: INTERNAL MEDICINE

## 2018-09-17 PROCEDURE — 74019 RADEX ABDOMEN 2 VIEWS: CPT | Mod: TC

## 2018-09-17 PROCEDURE — 86140 C-REACTIVE PROTEIN: CPT | Performed by: INTERNAL MEDICINE

## 2018-09-17 PROCEDURE — 96376 TX/PRO/DX INJ SAME DRUG ADON: CPT

## 2018-09-17 PROCEDURE — 82150 ASSAY OF AMYLASE: CPT | Performed by: INTERNAL MEDICINE

## 2018-09-17 PROCEDURE — 85025 COMPLETE CBC W/AUTO DIFF WBC: CPT | Performed by: INTERNAL MEDICINE

## 2018-09-17 PROCEDURE — 25000125 ZZHC RX 250: Performed by: INTERNAL MEDICINE

## 2018-09-17 PROCEDURE — 25000128 H RX IP 250 OP 636: Performed by: INTERNAL MEDICINE

## 2018-09-17 PROCEDURE — 96374 THER/PROPH/DIAG INJ IV PUSH: CPT

## 2018-09-17 PROCEDURE — 99285 EMERGENCY DEPT VISIT HI MDM: CPT | Performed by: INTERNAL MEDICINE

## 2018-09-17 PROCEDURE — 99285 EMERGENCY DEPT VISIT HI MDM: CPT | Mod: 25

## 2018-09-17 PROCEDURE — 80053 COMPREHEN METABOLIC PANEL: CPT | Performed by: INTERNAL MEDICINE

## 2018-09-17 PROCEDURE — 96375 TX/PRO/DX INJ NEW DRUG ADDON: CPT

## 2018-09-17 PROCEDURE — 96361 HYDRATE IV INFUSION ADD-ON: CPT

## 2018-09-17 RX ORDER — ONDANSETRON 2 MG/ML
4 INJECTION INTRAMUSCULAR; INTRAVENOUS ONCE
Status: COMPLETED | OUTPATIENT
Start: 2018-09-17 | End: 2018-09-17

## 2018-09-17 RX ORDER — MORPHINE SULFATE 4 MG/ML
4 INJECTION, SOLUTION INTRAMUSCULAR; INTRAVENOUS
Status: DISCONTINUED | OUTPATIENT
Start: 2018-09-17 | End: 2018-09-18

## 2018-09-17 RX ADMIN — ONDANSETRON 4 MG: 2 INJECTION, SOLUTION INTRAMUSCULAR; INTRAVENOUS at 22:14

## 2018-09-17 RX ADMIN — FAMOTIDINE 20 MG: 10 INJECTION, SOLUTION INTRAVENOUS at 22:16

## 2018-09-17 RX ADMIN — SODIUM CHLORIDE 1000 ML: 9 INJECTION, SOLUTION INTRAVENOUS at 22:11

## 2018-09-17 RX ADMIN — MORPHINE SULFATE 4 MG: 4 INJECTION INTRAVENOUS at 23:57

## 2018-09-17 RX ADMIN — MORPHINE SULFATE 4 MG: 4 INJECTION INTRAVENOUS at 22:20

## 2018-09-17 NOTE — IP AVS SNAPSHOT
[facility]    [address] [address]    Phone:  [phone]    Fax:  [phone]                                       After Visit Summary   9/17/2018    Cee Pereira    MRN: [id number]           After Visit Summary Signature Page     I have received my discharge instructions, and my questions have been answered. I have discussed any challenges I see with this plan with the nurse or doctor.    ..........................................................................................................................................  Patient/Patient Representative Signature      ..........................................................................................................................................  Patient Representative Print Name and Relationship to Patient    ..................................................               ................................................  Date                                   Time    ..........................................................................................................................................  Reviewed by Signature/Title    ...................................................              ..............................................  Date                                               Time          22EPIC Rev 08/18

## 2018-09-17 NOTE — IP AVS SNAPSHOT
MRN:[id number]                      After Visit Summary   9/17/2018    Cee Pereira    MRN: [id number]           Thank you!     Thank you for choosing [facility] for your care. Our goal is always to provide you with excellent care. Hearing back from our patients is one way we can continue to improve our services. Please take a few minutes to complete the written survey that you may receive in the mail after you visit with us. Thank you!        Patient Information     Date Of Birth          1945        Designated Caregiver       Most Recent Value    Caregiver    Will someone help with your care after discharge? yes    Name of designated caregiver     Phone number of caregiver see file    Caregiver address see file      About your hospital stay     You were admitted on:  September 18, 2018 You last received care in the:  [facility]    You were discharged on:  September 19, 2018       Who to Call     For medical emergencies, please call 911.  For non-urgent questions about your medical care, please call your primary care provider or clinic, [phone]          Attending Provider     Provider Specialty    Denzel Hernandez MD Emergency Medicine    Myron Salvador,  Internal Medicine    Jazmine Jasso, NP Nurse Practitioner    Walter Collins MD Internal Medicine       Primary Care Provider Office Phone # Fax #    Matthew Villela [phone]      After Care Instructions     Activity       Your activity upon discharge: activity as tolerated            Diet       Follow this diet upon discharge: Orders Placed This Encounter      Consistent Carbohydrate Diet 4050-8187 Calories: Moderate Consistent CHO (4-6 CHO units/meal)                  Follow-up Appointments     Follow-up and recommended labs and tests        Follow up with primary care provider, Matthew Villela, within 7-10 days for hospital follow- up.  No follow up labs or test are needed.                   Further instructions from your care team         Small Bowel Obstruction     Small bowel obstruction can lead to tissue damage and even tissue death.   A small bowel obstruction occurs when part or all of the small intestine (bowel) is blocked. As a result, digestive contents can t move through the bowel properly and out of the body. Treatment is needed right away to remove the blockage. This can ease painful symptoms. It can also prevent serious problems, such as tissue death or bursting (rupture) of the small bowel. Without treatment, a small bowel obstruction can be fatal.  Causes of small bowel obstruction  A small bowel obstruction can be caused by:    Scar tissue (adhesions). These may form after belly (abdominal) surgery or an infection.    Hernia. A hernia is when an organ pushes through a weak spot or tear in the abdomen wall. Part of the small bowel can push out and be seen as a bulge under the belly. Hernias can also occur internally.    Certain health problems. These include when part of the bowel slides inside another part (intussusception). Other causes include irritable bowel disease such as Crohn s disease, and inflammation and sores in the intestine (ulcerative colitis).    Abnormal tissue growths (tumors). These can form on the inside or outside of the small bowel. They are usually due to cancer.  Symptoms of small bowel obstruction  Common symptoms include:    Belly cramping and pain    Belly swelling and bloating    Upset stomach (nausea) and vomiting    Can't  pass gas    Can't pass stool (constipation)    Diarrhea  Diagnosing small bowel obstruction  Your provider will ask about your symptoms and health history. You ll also have a physical exam. Tests may also be done to confirm the problem. These can include:    Imaging tests. These provide pictures of the small bowel. Common tests include X-rays and a CT scan.    Blood tests. These check for infection and other problems, such as  excess fluid loss (dehydration).    Upper GI (gastrointestinal) series with a small bowel follow-through. This test takes X-rays of the upper digestive tract from the mouth through the small bowel. An X-ray dye (contrast fluid) is used. The dye coats the inside of your upper digestive tract so it will show up clearly on X-rays.  Treating small bowel obstruction  Treatment takes place in a hospital. As part of your care, the following may be done:    No food or drink is given by mouth. This allows your bowels to rest.    An IV (intravenous) line is placed in a vein in your arm or hand. The IV line is used to give fluids. It may also be used to give medicines. These may be needed to ease pain, nausea, and other symptoms. They may also be needed to treat or prevent infections.    A soft, thin, flexible tube (nasogastric tube) is inserted through your nose and into your stomach. The tube is used to remove extra gas and fluid in your stomach and bowels. This helps to ease symptoms such as pain and swelling.    In severe cases, surgery is done. This may be needed if the small bowel is almost or totally blocked, or there is a hole in the bowel (bowel perforation). During surgery, the blockage is removed. Parts of the bowel may also be removed if there is tissue death. Other repair may be done as well, depending on what caused the blockage. Your healthcare provider will give you more information about surgery, if needed.    You ll be watched closely in the hospital until your symptoms improve. Your provider will tell you when you can go home.  Long-term concerns   After treatment, most people recover with no lasting effects. If a long part of the bowel is removed, there is a greater chance for lifelong digestive problems. Bowel movements may become irregular. Work with your provider to learn the best ways to manage any symptoms you may have, and to protect your health.  When to call your healthcare provider  Call your  "provider right away if you have any of the following:    Severe pain (call 911)    Belly swelling or cramping that won t go away    Can t pass stool or gas    Nausea or vomiting (especially if the vomit looks or smells like stool)   Date Last Reviewed: 7/1/2016 2000-2017 [employer]. [address] [place] [address], [place] [address]. All rights reserved. This information is not intended as a substitute for professional medical care. Always follow your healthcare professional's instructions.          Pending Results     Date and Time Order Name Status Description    9/18/2018 0546 Estimated Average Glucose In process             Statement of Approval     Ordered          09/19/18 1242  I have reviewed and agree with all the recommendations and orders detailed in this document.  EFFECTIVE NOW     Comments:  May discharge if tolerates advancing diet and no issues with nausea, vomiting or abdominal pain   Approved and electronically signed by:  Walter Collins MD             Admission Information     Date & Time Provider Department Dept. Phone    9/17/2018 Walter Collins MD [facility] [phone]      Your Vitals Were     Blood Pressure Pulse Temperature Respirations Height Weight    149/79 72 98  F (36.7  C) (Temporal) 16 1.676 m (5' 6\") 79.9 kg (176 lb 2.4 oz)    Pulse Oximetry BMI (Body Mass Index)                95% 28.43 kg/m2          Care EveryWhere ID     This is your Care EveryWhere ID. This could be used by other organizations to access your [facility] medical records  [id number]        Equal Access to Services     TIARA GOVEA AH: Hadii martin kento Sovianey, waaxda luqadaha, qaybta kaalmada adeegyada, miguel angel siddiqui. So [facility] [phone].    ATENCIÓN: Si habla español, tiene a garcia disposición servicios gratuitos de asistencia lingüística. Llame al [phone].    We comply with applicable federal civil rights laws and [place] laws. We do not discriminate on " the basis of race, color, national origin, age, disability, sex, sexual orientation, or gender identity.               Review of your medicines      CONTINUE these medicines which have NOT CHANGED        Dose / Directions    aspirin-dipyridamole  MG per 12 hr capsule   Commonly known as:  AGGRENOX        Dose:  1 capsule   Take 1 capsule by mouth daily   Refills:  0       CELEBREX PO        Dose:  200 mg   Take 200 mg by mouth 2 times daily   Refills:  0       COQ-10 PO        Dose:  100 mg   Take 100 mg by mouth daily   Refills:  0       * GABAPENTIN PO        Dose:  300 mg   Take 300 mg by mouth 2 times daily AM, second dose at 1200   Refills:  0       * GABAPENTIN PO        Dose:  600 mg   Take 600 mg by mouth At Bedtime   Refills:  0       METOPROLOL TARTRATE PO        Dose:  25 mg   Take 25 mg by mouth daily   Refills:  0       PANTOPRAZOLE SODIUM PO        Dose:  40 mg   Take 40 mg by mouth every morning (before breakfast)   Refills:  0       SIMVASTATIN PO        Dose:  10 mg   Take 10 mg by mouth daily   Refills:  0       TRESIBA FLEXTOUCH 100 UNIT/ML pen   Generic drug:  insulin degludec        Dose:  24 Units   Inject 24 Units Subcutaneous At Bedtime   Refills:  0       * Notice:  This list has 2 medication(s) that are the same as other medications prescribed for you. Read the directions carefully, and ask your doctor or other care provider to review them with you.             Protect others around you: Learn how to safely use, store and throw away your medicines at www.disposemymeds.org.             Medication List: This is a list of all your medications and when to take them. Check marks below indicate your daily home schedule. Keep this list as a reference.      Medications           Morning Afternoon Evening Bedtime As Needed    aspirin-dipyridamole  MG per 12 hr capsule   Commonly known as:  AGGRENOX   Take 1 capsule by mouth daily   Last time this was given:  1 capsule on 9/19/2018 10:32  AM                                CELEBREX PO   Take 200 mg by mouth 2 times daily                                COQ-10 PO   Take 100 mg by mouth daily                                * GABAPENTIN PO   Take 300 mg by mouth 2 times daily AM, second dose at 1200   Last time this was given:  300 mg on 9/19/2018 11:43 AM                                * GABAPENTIN PO   Take 600 mg by mouth At Bedtime   Last time this was given:  300 mg on 9/19/2018 11:43 AM                                METOPROLOL TARTRATE PO   Take 25 mg by mouth daily   Last time this was given:  25 mg on 9/19/2018  9:23 AM                                PANTOPRAZOLE SODIUM PO   Take 40 mg by mouth every morning (before breakfast)   Last time this was given:  40 mg on 9/18/2018  9:14 AM                                SIMVASTATIN PO   Take 10 mg by mouth daily   Last time this was given:  10 mg on 9/18/2018  8:45 PM                                TRESIBA FLEXTOUCH 100 UNIT/ML pen   Inject 24 Units Subcutaneous At Bedtime   Generic drug:  insulin degludec                                * Notice:  This list has 2 medication(s) that are the same as other medications prescribed for you. Read the directions carefully, and ask your doctor or other care provider to review them with you.              More Information        Small Bowel Obstruction     Small bowel obstruction can lead to tissue damage and even tissue death.   A small bowel obstruction occurs when part or all of the small intestine (bowel) is blocked. As a result, digestive contents can t move through the bowel properly and out of the body. Treatment is needed right away to remove the blockage. This can ease painful symptoms. It can also prevent serious problems, such as tissue death or bursting (rupture) of the small bowel. Without treatment, a small bowel obstruction can be fatal.  Causes of small bowel obstruction  A small bowel obstruction can be caused by:    Scar tissue (adhesions). These  may form after belly (abdominal) surgery or an infection.    Hernia. A hernia is when an organ pushes through a weak spot or tear in the abdomen wall. Part of the small bowel can push out and be seen as a bulge under the belly. Hernias can also occur internally.    Certain health problems. These include when part of the bowel slides inside another part (intussusception). Other causes include irritable bowel disease such as Crohn s disease, and inflammation and sores in the intestine (ulcerative colitis).    Abnormal tissue growths (tumors). These can form on the inside or outside of the small bowel. They are usually due to cancer.  Symptoms of small bowel obstruction  Common symptoms include:    Belly cramping and pain    Belly swelling and bloating    Upset stomach (nausea) and vomiting    Can't  pass gas    Can't pass stool (constipation)    Diarrhea  Diagnosing small bowel obstruction  Your provider will ask about your symptoms and health history. You ll also have a physical exam. Tests may also be done to confirm the problem. These can include:    Imaging tests. These provide pictures of the small bowel. Common tests include X-rays and a CT scan.    Blood tests. These check for infection and other problems, such as excess fluid loss (dehydration).    Upper GI (gastrointestinal) series with a small bowel follow-through. This test takes X-rays of the upper digestive tract from the mouth through the small bowel. An X-ray dye (contrast fluid) is used. The dye coats the inside of your upper digestive tract so it will show up clearly on X-rays.  Treating small bowel obstruction  Treatment takes place in a hospital. As part of your care, the following may be done:    No food or drink is given by mouth. This allows your bowels to rest.    An IV (intravenous) line is placed in a vein in your arm or hand. The IV line is used to give fluids. It may also be used to give medicines. These may be needed to ease pain, nausea,  and other symptoms. They may also be needed to treat or prevent infections.    A soft, thin, flexible tube (nasogastric tube) is inserted through your nose and into your stomach. The tube is used to remove extra gas and fluid in your stomach and bowels. This helps to ease symptoms such as pain and swelling.    In severe cases, surgery is done. This may be needed if the small bowel is almost or totally blocked, or there is a hole in the bowel (bowel perforation). During surgery, the blockage is removed. Parts of the bowel may also be removed if there is tissue death. Other repair may be done as well, depending on what caused the blockage. Your healthcare provider will give you more information about surgery, if needed.    You ll be watched closely in the hospital until your symptoms improve. Your provider will tell you when you can go home.  Long-term concerns   After treatment, most people recover with no lasting effects. If a long part of the bowel is removed, there is a greater chance for lifelong digestive problems. Bowel movements may become irregular. Work with your provider to learn the best ways to manage any symptoms you may have, and to protect your health.  When to call your healthcare provider  Call your provider right away if you have any of the following:    Severe pain (call 911)    Belly swelling or cramping that won t go away    Can t pass stool or gas    Nausea or vomiting (especially if the vomit looks or smells like stool)   Date Last Reviewed: 7/1/2016 2000-2017 [employer]. [address] [place] [address]. All rights reserved. This information is not intended as a substitute for professional medical care. Always follow your healthcare professional's instructions.                Managing Type 2 Diabetes    Type 2 diabetes is a long-term (chronic) condition. Managing your diabetes means making some changes that may be hard. Your healthcare provider, nurse, diabetes  educator, and others can help you.  Managing type 2 diabetes means balancing your medicine with diet and activity. Managing your diabetes may also include checking your blood sugar. And, working with your healthcare provider to prevent complications.  Take your medicine as prescribed  You may take pills (oral medicine) or give yourself shots (usually insulin injections) for diabetes. Or you may use both. Taking your medicines or giving yourself insulin at the right times will help you control your blood sugar. Think about ways that will help you remember to take your medicines the right way every day. Ask your healthcare provider, nurse, or diabetes educator for ideas.  Although you may only take pills for your diabetes, this may change. Over time, most people with type 2 diabetes also use insulin.  Eat healthy  A healthy, well-planned diet helps control the amount of sugar in your blood. It also helps you stay at a healthy weight or lose weight, if you are overweight. Extra weight makes it harder to control diabetes.  Your healthcare provider, nurse, a dietitian, or diabetes educator will help you create a plan that works for you. You don't have to give up all the foods you like. Having meals and snacks with vegetables, fruits, lean meats, or other healthy proteins, whole grains, and low-fat or nonfat dairy products will help control your blood sugar.  Be physically active  Being active helps lower your blood sugar. It does this by helping your body use insulin to turn food into energy. Activity also helps you manage your weight:  Ask your health care provider to work with you to create an activity program that's right for you. Your activity program is based on your age, general health, and types of activity you enjoy. You should start slowly, but aim for at least 150 minutes of exercise or activity. Don t let more than 2 days go by without being active.  Check your blood sugar  Checking your own blood sugar may be  a regular part of your care. Or you may only check your blood sugar from time to time. Your healthcare provider will give you instructions about checking your blood sugar at home. Checking it tells you if your blood sugar is in your target range. Having your blood sugars within the target range means that you are managing your diabetes well.  If your blood sugar levels are too high or too low, your healthcare provider may suggest changes to your diet or activity level. He or she may also adjust your medicine.  Your healthcare provider may also tell you to check your blood sugar more often when you are sick. At certain times, for example, when you have a cold or the flu, you may need to check it more often.  Take care of yourself  When you have diabetes, you may be more likely to develop other health problems. They include foot, eye, heart, and kidney problems. By controlling your blood sugar, and taking good care of yourself, you can help prevent these problems. Your health care provider, nurse, diabetes educator, and others can assist you:    Checkups. You should have regular checkups with your healthcare provider. At those visits, you will have a physical exam that includes checking your feet. Your healthcare provider will also check your blood pressure and weight.    Other exams. You should also have complete eye, foot, and dental exams at least once every year.    Lab tests. You will have blood and urine tests:  ? At least two times a year, your healthcare provider will check your hemoglobin A1C. This blood test shows how well you have been controlling your blood sugar over 2 to 3 months. The results help your healthcare provider manage your diabetes.    ? You will also have other lab tests. For example, to check for kidney problems and abnormal cholesterol levels.    Smoking. If you smoke, you will need to quit. Smoking increases the chance that you will develop complications from diabetes. Ask your healthcare  provider about ways to quit.    Vaccines. Get a yearly flu shot. And, ask your healthcare provider about vaccines to prevent pneumonia, shingles, and hepatitis B.  Stress and depression  Most people have challenges throughout their lives. Living with diabetes, or any serious condition, can increase your stress and make you feel a lot of different emotions. In diabetes, feeling stressed or depressed can actually affect your blood sugar levels.  If you are having trouble dealing with diabetes, tell your healthcare provider. He or she can help or refer you to other healthcare providers or programs.  Support and resources  Know where you can get help. You can try the following:    Support. Ask family and friends to support your effects to take care of yourself. Or, look for a diabetes support group locally or on the internet. (Check the Connect with Others link on www.diabetes.org)    Counseling. Talk with a , psychologist, psychiatrist, or other counselor.    Information. Contact the American Diabetes Association at 154-700-4614 or www.diabetes.org  Date Last Reviewed: 6/1/2016 2000-2017 [employer]. [address] [place] [address], [place] [address]. All rights reserved. This information is not intended as a substitute for professional medical care. Always follow your healthcare professional's instructions.

## 2018-09-17 NOTE — ED AVS SNAPSHOT
[facility]    [address] [address]    Phone:  [phone]                                       Cee Pereira   MRN: [id number]    Department:  [facility]   Date of Visit:  9/17/2018           After Visit Summary Signature Page     I have received my discharge instructions, and my questions have been answered. I have discussed any challenges I see with this plan with the nurse or doctor.    ..........................................................................................................................................  Patient/Patient Representative Signature      ..........................................................................................................................................  Patient Representative Print Name and Relationship to Patient    ..................................................               ................................................  Date                                   Time    ..........................................................................................................................................  Reviewed by Signature/Title    ...................................................              ..............................................  Date                                               Time          22EPIC Rev 08/18

## 2018-09-17 NOTE — ED AVS SNAPSHOT
[facility]    [address] [address]    Phone:  [phone]                                       Cee Pereira   MRN: [id number]    Department:  [facility]   Date of Visit:  9/17/2018           Patient Information     Date Of Birth          1945        Your diagnoses for this visit were:     Partial small bowel obstruction        You were seen by Denzel Hernandez MD.      Follow-up Information     Schedule an appointment as soon as possible for a visit with Matthew Villela DO.    Specialty:  Family Practice    Contact information:    [facility]  [address]  [facility] [address]  [phone]          Follow up with [facility].    Specialty:  EMERGENCY MEDICINE    Why:  If symptoms worsen    Contact information:    [address] [address] [phone]    Additional information:    From [facility]: Take [address]. Turn left at [address]. Turn left at the first stoplight on [address]. At the first stop sign, take a right onto [address]. Take a left into the parking lot and continue through until you reach the North enterance of the building.       From Avenue: Take [address]. Take the [address] ramp towards [place]. Turn left onto [address]. Take a slight right onto [address]. Turn left at the first stoplight on [address]. At the first stop sign, take a right onto [address]. Take a left into the parking lot and continue through until you reach the North enterance of the building.       From [place]: Take [address]. Take a right at [address]. At the first stop sign, take a right onto [address]. Take a left into the parking lot and continue through until you reach the North enterance of the building.         Discharge Instructions         Small Bowel Obstruction     Small bowel obstruction can lead to tissue  damage and even tissue death.   A small bowel obstruction occurs when part or all of the small intestine (bowel) is blocked. As a result, digestive contents can t move through the bowel properly and out of the body. Treatment is needed right away to remove the blockage. This can ease painful symptoms. It can also prevent serious problems, such as tissue death or bursting (rupture) of the small bowel. Without treatment, a small bowel obstruction can be fatal.  Causes of small bowel obstruction  A small bowel obstruction can be caused by:    Scar tissue (adhesions). These may form after belly (abdominal) surgery or an infection.    Hernia. A hernia is when an organ pushes through a weak spot or tear in the abdomen wall. Part of the small bowel can push out and be seen as a bulge under the belly. Hernias can also occur internally.    Certain health problems. These include when part of the bowel slides inside another part (intussusception). Other causes include irritable bowel disease such as Crohn s disease, and inflammation and sores in the intestine (ulcerative colitis).    Abnormal tissue growths (tumors). These can form on the inside or outside of the small bowel. They are usually due to cancer.  Symptoms of small bowel obstruction  Common symptoms include:    Belly cramping and pain    Belly swelling and bloating    Upset stomach (nausea) and vomiting    Can't  pass gas    Can't pass stool (constipation)    Diarrhea  Diagnosing small bowel obstruction  Your provider will ask about your symptoms and health history. You ll also have a physical exam. Tests may also be done to confirm the problem. These can include:    Imaging tests. These provide pictures of the small bowel. Common tests include X-rays and a CT scan.    Blood tests. These check for infection and other problems, such as excess fluid loss (dehydration).    Upper GI (gastrointestinal) series with a small bowel follow-through. This test takes X-rays of  the upper digestive tract from the mouth through the small bowel. An X-ray dye (contrast fluid) is used. The dye coats the inside of your upper digestive tract so it will show up clearly on X-rays.  Treating small bowel obstruction  Treatment takes place in a hospital. As part of your care, the following may be done:    No food or drink is given by mouth. This allows your bowels to rest.    An IV (intravenous) line is placed in a vein in your arm or hand. The IV line is used to give fluids. It may also be used to give medicines. These may be needed to ease pain, nausea, and other symptoms. They may also be needed to treat or prevent infections.    A soft, thin, flexible tube (nasogastric tube) is inserted through your nose and into your stomach. The tube is used to remove extra gas and fluid in your stomach and bowels. This helps to ease symptoms such as pain and swelling.    In severe cases, surgery is done. This may be needed if the small bowel is almost or totally blocked, or there is a hole in the bowel (bowel perforation). During surgery, the blockage is removed. Parts of the bowel may also be removed if there is tissue death. Other repair may be done as well, depending on what caused the blockage. Your healthcare provider will give you more information about surgery, if needed.    You ll be watched closely in the hospital until your symptoms improve. Your provider will tell you when you can go home.  Long-term concerns   After treatment, most people recover with no lasting effects. If a long part of the bowel is removed, there is a greater chance for lifelong digestive problems. Bowel movements may become irregular. Work with your provider to learn the best ways to manage any symptoms you may have, and to protect your health.  When to call your healthcare provider  Call your provider right away if you have any of the following:    Severe pain (call 911)    Belly swelling or cramping that won t go away    Can t  pass stool or gas    Nausea or vomiting (especially if the vomit looks or smells like stool)   Date Last Reviewed: 7/1/2016 2000-2017 [employer]. [address] [place] [address], [place] [address]. All rights reserved. This information is not intended as a substitute for professional medical care. Always follow your healthcare professional's instructions.             Review of your medicines      Our records show that you are taking the medicines listed below. If these are incorrect, please call your family doctor or clinic.        Dose / Directions Last dose taken    aspirin-dipyridamole  MG per 12 hr capsule   Commonly known as:  AGGRENOX   Dose:  1 capsule        Take 1 capsule by mouth daily   Refills:  0        CELEBREX PO   Dose:  200 mg        Take 200 mg by mouth 2 times daily   Refills:  0        COQ-10 PO   Dose:  100 mg        Take 100 mg by mouth daily   Refills:  0        * GABAPENTIN PO   Dose:  300 mg        Take 300 mg by mouth 2 times daily AM, second dose at 1200   Refills:  0        * GABAPENTIN PO   Dose:  600 mg        Take 600 mg by mouth At Bedtime   Refills:  0        METOPROLOL TARTRATE PO   Dose:  25 mg        Take 25 mg by mouth daily   Refills:  0        PANTOPRAZOLE SODIUM PO   Dose:  40 mg        Take 40 mg by mouth every morning (before breakfast)   Refills:  0        SIMVASTATIN PO   Dose:  10 mg        Take 10 mg by mouth daily   Refills:  0        TRESIBA FLEXTOUCH 100 UNIT/ML pen   Dose:  24 Units   Generic drug:  insulin degludec        Inject 24 Units Subcutaneous At Bedtime   Refills:  0        * Notice:  This list has 2 medication(s) that are the same as other medications prescribed for you. Read the directions carefully, and ask your doctor or other care provider to review them with you.            Procedures and tests performed during your visit     Amylase    CBC with platelets differential    CRP inflammation    Comprehensive metabolic panel    Lipase    XR  Abdomen 2 Views      Orders Needing Specimen Collection     Ordered          09/17/18 2156  UA with Microscopic reflex to Culture - STAT, [facility]: STAT, Status: Sent     Scheduled Task Status   09/17/18 2156 [facility] Reminder: Open   Order Class:  PCU Collect                  Pending Results     Date and Time Order Name Status Description    9/17/2018 2156 XR Abdomen 2 Views In process             Pending Culture Results     No orders found from 9/15/2018 to 9/18/2018.            Thank you for choosing [facility]       Thank you for choosing [facility] for your care. Our goal is always to provide you with excellent care. Hearing back from our patients is one way we can continue to improve our services. Please take a few minutes to complete the written survey that you may receive in the mail after you visit with us. Thank you!        Care EveryWhere ID     This is your Care EveryWhere ID. This could be used by other organizations to access your [facility] medical records  [id number]        Equal Access to Services     ESTELITA GOVEA : Kaveh Urrutia, jasmyn joseph, miguel angel alvarez . So [facility] [phone].    ATENCIÓN: Si habla español, tiene a garcia disposición servicios gratuitos de asistencia lingüística. Llame al [phone].    We comply with applicable federal civil rights laws and [place] laws. We do not discriminate on the basis of race, color, national origin, age, disability, sex, sexual orientation, or gender identity.            After Visit Summary       This is your record. Keep this with you and show to your community pharmacist(s) and doctor(s) at your next visit.

## 2018-09-18 ENCOUNTER — APPOINTMENT (OUTPATIENT)
Dept: GENERAL RADIOLOGY | Facility: HOSPITAL | Age: 73
DRG: 390 | End: 2018-09-18
Attending: NURSE PRACTITIONER
Payer: MEDICARE

## 2018-09-18 PROBLEM — R10.12 LUQ ABDOMINAL PAIN: Status: ACTIVE | Noted: 2018-09-18

## 2018-09-18 PROBLEM — E78.5 HYPERLIPIDEMIA: Status: ACTIVE | Noted: 2018-09-18

## 2018-09-18 LAB
ALBUMIN SERPL-MCNC: 3.5 G/DL (ref 3.4–5)
ALBUMIN UR-MCNC: 10 MG/DL
ALP SERPL-CCNC: 85 U/L (ref 40–150)
ALT SERPL W P-5'-P-CCNC: 42 U/L (ref 0–50)
ANION GAP SERPL CALCULATED.3IONS-SCNC: 9 MMOL/L (ref 3–14)
APPEARANCE UR: CLEAR
AST SERPL W P-5'-P-CCNC: 35 U/L (ref 0–45)
BACTERIA #/AREA URNS HPF: ABNORMAL /HPF
BASOPHILS # BLD AUTO: 0 10E9/L (ref 0–0.2)
BASOPHILS NFR BLD AUTO: 0.3 %
BILIRUB SERPL-MCNC: 0.3 MG/DL (ref 0.2–1.3)
BILIRUB UR QL STRIP: NEGATIVE
BUN SERPL-MCNC: 17 MG/DL (ref 7–30)
CALCIUM SERPL-MCNC: 8.6 MG/DL (ref 8.5–10.1)
CHLORIDE SERPL-SCNC: 108 MMOL/L (ref 94–109)
CO2 SERPL-SCNC: 24 MMOL/L (ref 20–32)
COLOR UR AUTO: YELLOW
CREAT SERPL-MCNC: 1.21 MG/DL (ref 0.52–1.04)
DIFFERENTIAL METHOD BLD: ABNORMAL
EOSINOPHIL # BLD AUTO: 0.1 10E9/L (ref 0–0.7)
EOSINOPHIL NFR BLD AUTO: 0.4 %
ERYTHROCYTE [DISTWIDTH] IN BLOOD BY AUTOMATED COUNT: 13.8 % (ref 10–15)
EST. AVERAGE GLUCOSE BLD GHB EST-MCNC: 163 MG/DL
GFR SERPL CREATININE-BSD FRML MDRD: 44 ML/MIN/1.7M2
GLUCOSE BLDC GLUCOMTR-MCNC: 126 MG/DL (ref 70–99)
GLUCOSE BLDC GLUCOMTR-MCNC: 87 MG/DL (ref 70–99)
GLUCOSE BLDC GLUCOMTR-MCNC: 95 MG/DL (ref 70–99)
GLUCOSE SERPL-MCNC: 180 MG/DL (ref 70–99)
GLUCOSE UR STRIP-MCNC: NEGATIVE MG/DL
HBA1C MFR BLD: 7.3 % (ref 0–5.6)
HCT VFR BLD AUTO: 43.7 % (ref 35–47)
HGB BLD-MCNC: 14.7 G/DL (ref 11.7–15.7)
HGB UR QL STRIP: NEGATIVE
IMM GRANULOCYTES # BLD: 0.1 10E9/L (ref 0–0.4)
IMM GRANULOCYTES NFR BLD: 0.3 %
KETONES UR STRIP-MCNC: NEGATIVE MG/DL
LACTATE SERPL-SCNC: 1.2 MMOL/L (ref 0.4–2)
LEUKOCYTE ESTERASE UR QL STRIP: ABNORMAL
LYMPHOCYTES # BLD AUTO: 1.4 10E9/L (ref 0.8–5.3)
LYMPHOCYTES NFR BLD AUTO: 9.1 %
MCH RBC QN AUTO: 28.2 PG (ref 26.5–33)
MCHC RBC AUTO-ENTMCNC: 33.6 G/DL (ref 31.5–36.5)
MCV RBC AUTO: 84 FL (ref 78–100)
MONOCYTES # BLD AUTO: 1 10E9/L (ref 0–1.3)
MONOCYTES NFR BLD AUTO: 6.5 %
MUCOUS THREADS #/AREA URNS LPF: PRESENT /LPF
NEUTROPHILS # BLD AUTO: 13.2 10E9/L (ref 1.6–8.3)
NEUTROPHILS NFR BLD AUTO: 83.4 %
NITRATE UR QL: NEGATIVE
NRBC # BLD AUTO: 0 10*3/UL
NRBC BLD AUTO-RTO: 0 /100
PH UR STRIP: 5 PH (ref 4.7–8)
PLATELET # BLD AUTO: 98 10E9/L (ref 150–450)
POTASSIUM SERPL-SCNC: 4.3 MMOL/L (ref 3.4–5.3)
PROT SERPL-MCNC: 7.4 G/DL (ref 6.8–8.8)
RBC # BLD AUTO: 5.22 10E12/L (ref 3.8–5.2)
RBC #/AREA URNS AUTO: <1 /HPF (ref 0–2)
SODIUM SERPL-SCNC: 141 MMOL/L (ref 133–144)
SOURCE: ABNORMAL
SP GR UR STRIP: 1.02 (ref 1–1.03)
TROPONIN I SERPL-MCNC: <0.015 UG/L (ref 0–0.04)
UROBILINOGEN UR STRIP-MCNC: NORMAL MG/DL (ref 0–2)
WBC # BLD AUTO: 15.8 10E9/L (ref 4–11)
WBC #/AREA URNS AUTO: 9 /HPF (ref 0–5)

## 2018-09-18 PROCEDURE — 25000132 ZZH RX MED GY IP 250 OP 250 PS 637: Mod: GY | Performed by: INTERNAL MEDICINE

## 2018-09-18 PROCEDURE — G0378 HOSPITAL OBSERVATION PER HR: HCPCS

## 2018-09-18 PROCEDURE — 99223 1ST HOSP IP/OBS HIGH 75: CPT | Mod: AI | Performed by: INTERNAL MEDICINE

## 2018-09-18 PROCEDURE — A9270 NON-COVERED ITEM OR SERVICE: HCPCS | Mod: GY | Performed by: NURSE PRACTITIONER

## 2018-09-18 PROCEDURE — C9113 INJ PANTOPRAZOLE SODIUM, VIA: HCPCS | Performed by: NURSE PRACTITIONER

## 2018-09-18 PROCEDURE — 83605 ASSAY OF LACTIC ACID: CPT | Performed by: NURSE PRACTITIONER

## 2018-09-18 PROCEDURE — 25000125 ZZHC RX 250: Performed by: NURSE PRACTITIONER

## 2018-09-18 PROCEDURE — A9270 NON-COVERED ITEM OR SERVICE: HCPCS | Mod: GY | Performed by: INTERNAL MEDICINE

## 2018-09-18 PROCEDURE — 93005 ELECTROCARDIOGRAM TRACING: CPT

## 2018-09-18 PROCEDURE — 85025 COMPLETE CBC W/AUTO DIFF WBC: CPT | Performed by: INTERNAL MEDICINE

## 2018-09-18 PROCEDURE — 84484 ASSAY OF TROPONIN QUANT: CPT | Performed by: NURSE PRACTITIONER

## 2018-09-18 PROCEDURE — 25000128 H RX IP 250 OP 636: Performed by: INTERNAL MEDICINE

## 2018-09-18 PROCEDURE — 40000788 ZZHCL STATISTIC ESTIMATED AVERAGE GLUCOSE: Performed by: INTERNAL MEDICINE

## 2018-09-18 PROCEDURE — 36415 COLL VENOUS BLD VENIPUNCTURE: CPT | Performed by: NURSE PRACTITIONER

## 2018-09-18 PROCEDURE — 36415 COLL VENOUS BLD VENIPUNCTURE: CPT | Performed by: INTERNAL MEDICINE

## 2018-09-18 PROCEDURE — 12000000 ZZH R&B MED SURG/OB

## 2018-09-18 PROCEDURE — 80053 COMPREHEN METABOLIC PANEL: CPT | Performed by: INTERNAL MEDICINE

## 2018-09-18 PROCEDURE — 25000132 ZZH RX MED GY IP 250 OP 250 PS 637: Mod: GY | Performed by: NURSE PRACTITIONER

## 2018-09-18 PROCEDURE — 87086 URINE CULTURE/COLONY COUNT: CPT | Performed by: FAMILY MEDICINE

## 2018-09-18 PROCEDURE — 81001 URINALYSIS AUTO W/SCOPE: CPT | Performed by: INTERNAL MEDICINE

## 2018-09-18 PROCEDURE — 00000146 ZZHCL STATISTIC GLUCOSE BY METER IP

## 2018-09-18 PROCEDURE — 93010 ELECTROCARDIOGRAM REPORT: CPT | Performed by: INTERNAL MEDICINE

## 2018-09-18 PROCEDURE — 83036 HEMOGLOBIN GLYCOSYLATED A1C: CPT | Performed by: INTERNAL MEDICINE

## 2018-09-18 PROCEDURE — 25000128 H RX IP 250 OP 636: Performed by: NURSE PRACTITIONER

## 2018-09-18 PROCEDURE — 74018 RADEX ABDOMEN 1 VIEW: CPT | Mod: TC

## 2018-09-18 RX ORDER — HYDROMORPHONE HYDROCHLORIDE 1 MG/ML
.3-.5 INJECTION, SOLUTION INTRAMUSCULAR; INTRAVENOUS; SUBCUTANEOUS
Status: DISCONTINUED | OUTPATIENT
Start: 2018-09-18 | End: 2018-09-19 | Stop reason: HOSPADM

## 2018-09-18 RX ORDER — METOPROLOL TARTRATE 25 MG/1
25 TABLET, FILM COATED ORAL DAILY
Status: DISCONTINUED | OUTPATIENT
Start: 2018-09-18 | End: 2018-09-19 | Stop reason: HOSPADM

## 2018-09-18 RX ORDER — MORPHINE SULFATE 2 MG/ML
2 INJECTION, SOLUTION INTRAMUSCULAR; INTRAVENOUS
Status: DISCONTINUED | OUTPATIENT
Start: 2018-09-18 | End: 2018-09-19 | Stop reason: HOSPADM

## 2018-09-18 RX ORDER — GABAPENTIN 300 MG/1
300 CAPSULE ORAL 2 TIMES DAILY
Status: DISCONTINUED | OUTPATIENT
Start: 2018-09-18 | End: 2018-09-19 | Stop reason: HOSPADM

## 2018-09-18 RX ORDER — SUCRALFATE ORAL 1 G/10ML
1 SUSPENSION ORAL
Status: DISCONTINUED | OUTPATIENT
Start: 2018-09-18 | End: 2018-09-19 | Stop reason: HOSPADM

## 2018-09-18 RX ORDER — ONDANSETRON 4 MG/1
4 TABLET, ORALLY DISINTEGRATING ORAL EVERY 6 HOURS PRN
Status: DISCONTINUED | OUTPATIENT
Start: 2018-09-18 | End: 2018-09-19 | Stop reason: HOSPADM

## 2018-09-18 RX ORDER — METOCLOPRAMIDE HYDROCHLORIDE 5 MG/ML
5 INJECTION INTRAMUSCULAR; INTRAVENOUS EVERY 6 HOURS PRN
Status: DISCONTINUED | OUTPATIENT
Start: 2018-09-18 | End: 2018-09-19 | Stop reason: HOSPADM

## 2018-09-18 RX ORDER — NALOXONE HYDROCHLORIDE 0.4 MG/ML
.1-.4 INJECTION, SOLUTION INTRAMUSCULAR; INTRAVENOUS; SUBCUTANEOUS
Status: DISCONTINUED | OUTPATIENT
Start: 2018-09-18 | End: 2018-09-19 | Stop reason: HOSPADM

## 2018-09-18 RX ORDER — ACETAMINOPHEN 650 MG/1
650 SUPPOSITORY RECTAL EVERY 4 HOURS PRN
Status: DISCONTINUED | OUTPATIENT
Start: 2018-09-18 | End: 2018-09-19 | Stop reason: HOSPADM

## 2018-09-18 RX ORDER — NICOTINE POLACRILEX 4 MG
15-30 LOZENGE BUCCAL
Status: DISCONTINUED | OUTPATIENT
Start: 2018-09-18 | End: 2018-09-19 | Stop reason: HOSPADM

## 2018-09-18 RX ORDER — ONDANSETRON 2 MG/ML
4 INJECTION INTRAMUSCULAR; INTRAVENOUS EVERY 6 HOURS PRN
Status: DISCONTINUED | OUTPATIENT
Start: 2018-09-18 | End: 2018-09-19 | Stop reason: HOSPADM

## 2018-09-18 RX ORDER — DEXTROSE MONOHYDRATE 25 G/50ML
25-50 INJECTION, SOLUTION INTRAVENOUS
Status: DISCONTINUED | OUTPATIENT
Start: 2018-09-18 | End: 2018-09-19 | Stop reason: HOSPADM

## 2018-09-18 RX ORDER — DOCUSATE SODIUM 100 MG/1
100 CAPSULE, LIQUID FILLED ORAL 2 TIMES DAILY
Status: DISCONTINUED | OUTPATIENT
Start: 2018-09-18 | End: 2018-09-19 | Stop reason: HOSPADM

## 2018-09-18 RX ORDER — GABAPENTIN 600 MG/1
600 TABLET ORAL AT BEDTIME
Status: DISCONTINUED | OUTPATIENT
Start: 2018-09-18 | End: 2018-09-19 | Stop reason: HOSPADM

## 2018-09-18 RX ORDER — ALUMINA, MAGNESIA, AND SIMETHICONE 2400; 2400; 240 MG/30ML; MG/30ML; MG/30ML
15 SUSPENSION ORAL ONCE
Status: COMPLETED | OUTPATIENT
Start: 2018-09-18 | End: 2018-09-18

## 2018-09-18 RX ORDER — ACETAMINOPHEN 325 MG/1
650 TABLET ORAL EVERY 4 HOURS PRN
Status: DISCONTINUED | OUTPATIENT
Start: 2018-09-18 | End: 2018-09-19 | Stop reason: HOSPADM

## 2018-09-18 RX ORDER — PANTOPRAZOLE SODIUM 40 MG/1
40 TABLET, DELAYED RELEASE ORAL
Status: DISCONTINUED | OUTPATIENT
Start: 2018-09-18 | End: 2018-09-18

## 2018-09-18 RX ORDER — SODIUM CHLORIDE 9 MG/ML
INJECTION, SOLUTION INTRAVENOUS CONTINUOUS
Status: DISCONTINUED | OUTPATIENT
Start: 2018-09-18 | End: 2018-09-19 | Stop reason: HOSPADM

## 2018-09-18 RX ORDER — ASPIRIN AND DIPYRIDAMOLE 25; 200 MG/1; MG/1
1 CAPSULE, EXTENDED RELEASE ORAL DAILY
Status: DISCONTINUED | OUTPATIENT
Start: 2018-09-18 | End: 2018-09-19 | Stop reason: HOSPADM

## 2018-09-18 RX ORDER — SIMVASTATIN 10 MG
10 TABLET ORAL AT BEDTIME
Status: DISCONTINUED | OUTPATIENT
Start: 2018-09-18 | End: 2018-09-19 | Stop reason: HOSPADM

## 2018-09-18 RX ORDER — ONDANSETRON 2 MG/ML
4 INJECTION INTRAMUSCULAR; INTRAVENOUS ONCE
Status: COMPLETED | OUTPATIENT
Start: 2018-09-18 | End: 2018-09-18

## 2018-09-18 RX ADMIN — PANTOPRAZOLE SODIUM 40 MG: 40 TABLET, DELAYED RELEASE ORAL at 09:14

## 2018-09-18 RX ADMIN — SODIUM CHLORIDE: 9 INJECTION, SOLUTION INTRAVENOUS at 09:21

## 2018-09-18 RX ADMIN — SUCRALFATE 1 G: 1 SUSPENSION ORAL at 22:14

## 2018-09-18 RX ADMIN — DOCUSATE SODIUM 100 MG: 100 CAPSULE, LIQUID FILLED ORAL at 12:58

## 2018-09-18 RX ADMIN — GABAPENTIN 600 MG: 600 TABLET, FILM COATED ORAL at 20:45

## 2018-09-18 RX ADMIN — ACETAMINOPHEN 650 MG: 325 TABLET, FILM COATED ORAL at 16:54

## 2018-09-18 RX ADMIN — Medication 0.3 MG: at 20:41

## 2018-09-18 RX ADMIN — GABAPENTIN 300 MG: 300 CAPSULE ORAL at 10:40

## 2018-09-18 RX ADMIN — SODIUM CHLORIDE: 9 INJECTION, SOLUTION INTRAVENOUS at 00:55

## 2018-09-18 RX ADMIN — SIMVASTATIN 10 MG: 10 TABLET, FILM COATED ORAL at 20:45

## 2018-09-18 RX ADMIN — DOCUSATE SODIUM 100 MG: 100 CAPSULE, LIQUID FILLED ORAL at 20:42

## 2018-09-18 RX ADMIN — LIDOCAINE HYDROCHLORIDE 30 ML: 20 SOLUTION ORAL; TOPICAL at 15:23

## 2018-09-18 RX ADMIN — MORPHINE SULFATE 2 MG: 2 INJECTION, SOLUTION INTRAMUSCULAR; INTRAVENOUS at 09:14

## 2018-09-18 RX ADMIN — GABAPENTIN 300 MG: 300 CAPSULE ORAL at 10:38

## 2018-09-18 RX ADMIN — SODIUM CHLORIDE: 9 INJECTION, SOLUTION INTRAVENOUS at 16:56

## 2018-09-18 RX ADMIN — ONDANSETRON 4 MG: 2 INJECTION, SOLUTION INTRAMUSCULAR; INTRAVENOUS at 00:55

## 2018-09-18 RX ADMIN — MORPHINE SULFATE 2 MG: 2 INJECTION, SOLUTION INTRAMUSCULAR; INTRAVENOUS at 05:32

## 2018-09-18 RX ADMIN — METOCLOPRAMIDE 5 MG: 5 INJECTION, SOLUTION INTRAMUSCULAR; INTRAVENOUS at 02:42

## 2018-09-18 RX ADMIN — PANTOPRAZOLE SODIUM 40 MG: 40 INJECTION, POWDER, FOR SOLUTION INTRAVENOUS at 20:47

## 2018-09-18 RX ADMIN — ALUMINUM HYDROXIDE, MAGNESIUM HYDROXIDE, AND DIMETHICONE 15 ML: 400; 400; 40 SUSPENSION ORAL at 00:04

## 2018-09-18 RX ADMIN — ASPIRIN AND EXTENDED-RELEASE DIPYRIDAMOLE 1 CAPSULE: 25; 200 CAPSULE ORAL at 12:58

## 2018-09-18 ASSESSMENT — ENCOUNTER SYMPTOMS
ARTHRALGIAS: 0
COUGH: 0
HEADACHES: 0
NUMBNESS: 0
LIGHT-HEADEDNESS: 0
WOUND: 0
ABDOMINAL DISTENTION: 0
FLANK PAIN: 0
FEVER: 0
NAUSEA: 1
CHEST TIGHTNESS: 0
BACK PAIN: 0
VOICE CHANGE: 0
COLOR CHANGE: 0
SHORTNESS OF BREATH: 0
DIZZINESS: 0
MYALGIAS: 0
NECK PAIN: 0
VOMITING: 1
CHILLS: 0
NECK STIFFNESS: 0
HEMATURIA: 0
CONFUSION: 0
ANAL BLEEDING: 0
DYSURIA: 0
WHEEZING: 0
ABDOMINAL PAIN: 1
PALPITATIONS: 0
DIAPHORESIS: 0
BLOOD IN STOOL: 0

## 2018-09-18 ASSESSMENT — ACTIVITIES OF DAILY LIVING (ADL)
ADLS_ACUITY_SCORE: 15
ADLS_ACUITY_SCORE: 11

## 2018-09-18 ASSESSMENT — PAIN DESCRIPTION - DESCRIPTORS
DESCRIPTORS: PRESSURE
DESCRIPTORS: PRESSURE
DESCRIPTORS: CRAMPING
DESCRIPTORS: CRAMPING

## 2018-09-18 NOTE — PLAN OF CARE
"Problem: Patient Care Overview  Goal: Plan of Care/Patient Progress Review  diagnostic tests and consults completed and resulted   -vital signs normal or at patient baseline   Nurse to notify provider when observation goals have been met and patient is ready for discharge.   Outcome: No Change  Patient rested periodically on shift. Verbalizes nausea. Given PRN reglan x1. Emesis noted x1. Rates pain 4/10 to abdomen. States this is tolerable for her has not requested additional pain intervention. Vitals stable. /74  Pulse 76  Temp 98.6  F (37  C) (Temporal)  Resp 18  Ht 1.676 m (5' 6\")  Wt 79.1 kg (174 lb 6.1 oz)  SpO2 94%  BMI 28.15 kg/m2. Bowel sounds active throughout. Belching noted. Passing flatus appropriately. IV fluids continue to infuse.       0537 update. Patient request pain medication. PRN morphine given x1. Continues to report nausea. Not due to nausea medication at this time. Increased belching noted. Essential oils utilized.     Face to face report given with opportunity to observe patient.    Report given to AYAKA Conley   9/18/2018  7:13 AM      "

## 2018-09-18 NOTE — PROGRESS NOTES
Assessment completed see flowsheet.    LOC: alert Conversational  Others present: Patient and Spouse Greg     Dx: Abdominal Pain    Lives with: Spouse Greg  Living at:  Home  Transportation: YES  Greg will transport on discharge.    Equipment used: Glucometer, Walker and motorized scooter.   Support System: Description of Support System: Supportive, Involved  Homecare//County Services:   No  [place]: NO      [facility] Referral line called: NA    Primary PCP: Dr. Villela  Health Care Directive: YES  On file. Agent  Greg    Pharmacy: [facility]  Meds and appointments management: YES  No concerns with medications. Makes own appointments.    Adequate Resources for needs (housing, utilities, food/med): YES  Household chores: Shared with  Greg.   Work/community/social activity: YES  Retired, [employer] to [place] in winter. Enjoys [employer]     ADLs: Independent  Ambulation:Independent when in her home. Will use walker or  motorizes scooter outside as their property has hills.   Falls: None    Mental health: Denies concerns  Substance abuse: Quit smoking 35 yrs ago, No alcohol, No illicit drug use.   Stressors: Denies concerns    Able to Return to Prior Living Arrangements: YES    Goals: Return home.     Barriers: None assessed    XIMENA: None     Cee stated she plans to return home on discharge. Indicates no needs or barriers to return home.  No needs or barriers assessed.  Greg will transport on discharge.

## 2018-09-18 NOTE — PLAN OF CARE
Pt is A&O, VSS, bwls are hyperactive and had small amounts formed BM.  Ambulated 2x in funez with -steady on feet and is independent in room.  She has been NPO.  Did receive 2 mg IV Morphine this shift for abdominal pain. No c/o nausea.  Took a.m pills t/o morning so as not to become nauseated.  Has NS infusng at 125 mls/hr.  Makes needs known-call light in reach. Pleasant    At 1420 pt had c/o neck/chest pain and pressure after she sat up to get OOB.  She became dizzy so laid back down and put her call light on.  BG was 95, VSS a charted.  Called Alexia NP, EKG, Trop  and abd xray ordered and in process.  Pt was pale initially with color coming back into cheeks now.       Orders complete, pain/pressure decreasing in neck/chest per pt. RT to give EKG results to NP.   with pt. Alexia in to see pt, ordering GI cocktail    Face to face report given with opportunity to observe patient.    Report given to Jareth Cadet   9/18/2018  3:23 PM

## 2018-09-18 NOTE — H&P
[facility]    History and Physical  Hospitalist       Date of Admission:  9/17/2018    Assessment & Plan   Cee Pereira is a 72 year old female who presents with acute onset left upper quadrant pain shortly after dinner.  Patient has a history significant for SBO and is status post colectomy.    Left upper quadrant pain, history of colectomy.   -Patient states that her pain responds to morphine  -Abdominal x-ray with air-fluid levels  -Last bowel movement was 11:00 today  -Labs generally unremarkable, very mild leukocytosis  --Consider CT abdomen in the am if pain not improved  -Will not cover with antibiotics unless the patient spikes a fever    Diabetes type 2- patient took her home insulin this evening  -We will cover with low-dose sliding scale and consider adding long-acting insulin will be here through tomorrow night    Hyperlipidemia-continue statin    Patient likely has CKD stage III given her elevated creatinine and estimated GFR    CODE STATUS: DNR/DNI  DVT prophylaxis: SCDs, patient is also ambulatory    Active Problems:   LUQ abdominal pain    # Pain Assessment:  Current Pain Score 9/17/2018   Patient currently in pain? -   Pain score (0-10) 7   Pain location -   Pain descriptors -       DVT Prophylaxis: Pneumatic Compression Devices  Code Status: DNR / DNI    Disposition: Expected discharge in less than 1 day    Myron Salvador    Primary Care Physician   Matthew Villela    Chief Complaint   Left sided abdominal pain    History is obtained from the patient    History of Present Illness   Cee Pereira is a 72 year old female who presents with acute onset left-sided abdominal pain shortly after dinner this evening.  Patient had a bowel movement at 11 AM which was reportedly normal.  She denies any blood in her stool.  Patient does have a history of colectomy.  She also has a history of small bowel obstructions the last roughly 2 years ago.  She states that this feels somewhat similar  "but this is admittedly more \"gassy\" in quality.  Patient is noted to have a mild leukocytosis in the ER.  She denies any recent fevers, chills, chest pain, change in urine habits.  The patient was nauseous after dinner.  When she presented to the ER she had 1 or 2 bouts of emesis.  She was   given morphine for her abdominal pain which she currently grades a 5 out of 10.  She states that morphine works well for her pain.  Patient denies eating any suspect food, states that her  who lives with her is not sick and was not sick from any meals.  The patient did take her long-acting insulin after dinner this evening.    Past Medical History    I have reviewed this patient's medical history and updated it with pertinent information if needed.   Past Medical History:   Diagnosis Date     Diabetes mellitus (H)      Diverticulitis      Herniated lumbar intervertebral disc        Past Surgical History   I have reviewed this patient's surgical history and updated it with pertinent information if needed.  Past Surgical History:   Procedure Laterality Date     APPENDECTOMY OPEN       CHOLECYSTECTOMY       COLECTOMY WITHOUT COLOSTOMY      for diverticulitis     DECOMPRESSION LUMBAR ONE LEVEL  7/31/2013    L4-L5     JOINT REPLACEMENT      bilateral hip replacements       Prior to Admission Medications   Prior to Admission Medications   Prescriptions Last Dose Informant Patient Reported? Taking?   Celecoxib (CELEBREX PO) 9/17/2018 at Unknown time  Yes Yes   Sig: Take 200 mg by mouth 2 times daily    Coenzyme Q10 (COQ-10 PO) 9/17/2018 at Unknown time  Yes Yes   Sig: Take 100 mg by mouth daily   GABAPENTIN PO 9/17/2018 at Unknown time  Yes Yes   Sig: Take 300 mg by mouth 2 times daily AM, second dose at 1200   GABAPENTIN PO 9/16/2018 at Unknown time  Yes Yes   Sig: Take 600 mg by mouth At Bedtime   METOPROLOL TARTRATE PO 9/17/2018 at Unknown time  Yes Yes   Sig: Take 25 mg by mouth daily    PANTOPRAZOLE SODIUM PO 9/17/2018 at " Unknown time  Yes Yes   Sig: Take 40 mg by mouth every morning (before breakfast)   SIMVASTATIN PO 9/17/2018 at Unknown time  Yes Yes   Sig: Take 10 mg by mouth daily   aspirin-dipyridamole (AGGRENOX)  MG per 12 hr capsule 9/17/2018 at Unknown time  Yes Yes   Sig: Take 1 capsule by mouth daily   insulin degludec (TRESIBA FLEXTOUCH) 100 UNIT/ML pen 9/17/2018 at Unknown time  Yes Yes   Sig: Inject 24 Units Subcutaneous At Bedtime       Facility-Administered Medications: None     Allergies   Allergies   Allergen Reactions     Diatrizoate      Other reaction(s): *Unknown     Iodides      Other reaction(s): *Unknown  Tincture     Ivp Dye [Contrast Dye]      Lortab [Hydrocodone-Acetaminophen] Itching     headaches     No Clinical Screening - See Comments Itching     Pioglitazone Itching       Social History   I have reviewed this patient's social history and updated it with pertinent information if needed. Cee Pereira  reports that she has quit smoking. She has never used smokeless tobacco. She reports that she does not drink alcohol.    Family History   I have reviewed this patient's family history and updated it with pertinent information if needed.   History reviewed. No pertinent family history.    Review of Systems   A comprehensive 14 point review of systems was negative except for that mentioned in the history of present illness.       Physical Exam   Temp: 98.4  F (36.9  C) Temp src: Tympanic BP: 147/77 Pulse: 73 Heart Rate: 78 Resp: 16 SpO2: 98 % O2 Device: None (Room air)    Vital Signs with Ranges  Temp:  [98.2  F (36.8  C)-98.4  F (36.9  C)] 98.4  F (36.9  C)  Pulse:  [73-76] 73  Heart Rate:  [74-79] 78  Resp:  [16-20] 16  BP: (140-156)/(72-88) 147/77  SpO2:  [96 %-99 %] 98 %  0 lbs 0 oz    Vitals and nursing notes reviewed in the EMR  General appearance: alert, cooperative, NAD  Head: NCAT, without obvious abnormality  Eyes: EOMI, PERRLA, conjunctiva and lids without erythema or discharge  ENT: MMM,  nose normal, no oropharyngeal exudates  Lungs: CTAB, no wheezes, no rales  Heart: RRR, no m/g/r  Abdomen: soft, mildly tender, obese, BS normal; no masses or HSM, prior scars noted  Extremities: normal, atraumatic, no edema  Skin: color and texture normal, no rashes or lesions, no petechiae or purpura  MSK: JACQUES spontaneously  Neurologic: A&O x 3, CN 2-12 grossly intact     Data   Data reviewed today:  I personally reviewed abdominal x-rays and interpreted them    Recent Labs  Lab 09/17/18  2207   WBC 11.9*   HGB 14.4   MCV 84   *      POTASSIUM 3.7   CHLORIDE 105   CO2 28   BUN 19   CR 1.53*   ANIONGAP 7   ELVIS 8.8   *   ALBUMIN 3.7   PROTTOTAL 7.9   BILITOTAL 0.3   ALKPHOS 85   ALT 29   AST 20   LIPASE 166       No results found for this or any previous visit (from the past 24 hour(s)).

## 2018-09-18 NOTE — ED PROVIDER NOTES
History     Chief Complaint   Patient presents with     Abdominal Pain     History of colon removal for diverticulitis and small bowel obstruction.     Patient is a 72 year old female presenting with abdominal pain. The history is provided by the patient.   Abdominal Pain   Pain location:  Generalized  Pain quality: cramping    Pain radiates to:  Does not radiate  Onset quality:  Gradual  Timing:  Constant  Chronicity:  Recurrent  Associated symptoms: nausea and vomiting    Associated symptoms: no chest pain, no chills, no cough, no dysuria, no fever, no hematuria and no shortness of breath          Problem List:    Patient Active Problem List    Diagnosis Date Noted     Viral gastroenteritis 05/23/2017     Priority: Medium     Dehydration 05/23/2017     Priority: Medium     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) 05/23/2017     Priority: Medium     Crohn's disease of large intestine without complication (H) 05/23/2017     Priority: Medium     Gastroenteritis 05/23/2017     Priority: Medium     ACP (advance care planning) 08/19/2016     Priority: Medium     Advance Care Planning 8/19/2016: Receipt of ACP document:  Received: Health Care Directive which was witnessed or notarized on 05/01/2000.  Document previously scanned on 08/18/2016.  Validation form completed and sent to be scanned.  Code Status reflects choices in most recent ACP document.  Confirmed/documented designated decision maker(s).  Added by Mary Weston             Small bowel obstruction 08/17/2016     Priority: Medium     SBO (small bowel obstruction) 08/17/2016     Priority: Medium        Past Medical History:    Past Medical History:   Diagnosis Date     Diabetes mellitus (H)      Diverticulitis      Herniated lumbar intervertebral disc        Past Surgical History:    Past Surgical History:   Procedure Laterality Date     APPENDECTOMY OPEN       CHOLECYSTECTOMY       COLECTOMY WITHOUT COLOSTOMY      for diverticulitis      DECOMPRESSION LUMBAR ONE LEVEL  7/31/2013    L4-L5     JOINT REPLACEMENT      bilateral hip replacements       Family History:    No family history on file.    Social History:  Marital Status:   [2]  Social History   Substance Use Topics     Smoking status: Former Smoker     Smokeless tobacco: Not on file     Alcohol use Not on file        Medications:      aspirin-dipyridamole (AGGRENOX)  MG per 12 hr capsule   Celecoxib (CELEBREX PO)   Coenzyme Q10 (COQ-10 PO)   GABAPENTIN PO   GABAPENTIN PO   insulin degludec (TRESIBA FLEXTOUCH) 100 UNIT/ML pen   METOPROLOL TARTRATE PO   PANTOPRAZOLE SODIUM PO   SIMVASTATIN PO         Review of Systems   Constitutional: Negative for chills, diaphoresis and fever.   HENT: Negative for voice change.    Eyes: Negative for visual disturbance.   Respiratory: Negative for cough, chest tightness, shortness of breath and wheezing.    Cardiovascular: Negative for chest pain, palpitations and leg swelling.   Gastrointestinal: Positive for abdominal pain, nausea and vomiting. Negative for abdominal distention, anal bleeding and blood in stool.   Genitourinary: Negative for decreased urine volume, dysuria, flank pain and hematuria.   Musculoskeletal: Negative for arthralgias, back pain, gait problem, myalgias, neck pain and neck stiffness.   Skin: Negative for color change, pallor, rash and wound.   Neurological: Negative for dizziness, syncope, light-headedness, numbness and headaches.   Psychiatric/Behavioral: Negative for confusion and suicidal ideas.       Physical Exam   BP: 154/86  Pulse: 76  Heart Rate: 75  Temp: 98.2  F (36.8  C)  Resp: 20  SpO2: 97 %      Physical Exam   Constitutional: She is oriented to person, place, and time. She appears well-developed and well-nourished.   HENT:   Head: Normocephalic and atraumatic.   Mouth/Throat: No oropharyngeal exudate.   Eyes: Conjunctivae are normal. Pupils are equal, round, and reactive to light.   Neck: Normal range of  motion. Neck supple. No JVD present. No tracheal deviation present. No thyromegaly present.   Cardiovascular: Normal rate, regular rhythm, normal heart sounds and intact distal pulses.  Exam reveals no gallop and no friction rub.    No murmur heard.  Pulmonary/Chest: Effort normal and breath sounds normal. No stridor. No respiratory distress. She has no wheezes. She has no rales. She exhibits no tenderness.   Abdominal: Soft. Bowel sounds are normal. She exhibits distension. She exhibits no mass. There is generalized tenderness. There is no rebound and no guarding.   Musculoskeletal: Normal range of motion. She exhibits no edema or tenderness.   Lymphadenopathy:     She has no cervical adenopathy.   Neurological: She is alert and oriented to person, place, and time.   Skin: Skin is warm and dry. No rash noted. No erythema. No pallor.   Psychiatric: Her behavior is normal.   Nursing note and vitals reviewed.      ED Course     ED Course     Procedures                   Results for orders placed or performed during the hospital encounter of 09/17/18 (from the past 24 hour(s))   CBC with platelets differential   Result Value Ref Range    WBC 11.9 (H) 4.0 - 11.0 10e9/L    RBC Count 5.15 3.8 - 5.2 10e12/L    Hemoglobin 14.4 11.7 - 15.7 g/dL    Hematocrit 43.1 35.0 - 47.0 %    MCV 84 78 - 100 fl    MCH 28.0 26.5 - 33.0 pg    MCHC 33.4 31.5 - 36.5 g/dL    RDW 14.0 10.0 - 15.0 %    Platelet Count 108 (L) 150 - 450 10e9/L    Diff Method Automated Method     % Neutrophils 61.5 %    % Lymphocytes 29.5 %    % Monocytes 7.0 %    % Eosinophils 1.4 %    % Basophils 0.3 %    % Immature Granulocytes 0.3 %    Nucleated RBCs 0 0 /100    Absolute Neutrophil 7.3 1.6 - 8.3 10e9/L    Absolute Lymphocytes 3.5 0.8 - 5.3 10e9/L    Absolute Monocytes 0.8 0.0 - 1.3 10e9/L    Absolute Eosinophils 0.2 0.0 - 0.7 10e9/L    Absolute Basophils 0.0 0.0 - 0.2 10e9/L    Abs Immature Granulocytes 0.0 0 - 0.4 10e9/L    Absolute Nucleated RBC 0.0     Comprehensive metabolic panel   Result Value Ref Range    Sodium 140 133 - 144 mmol/L    Potassium 3.7 3.4 - 5.3 mmol/L    Chloride 105 94 - 109 mmol/L    Carbon Dioxide 28 20 - 32 mmol/L    Anion Gap 7 3 - 14 mmol/L    Glucose 154 (H) 70 - 99 mg/dL    Urea Nitrogen 19 7 - 30 mg/dL    Creatinine 1.53 (H) 0.52 - 1.04 mg/dL    GFR Estimate 33 (L) >60 mL/min/1.7m2    GFR Estimate If Black 40 (L) >60 mL/min/1.7m2    Calcium 8.8 8.5 - 10.1 mg/dL    Bilirubin Total 0.3 0.2 - 1.3 mg/dL    Albumin 3.7 3.4 - 5.0 g/dL    Protein Total 7.9 6.8 - 8.8 g/dL    Alkaline Phosphatase 85 40 - 150 U/L    ALT 29 0 - 50 U/L    AST 20 0 - 45 U/L   Lipase   Result Value Ref Range    Lipase 166 73 - 393 U/L   Amylase   Result Value Ref Range    Amylase 75 30 - 110 U/L   CRP inflammation   Result Value Ref Range    CRP Inflammation <2.9 0.0 - 8.0 mg/L       Medications   morphine (PF) injection 4 mg (4 mg Intravenous Given 9/17/18 2220)   0.9% sodium chloride BOLUS (0 mLs Intravenous Stopped 9/17/18 2340)   ondansetron (ZOFRAN) injection 4 mg (4 mg Intravenous Given 9/17/18 2214)   famotidine (PEPCID) injection 20 mg (20 mg Intravenous Given 9/17/18 2216)       Assessments & Plan (with Medical Decision Making)   Abdominal pain + distenstion  Hx of massive abdominal surgery and multiple SBO in the past  Pt is passing gas but not bowel movement  Labs; slightly elevated WBC  Abd xray: suspicious to SBO  Spoke to Dr rg, accepted for admission  I have reviewed the nursing notes.    I have reviewed the findings, diagnosis, plan and need for follow up with the patient.      New Prescriptions    No medications on file       Final diagnoses:   Partial small bowel obstruction       9/17/2018   [facility]     Denzel Hernandez MD  09/19/18 1481

## 2018-09-18 NOTE — ED NOTES
Attempting to discharge pt and pt reports her pain is worse 5/10. After talking with pt, pts pain increased to 7/10 in the LUQ of abdomen. MD notified. Will give more morphine and admit to observation. Pt agrees to plan.

## 2018-09-18 NOTE — ED NOTES
Pt had 200 ml emesis at this time. Reported to Dr. Hernandez. New orders for zofran 4mg and maintenance IVF of NS at 125ml/hour.

## 2018-09-18 NOTE — DISCHARGE INSTRUCTIONS
Small Bowel Obstruction     Small bowel obstruction can lead to tissue damage and even tissue death.   A small bowel obstruction occurs when part or all of the small intestine (bowel) is blocked. As a result, digestive contents can t move through the bowel properly and out of the body. Treatment is needed right away to remove the blockage. This can ease painful symptoms. It can also prevent serious problems, such as tissue death or bursting (rupture) of the small bowel. Without treatment, a small bowel obstruction can be fatal.  Causes of small bowel obstruction  A small bowel obstruction can be caused by:    Scar tissue (adhesions). These may form after belly (abdominal) surgery or an infection.    Hernia. A hernia is when an organ pushes through a weak spot or tear in the abdomen wall. Part of the small bowel can push out and be seen as a bulge under the belly. Hernias can also occur internally.    Certain health problems. These include when part of the bowel slides inside another part (intussusception). Other causes include irritable bowel disease such as Crohn s disease, and inflammation and sores in the intestine (ulcerative colitis).    Abnormal tissue growths (tumors). These can form on the inside or outside of the small bowel. They are usually due to cancer.  Symptoms of small bowel obstruction  Common symptoms include:    Belly cramping and pain    Belly swelling and bloating    Upset stomach (nausea) and vomiting    Can't  pass gas    Can't pass stool (constipation)    Diarrhea  Diagnosing small bowel obstruction  Your provider will ask about your symptoms and health history. You ll also have a physical exam. Tests may also be done to confirm the problem. These can include:    Imaging tests. These provide pictures of the small bowel. Common tests include X-rays and a CT scan.    Blood tests. These check for infection and other problems, such as excess fluid loss (dehydration).    Upper GI  (gastrointestinal) series with a small bowel follow-through. This test takes X-rays of the upper digestive tract from the mouth through the small bowel. An X-ray dye (contrast fluid) is used. The dye coats the inside of your upper digestive tract so it will show up clearly on X-rays.  Treating small bowel obstruction  Treatment takes place in a hospital. As part of your care, the following may be done:    No food or drink is given by mouth. This allows your bowels to rest.    An IV (intravenous) line is placed in a vein in your arm or hand. The IV line is used to give fluids. It may also be used to give medicines. These may be needed to ease pain, nausea, and other symptoms. They may also be needed to treat or prevent infections.    A soft, thin, flexible tube (nasogastric tube) is inserted through your nose and into your stomach. The tube is used to remove extra gas and fluid in your stomach and bowels. This helps to ease symptoms such as pain and swelling.    In severe cases, surgery is done. This may be needed if the small bowel is almost or totally blocked, or there is a hole in the bowel (bowel perforation). During surgery, the blockage is removed. Parts of the bowel may also be removed if there is tissue death. Other repair may be done as well, depending on what caused the blockage. Your healthcare provider will give you more information about surgery, if needed.    You ll be watched closely in the hospital until your symptoms improve. Your provider will tell you when you can go home.  Long-term concerns   After treatment, most people recover with no lasting effects. If a long part of the bowel is removed, there is a greater chance for lifelong digestive problems. Bowel movements may become irregular. Work with your provider to learn the best ways to manage any symptoms you may have, and to protect your health.  When to call your healthcare provider  Call your provider right away if you have any of the  following:    Severe pain (call 911)    Belly swelling or cramping that won t go away    Can t pass stool or gas    Nausea or vomiting (especially if the vomit looks or smells like stool)   Date Last Reviewed: 7/1/2016 2000-2017 [employer]. [address] [place] [id number]. All rights reserved. This information is not intended as a substitute for professional medical care. Always follow your healthcare professional's instructions.

## 2018-09-18 NOTE — PLAN OF CARE
"[facility] Observation Note:  Patient is registered to observation and is in 3228/3228-1 at approximately 0100 via cart accompanied by transport tech from emergency room . Report received from Te in SBAR format at 0050 via telephone. Patient ambulated to bed via self.. Patient is alert and oriented X 3, reports pain; rates at 4 on 0-10 scale.  Patient oriented to room, unit, hourly rounding, and plan of care. Explained the admission packet including \"What is Observation Status\" and patient handbook with patient bill of rights brochure. Will continue to monitor and document as needed.  Nursing Observation criteria listed below was met:  Health care directives status obtained and documented: No  Care Everywhere authorization completed No      If initial lactic acid >2.0, repeat lactic acid drawn within one hour of arrival to unit: NA. If no, state reason: na    Patient identifies a surrogate decision maker: Yes If yes, who: Contact Information:See facesheet  Vaccination assessment and education completed: Yes   Vaccinations received prior to hospitalization: Pneumovax yes  Influenza(seasonal)  YES   Vaccination(s) ordered: patient declines    Skin issues/needs documented:No  Isolation Patient: no Education given and documented, correct sign in place and documentation row added to PCS:  No    Fall Prevention: Observation fall risk completed:  Yes Education given and documented, sticker and magnet in place: Yes    General Care Plan initiated with observation goal(s): Yes  Education (including assessment) Documented: Yes  New medication information given to patient and documented: No  Patient elects to use own medications from home during hospitalization:  No If yes, a MD order was obtained to use Medications from Home:  No and home medications were sent to Pharmacy for verification for use during hospitalization: Yes  Patient has discharge needs (If yes, please explain): No        "

## 2018-09-18 NOTE — PROGRESS NOTES
[facility]    Hospitalist Progress Note    Date of Service (when I saw the patient): 09/18/2018    Assessment & Plan   Cee Pereira is a 72 year old female who was admitted on 9/17/2018.       Small bowel obstruction: Prior history of SBO, multiple abdominal surgeries in the past. Bowel movements today but liquid, passing flatus. No vomiting ,still some minimal nausea. Repeat KUB does show some improvement but decompression of distal small bowel only.   -Continue NPO, IVF, close monitoring  -Consider repeat KUB versus CT scan in AM if still significant pain  -In the past MS worked well for her, however she has been having progressive headache, will try dilaudid.      Chest pain: Likely gastric. Troponin negative, no EKG changes, pain improved with GI cocktail.       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): Hold tresiba, low sliding scale while NPO.       Crohn's disease of large intestine without complication (H): Not taking any long term controller medications.       GERD (gastroesophageal reflux disease): Upper epigastric pain this afternoon radiating to chest, improve with GI cocktail.  -Carafate TID  -Switch oral Protonix to IV      HTN (hypertension): Continue oral metoprolol, she has been stable.       # Pain Assessment:  Current Pain Score 9/18/2018   Patient currently in pain? yes   Pain score (0-10) -   Pain location Chest   Pain descriptors Pressure   - Cee is experiencing pain due to abdominal/chest pain. Pain management was discussed and the plan was created in a collaborative fashion.  Cee's response to the current recommendations: engaged  - Please see the plan for pain management as documented above    DVT Prophylaxis: Pneumatic Compression Devices and Ambulate every shift  Code Status: DNR/DNI    Disposition: Expected discharge in 2-3 days once SBO resolve, taking po adequately.    Jazmine Jasso, CNP    Interval History   Pain still quite severe this morning but  improved after some flatus and bowel movement. She still has some nausea as well but well controlled with antiemetics. Worsening headache this afternoon.     -Data reviewed today: I reviewed all new labs and imaging results over the last 24 hours.     Physical Exam   Temp: 99.1  F (37.3  C) Temp src: Tympanic BP: 146/61 Pulse: 76 Heart Rate: 101 Resp: 18 SpO2: 93 % O2 Device: None (Room air)    Vitals:    09/18/18 0115   Weight: 79.1 kg (174 lb 6.1 oz)     Vital Signs with Ranges  Temp:  [98.2  F (36.8  C)-99.1  F (37.3  C)] 99.1  F (37.3  C)  Pulse:  [72-76] 76  Heart Rate:  [] 101  Resp:  [16-20] 18  BP: (140-156)/(61-88) 146/61  SpO2:  [92 %-99 %] 93 %  I/O last 3 completed shifts:  In: 1754 [P.O.:240; I.V.:1514]  Out: 700 [Urine:700]    Peripheral IV 09/17/18 Left (Active)   Site Assessment WDL 9/18/2018  7:34 AM   Line Status Infusing;Checked every 1-2 hour 9/18/2018  7:34 AM   Phlebitis Scale 0-->no symptoms 9/18/2018  7:34 AM   Infiltration Scale 0 9/18/2018  7:34 AM   Dressing Intervention New dressing  9/17/2018 10:06 PM   Number of days:1     Line/device assessment(s) completed for medical necessity    Constitutional: Awake,alert, ill appearing  Respiratory: Clear bilaterally, no wheezes,crackles or rhonchi.  Cardiovascular: HRR, no murmurs, rubs, thrills.   GI: This morning distended, firm, very hyperactive bowel sounds, tender throughout. This afternoon less distended more soft, tender over epigastrium, bowel sounds less active.   Skin/Integumen: No rashes, bruising open areas.         Medications     sodium chloride 125 mL/hr at 09/18/18 0921       aspirin-dipyridamole  1 capsule Oral Daily     docusate sodium  100 mg Oral BID     gabapentin (NEURONTIN) capsule 300 mg  300 mg Oral BID 09 12     gabapentin (NEURONTIN) tablet 600 mg  600 mg Oral At Bedtime     insulin aspart  1-3 Units Subcutaneous TID AC     insulin aspart  1-3 Units Subcutaneous At Bedtime     metoprolol tartrate (LOPRESSOR)  tablet 25 mg  25 mg Oral Daily     pantoprazole (PROTONIX) IV  40 mg Intravenous BID     simvastatin (ZOCOR) tablet 10 mg  10 mg Oral At Bedtime     sucralfate  1 g Oral 4x Daily AC & HS       Data     Recent Labs  Lab 09/18/18  1446 09/18/18  0546 09/17/18  2207   WBC  --  15.8* 11.9*   HGB  --  14.7 14.4   MCV  --  84 84   PLT  --  98* 108*   NA  --  141 140   POTASSIUM  --  4.3 3.7   CHLORIDE  --  108 105   CO2  --  24 28   BUN  --  17 19   CR  --  1.21* 1.53*   ANIONGAP  --  9 7   ELVIS  --  8.6 8.8   GLC  --  180* 154*   ALBUMIN  --  3.5 3.7   PROTTOTAL  --  7.4 7.9   BILITOTAL  --  0.3 0.3   ALKPHOS  --  85 85   ALT  --  42 29   AST  --  35 20   LIPASE  --   --  166   TROPI <0.015  --   --        Recent Results (from the past 24 hour(s))   XR Abdomen 2 Views    Narrative    PROCEDURE: XR ABDOMEN 2 VW 9/17/2018 10:57 PM    HISTORY: r/o obstruction;     COMPARISONS: None.    TECHNIQUE: Supine and upright views.    FINDINGS: There is no free intraperitoneal air. Surgical clips are  seen in the right upper quadrant. There are also surgical clips over  the left lower quadrant.    There are gas-filled small bowel loops in the left side of the  abdomen. These are mildly dilated. There are multiple gas fluid  levels. More distal right-sided small bowel loops are more  decompressed and there is some air within the colon to the level of  the rectum.    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 There is multilevel  degenerative change within the spine.         Impression    IMPRESSION: Abnormal bowel gas pattern with small bowel distention and  air-fluid levels suspicious for small bowel obstruction.    ELYS UMANZOR MD   XR Abdomen Port 1 View    Narrative    PROCEDURE: XR ABDOMEN PORT 1 VW 9/18/2018 2:41 PM    HISTORY: SBO;     COMPARISONS: 9/17/2018.    TECHNIQUE: Supine views.    FINDINGS: Surgical clips are seen in the right upper quadrant and over  the left ilium.    There are gas-filled small bowel loops in the  left side of the abdomen  and pelvis measuring up to 3.7 cm in transverse dimension. More distal  small bowel loops are more normal in caliber measuring 2.1 cm. This  differential caliber is consistent with a small bowel obstruction.  There is less bowel gas than on the prior exam.    There are degenerative changes in the spine. There are bilateral hip  arthroplasties.         Impression    IMPRESSION: Persistent abnormal bowel gas pattern with continued  proximal small bowel distention consistent with a mild small bowel  obstruction.    ELSY UMANZOR MD

## 2018-09-18 NOTE — ED NOTES
Pt reports she had sudden onset of diffuse abdominal pain today. Pt has history of bowel obstruction and diverticulitis. Pt reports that she gets morphine, feels better and goes home. Pt had formed BM today but doesn't have a regular bowel pattern because of colon removal. Call light in reach.  at bedside.

## 2018-09-19 ENCOUNTER — APPOINTMENT (OUTPATIENT)
Dept: GENERAL RADIOLOGY | Facility: HOSPITAL | Age: 73
DRG: 390 | End: 2018-09-19
Attending: INTERNAL MEDICINE
Payer: MEDICARE

## 2018-09-19 VITALS
HEIGHT: 66 IN | DIASTOLIC BLOOD PRESSURE: 79 MMHG | TEMPERATURE: 98 F | SYSTOLIC BLOOD PRESSURE: 149 MMHG | BODY MASS INDEX: 28.31 KG/M2 | RESPIRATION RATE: 16 BRPM | WEIGHT: 176.15 LBS | OXYGEN SATURATION: 95 % | HEART RATE: 72 BPM

## 2018-09-19 LAB
ANION GAP SERPL CALCULATED.3IONS-SCNC: 6 MMOL/L (ref 3–14)
BACTERIA SPEC CULT: ABNORMAL
BASOPHILS # BLD AUTO: 0 10E9/L (ref 0–0.2)
BASOPHILS NFR BLD AUTO: 0.1 %
BUN SERPL-MCNC: 13 MG/DL (ref 7–30)
CALCIUM SERPL-MCNC: 7.9 MG/DL (ref 8.5–10.1)
CHLORIDE SERPL-SCNC: 110 MMOL/L (ref 94–109)
CO2 SERPL-SCNC: 24 MMOL/L (ref 20–32)
CREAT SERPL-MCNC: 1.04 MG/DL (ref 0.52–1.04)
DIFFERENTIAL METHOD BLD: ABNORMAL
EOSINOPHIL # BLD AUTO: 0.1 10E9/L (ref 0–0.7)
EOSINOPHIL NFR BLD AUTO: 0.6 %
ERYTHROCYTE [DISTWIDTH] IN BLOOD BY AUTOMATED COUNT: 13.9 % (ref 10–15)
GFR SERPL CREATININE-BSD FRML MDRD: 52 ML/MIN/1.7M2
GLUCOSE BLDC GLUCOMTR-MCNC: 83 MG/DL (ref 70–99)
GLUCOSE BLDC GLUCOMTR-MCNC: 93 MG/DL (ref 70–99)
GLUCOSE SERPL-MCNC: 81 MG/DL (ref 70–99)
HCT VFR BLD AUTO: 35.8 % (ref 35–47)
HGB BLD-MCNC: 11.9 G/DL (ref 11.7–15.7)
IMM GRANULOCYTES # BLD: 0 10E9/L (ref 0–0.4)
IMM GRANULOCYTES NFR BLD: 0.1 %
LACTATE SERPL-SCNC: 0.7 MMOL/L (ref 0.4–2)
LYMPHOCYTES # BLD AUTO: 2.3 10E9/L (ref 0.8–5.3)
LYMPHOCYTES NFR BLD AUTO: 26.7 %
MCH RBC QN AUTO: 28.1 PG (ref 26.5–33)
MCHC RBC AUTO-ENTMCNC: 33.2 G/DL (ref 31.5–36.5)
MCV RBC AUTO: 85 FL (ref 78–100)
MONOCYTES # BLD AUTO: 0.6 10E9/L (ref 0–1.3)
MONOCYTES NFR BLD AUTO: 7.3 %
NEUTROPHILS # BLD AUTO: 5.7 10E9/L (ref 1.6–8.3)
NEUTROPHILS NFR BLD AUTO: 65.2 %
NRBC # BLD AUTO: 0 10*3/UL
NRBC BLD AUTO-RTO: 0 /100
PLATELET # BLD AUTO: 79 10E9/L (ref 150–450)
POTASSIUM SERPL-SCNC: 3.7 MMOL/L (ref 3.4–5.3)
RBC # BLD AUTO: 4.23 10E12/L (ref 3.8–5.2)
SODIUM SERPL-SCNC: 140 MMOL/L (ref 133–144)
SPECIMEN SOURCE: ABNORMAL
WBC # BLD AUTO: 8.8 10E9/L (ref 4–11)

## 2018-09-19 PROCEDURE — A9270 NON-COVERED ITEM OR SERVICE: HCPCS | Mod: GY | Performed by: INTERNAL MEDICINE

## 2018-09-19 PROCEDURE — 85025 COMPLETE CBC W/AUTO DIFF WBC: CPT | Performed by: NURSE PRACTITIONER

## 2018-09-19 PROCEDURE — 25000128 H RX IP 250 OP 636: Performed by: NURSE PRACTITIONER

## 2018-09-19 PROCEDURE — 74018 RADEX ABDOMEN 1 VIEW: CPT | Mod: TC

## 2018-09-19 PROCEDURE — 25000132 ZZH RX MED GY IP 250 OP 250 PS 637: Mod: GY | Performed by: NURSE PRACTITIONER

## 2018-09-19 PROCEDURE — 25000128 H RX IP 250 OP 636: Performed by: INTERNAL MEDICINE

## 2018-09-19 PROCEDURE — C9113 INJ PANTOPRAZOLE SODIUM, VIA: HCPCS | Performed by: NURSE PRACTITIONER

## 2018-09-19 PROCEDURE — A9270 NON-COVERED ITEM OR SERVICE: HCPCS | Mod: GY | Performed by: NURSE PRACTITIONER

## 2018-09-19 PROCEDURE — 36415 COLL VENOUS BLD VENIPUNCTURE: CPT | Performed by: NURSE PRACTITIONER

## 2018-09-19 PROCEDURE — 99239 HOSP IP/OBS DSCHRG MGMT >30: CPT | Performed by: INTERNAL MEDICINE

## 2018-09-19 PROCEDURE — 83605 ASSAY OF LACTIC ACID: CPT | Performed by: NURSE PRACTITIONER

## 2018-09-19 PROCEDURE — 80048 BASIC METABOLIC PNL TOTAL CA: CPT | Performed by: NURSE PRACTITIONER

## 2018-09-19 PROCEDURE — 25000132 ZZH RX MED GY IP 250 OP 250 PS 637: Mod: GY | Performed by: INTERNAL MEDICINE

## 2018-09-19 RX ADMIN — PANTOPRAZOLE SODIUM 40 MG: 40 INJECTION, POWDER, FOR SOLUTION INTRAVENOUS at 09:23

## 2018-09-19 RX ADMIN — SUCRALFATE 1 G: 1 SUSPENSION ORAL at 07:58

## 2018-09-19 RX ADMIN — DOCUSATE SODIUM 100 MG: 100 CAPSULE, LIQUID FILLED ORAL at 09:23

## 2018-09-19 RX ADMIN — Medication 0.5 MG: at 12:29

## 2018-09-19 RX ADMIN — ASPIRIN AND EXTENDED-RELEASE DIPYRIDAMOLE 1 CAPSULE: 25; 200 CAPSULE ORAL at 10:32

## 2018-09-19 RX ADMIN — GABAPENTIN 300 MG: 300 CAPSULE ORAL at 07:58

## 2018-09-19 RX ADMIN — ACETAMINOPHEN 650 MG: 325 TABLET, FILM COATED ORAL at 07:58

## 2018-09-19 RX ADMIN — SODIUM CHLORIDE: 9 INJECTION, SOLUTION INTRAVENOUS at 08:05

## 2018-09-19 RX ADMIN — GABAPENTIN 300 MG: 300 CAPSULE ORAL at 11:43

## 2018-09-19 RX ADMIN — Medication 0.3 MG: at 00:36

## 2018-09-19 RX ADMIN — METOPROLOL TARTRATE 25 MG: 25 TABLET ORAL at 09:23

## 2018-09-19 RX ADMIN — SUCRALFATE 1 G: 1 SUSPENSION ORAL at 11:43

## 2018-09-19 RX ADMIN — SODIUM CHLORIDE: 9 INJECTION, SOLUTION INTRAVENOUS at 00:11

## 2018-09-19 RX ADMIN — ACETAMINOPHEN 650 MG: 325 TABLET, FILM COATED ORAL at 11:49

## 2018-09-19 ASSESSMENT — ACTIVITIES OF DAILY LIVING (ADL)
BATHING: 0-->INDEPENDENT
ADLS_ACUITY_SCORE: 15
DRESS: 0-->INDEPENDENT
ADLS_ACUITY_SCORE: 15
RETIRED_EATING: 0-->INDEPENDENT
TOILETING: 0-->INDEPENDENT
AMBULATION: 0-->INDEPENDENT
COGNITION: 0 - NO COGNITION ISSUES REPORTED
ADLS_ACUITY_SCORE: 15
RETIRED_COMMUNICATION: 0-->UNDERSTANDS/COMMUNICATES WITHOUT DIFFICULTY
FALL_HISTORY_WITHIN_LAST_SIX_MONTHS: NO
ADLS_ACUITY_SCORE: 15
TRANSFERRING: 0-->INDEPENDENT
SWALLOWING: 0-->SWALLOWS FOODS/LIQUIDS WITHOUT DIFFICULTY

## 2018-09-19 ASSESSMENT — PAIN DESCRIPTION - DESCRIPTORS
DESCRIPTORS: HEADACHE

## 2018-09-19 NOTE — DISCHARGE SUMMARY
"[facility]    Discharge Summary  Hospitalist    Date of Admission:  9/17/2018  Date of Discharge:  9/19/2018  3:27 PM  Discharging Provider: Walter Collins  Date of Service (when I saw the patient): 9/19/18    Discharge Diagnoses   Partial small bowel obstruction, suspect secondary to adhesions from abdominal surgeries  Chest pain, felt to be related to GERD  Gastroesophageal reflux disease  Type 2 diabetes mellitus  Hypertension    History of Present Illness   Cee Pereira is a 72 year old female who presents with acute onset left-sided abdominal pain shortly after dinner this evening.  Patient had a bowel movement at 11 AM which was reportedly normal.  She denies any blood in her stool.  Patient does have a history of colectomy.  She also has a history of small bowel obstructions the last roughly 2 years ago.  She states that this feels somewhat similar but this is admittedly more \"gassy\" in quality.  Patient is noted to have a mild leukocytosis in the ER.  She denies any recent fevers, chills, chest pain, change in urine habits.  The patient was nauseous after dinner.  When she presented to the ER she had 1 or 2 bouts of emesis.  She was   given morphine for her abdominal pain which she currently grades a 5 out of 10.  She states that morphine works well for her pain.  Patient denies eating any suspect food, states that her  who lives with her is not sick and was not sick from any meals.  The patient did take her long-acting insulin after dinner this evening.       Hospital Course   Cee Pereira was admitted on 9/17/2018.  The following problems were addressed during her hospitalization:     Partial small bowel obstruction:   Prior history of SBO, multiple abdominal surgeries in the past. Near-total colectomy in 2002 with Dr. Vargas. Prior to that, several partial resections largel due to perforated diverticulum.  Was seen by Dr. Vail, [facility] in December 2016. Was mentioned then " that she has never had official diagnosis of Crohn's or inflammatory bowel disease. Was felt that most likely her hx of recurrent obstructions is related to adhesions. Presented with abdominal pain, bloating and distended abdomen. Was still passing liquid stool (her stools are normally always very soft at baseline), passing flatus. She had no vomiting , but some minimal nausea. Initial KUB and repeat KUB on day 1-2 of her stay showed air fluid levels, abnormal bowel gas pattern and proximal small bowel distension consistent with small bowel obstruction. Was kept NPO with IVF given. Given morphine for pain, then transitioned to dilaudid after developing progressive headache. This morning, the pain, distension and bloating had completely resolved. Tolerated oral intake and follow up KUB shows resolution of obstruction.      Chest pain:   Likely gastric. Troponin negative, no EKG changes, pain improved with GI cocktail. No events on overnight telemetry     Type 2 diabetes mellitus with neurologic complication:   Held tresiba and placed on low sliding scale while NPO. Will resume usual regimen upon discharge.      GERD (gastroesophageal reflux disease):   Upper epigastric pain the day prior to discharge. Radiating to chest, improved with GI cocktail. Given Carafate TID and switched to oral Protonix. No further intervention for discharge. Follow up with PCP if any recurrent symptoms.      HTN (hypertension):   Continued oral metoprolol, she has been stable.        Pending Results   Unresulted Labs Ordered in the Past 30 Days of this Admission     Date and Time Order Name Status Description    9/18/2018 0546 Estimated Average Glucose In process           Code Status   Full Code       Primary Care Physician   Matthew Villela    Physical Exam   Temp: 98  F (36.7  C) Temp src: Temporal BP: 149/79 Pulse: 72 Heart Rate: 86 Resp: 16 SpO2: 95 % O2 Device: None (Room air)    Vitals:    09/18/18 0115 09/19/18 0629   Weight:  79.1 kg (174 lb 6.1 oz) 79.9 kg (176 lb 2.4 oz)     Vital Signs with Ranges  Temp:  [97.1  F (36.2  C)-100.8  F (38.2  C)] 98  F (36.7  C)  Pulse:  [72-86] 72  Heart Rate:  [] 86  Resp:  [16-20] 16  BP: (140-158)/(61-79) 149/79  SpO2:  [92 %-96 %] 95 %  I/O last 3 completed shifts:  In: 2301 [I.V.:2301]  Out: 1500 [Urine:1500]    Constitutional: Alert and oriented X 3. No distress   Respiratory: CTA bilaterally. No wheezes or ronchi.    Cardiovascular: RRR. No murmurs, rubs, gallops. Normal S1/S2   GI: Soft, NTND, no organomegaly. Bowel sounds present   Integument: Warm, dry   Extremities:  No edema     # Discharge Pain Plan:   - Patient currently has headache and is not being prescribed pain medications on discharge. Advised to use over the counter analgesics PRN for headache if persistent. Was given IV dilaudid about 3 hours prior to discharge      Discharge Disposition   Discharged to home  Condition at discharge: Stable    Consultations This Hospital Stay   None    Time Spent on this Encounter   I, Walter Collins, personally saw the patient today and spent greater than 30 minutes discharging this patient.    Discharge Orders     Follow-up and recommended labs and tests    Follow up with primary care provider, Matthew Villela, within 7-10 days for hospital follow- up.  No follow up labs or test are needed.     Activity   Your activity upon discharge: activity as tolerated     Full Code     Diet   Follow this diet upon discharge: Orders Placed This Encounter     Consistent Carbohydrate Diet 9445-9175 Calories: Moderate Consistent CHO (4-6 CHO units/meal)       Discharge Medications   Current Discharge Medication List      CONTINUE these medications which have NOT CHANGED    Details   aspirin-dipyridamole (AGGRENOX)  MG per 12 hr capsule Take 1 capsule by mouth daily      Celecoxib (CELEBREX PO) Take 200 mg by mouth 2 times daily       Coenzyme Q10 (COQ-10 PO) Take 100 mg by mouth daily      !!  GABAPENTIN PO Take 300 mg by mouth 2 times daily AM, second dose at 1200      !! GABAPENTIN PO Take 600 mg by mouth At Bedtime      insulin degludec (TRESIBA FLEXTOUCH) 100 UNIT/ML pen Inject 24 Units Subcutaneous At Bedtime       METOPROLOL TARTRATE PO Take 25 mg by mouth daily       PANTOPRAZOLE SODIUM PO Take 40 mg by mouth every morning (before breakfast)      SIMVASTATIN PO Take 10 mg by mouth daily       !! - Potential duplicate medications found. Please discuss with provider.        Allergies   Allergies   Allergen Reactions     Diatrizoate      Other reaction(s): *Unknown     Iodides      Other reaction(s): *Unknown  Tincture     Ivp Dye [Contrast Dye]      Anaphylaxis     Lortab [Hydrocodone-Acetaminophen] Itching     headaches     No Clinical Screening - See Comments Itching     Pioglitazone Itching     Data   Most Recent 3 CBC's:  Recent Labs   Lab Test  09/19/18   0515  09/18/18   0546  09/17/18 2207   WBC  8.8  15.8*  11.9*   HGB  11.9  14.7  14.4   MCV  85  84  84   PLT  79*  98*  108*      Most Recent 3 BMP's:  Recent Labs   Lab Test  09/19/18   0515  09/18/18   0546  09/17/18   2207   NA  140  141  140   POTASSIUM  3.7  4.3  3.7   CHLORIDE  110*  108  105   CO2  24  24  28   BUN  13  17  19   CR  1.04  1.21*  1.53*   ANIONGAP  6  9  7   ELVIS  7.9*  8.6  8.8   GLC  81  180*  154*     Most Recent 2 LFT's:  Recent Labs   Lab Test  09/18/18   0546  09/17/18   2207   AST  35  20   ALT  42  29   ALKPHOS  85  85   BILITOTAL  0.3  0.3     Most Recent INR's and Anticoagulation Dosing History:  Anticoagulation Dose History     Recent Dosing and Labs Latest Ref Rng & Units 8/17/2016    INR 0.80 - 1.20 1.12        Most Recent 3 Troponin's:  Recent Labs   Lab Test  09/18/18   1446  04/22/18   0728  04/22/18   0603   TROPI  <0.015  <0.015  <0.015     Most Recent Cholesterol Panel:No lab results found.  Most Recent 6 Bacteria Isolates From Any Culture (See EPIC Reports for Culture Details):  Recent Labs   Lab Test   09/18/18   0245  05/23/17   1345  05/20/17   0211  08/17/16   2240  08/07/13   2346   CULT  50,000 to 100,000 colonies/mL  Mixed bacterial ed  No further identification or sensitivity done  *  No Salmonella, Shigella, Campylobacter, E. coli O157, Aeromonas, or Plesiomonas   isolated.  No Yersinia enterocolitica isolated    No Salmonella, Shigella, Campylobacter, E. coli O157, Aeromonas, or Plesiomonas   isolated. No Yersinia enterocolitica isolated    No MRSA isolated  >100,000 colonies/mL Mixed bacterial ed No further identification or  sensitivity done     Most Recent TSH, T4 and A1c Labs:  Recent Labs   Lab Test  09/18/18   0546   A1C  7.3*     Results for orders placed or performed during the hospital encounter of 09/17/18   XR Abdomen 2 Views    Narrative    PROCEDURE: XR ABDOMEN 2 VW 9/17/2018 10:57 PM    HISTORY: r/o obstruction;     COMPARISONS: None.    TECHNIQUE: Supine and upright views.    FINDINGS: There is no free intraperitoneal air. Surgical clips are  seen in the right upper quadrant. There are also surgical clips over  the left lower quadrant.    There are gas-filled small bowel loops in the left side of the  abdomen. These are mildly dilated. There are multiple gas fluid  levels. More distal right-sided small bowel loops are more  decompressed and there is some air within the colon to the level of  the rectum.    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 There is multilevel  degenerative change within the spine.         Impression    IMPRESSION: Abnormal bowel gas pattern with small bowel distention and  air-fluid levels suspicious for small bowel obstruction.    ELSY UMANZOR MD   XR Abdomen Port 1 View    Narrative    PROCEDURE: XR ABDOMEN PORT 1 VW 9/18/2018 2:41 PM    HISTORY: SBO;     COMPARISONS: 9/17/2018.    TECHNIQUE: Supine views.    FINDINGS: Surgical clips are seen in the right upper quadrant and over  the left ilium.    There are gas-filled small bowel loops in the left side  of the abdomen  and pelvis measuring up to 3.7 cm in transverse dimension. More distal  small bowel loops are more normal in caliber measuring 2.1 cm. This  differential caliber is consistent with a small bowel obstruction.  There is less bowel gas than on the prior exam.    There are degenerative changes in the spine. There are bilateral hip  arthroplasties.         Impression    IMPRESSION: Persistent abnormal bowel gas pattern with continued  proximal small bowel distention consistent with a mild small bowel  obstruction.    ELSY UMANZOR MD   XR Abdomen Port 1 View    Narrative    XR ABDOMEN PORT 1 VW    HISTORY: 72 yearsFemale partial sbo;     TECHNIQUE: Single view abdomen portable    COMPARISON: 90,018    FINDINGS: There are surgical staples in the right upper quadrant  compatible with prior cholecystectomy. There are surgical staples left  lower quadrant.    No abnormally distended loops of bowel are present.      Impression    IMPRESSION: No abnormal bowel distention is seen. There is no evidence  of bowel obstruction.    KEYA DRKAE MD

## 2018-09-19 NOTE — PLAN OF CARE
"Problem: Patient Care Overview  Goal: Plan of Care/Patient Progress Review  diagnostic tests and consults completed and resulted   -vital signs normal or at patient baseline   Nurse to notify provider when observation goals have been met and patient is ready for discharge.   Outcome: No Change  Patient reported headache and mild discomfort during initial assessment. Given PRN dilaudid x1 with positive effects. Patient incontinent of urine and stool on shift. Expresses concern that this might become her new \"normal\" Patient reassured that these symptoms are most likely acute. Patient agreeable to wearing a brief following third incontinent episode. Bowel sounds hyperactive throughout. Minimal complaints of nausea. Vitals remain stable. /66  Pulse 76  Temp 97.1  F (36.2  C) (Temporal)  Resp 16  Ht 1.676 m (5' 6\")  Wt 79.1 kg (174 lb 6.1 oz)  SpO2 93%  BMI 28.15 kg/m2. Tele report per ICU nurse Sinus rhythm/sinus tachycardia 90's-100's. IV fluids continue to infuse per order.      Problem: Bowel Obstruction (Adult)  Goal: Signs and Symptoms of Listed Potential Problems Will be Absent, Minimized or Managed (Bowel Obstruction)  Signs and symptoms of listed potential problems will be absent, minimized or managed by discharge/transition of care (reference Bowel Obstruction (Adult) CPG).   Outcome: No Change   09/19/18 0510   Bowel Obstruction   Problems Assessed (Bowel Obstruction) all   Problems Present (Bowel Obstruction) diarrhea;pain;situational response       Face to face report given with opportunity to observe patient.    Report given to AYAKA Hernandez   9/19/2018  7:28 AM      "

## 2018-09-19 NOTE — PLAN OF CARE
"Ate only milk and couple bites of cake for lunch. Denies GI issues. Has 'significant headache\"     Patient discharged at 3:25 PM via wheel chair accompanied by spouse and staff. Prescriptions - None ordered for discharge. All belongings sent with patient.     Discharge instructions reviewed with pt. Listed belongings gathered and returned to patient.     Patient discharged to home  Report called to n/a    Core Measures and Vaccines  Core Measures applicable during stay: No. If yes, state diagnosis:   Pneumonia and Influenza given prior to discharge, if indicated: N/A    Surgical Patient   Surgical Procedures during stay: n/a  Did patient receive discharge instruction on wound care and recognition of infection symptoms? N/A    MISC  Follow up appointment made:  Yes  Home and hospital aquired medications returned to patient: N/A  Patient reports pain was well managed at discharge: Yes    "

## 2018-09-19 NOTE — PROGRESS NOTES
Name: Cee Pereira    MRN#: [id number]    Reason for Hospitalization: LUQ abdominal pain [R10.12]  Partial small bowel obstruction [K56.600]    Discharge Date: 9/19/2018    Patient / Family response to discharge plan: Agree    Follow-Up Appt: No future appointments.    Other Providers (Care Coordinator, County Services, PCA services etc): No    Discharge Disposition: home  Greg to transport    Maria R Virk

## 2018-09-19 NOTE — PROGRESS NOTES
Checked in with Cee.  Denies questions or concerns at this time.  CTS will continue to remain available for support and resources. Hopes  to go home today.

## 2018-09-19 NOTE — PLAN OF CARE
[facility] Observation to Admission Note:    Patient status changed from Observation to Inpatient admission on 9/18/2018 at approximately 1553 PM. Admission required documentation completed. Will continue to monitor and document as needed.     Inpatient Nursing criteria below was met (In addition to previous Observation criteria listed):    Core Measure diagnosis present: No    Clergy visit ordered if patient requests: N/A - patient declines    Fall Prevention Low: Care plan updated, education given and documented, sticker and magnet in place: Yes    Care Plan updated to include appropriate Clinical Practice Guideline(s): Yes    Education Documented (including initial assessment): Yes    Patient has discharge needs : No

## 2018-09-19 NOTE — PLAN OF CARE
Problem: Patient Care Overview  Goal: Plan of Care/Patient Progress Review  diagnostic tests and consults completed and resulted   -vital signs normal or at patient baseline   Nurse to notify provider when observation goals have been met and patient is ready for discharge.   Outcome: No Change  Reason for hospital stay:  SBO    Pain Management:  Complained of headache pain - gave PO Tylenol and IV dilaudid x1 with effective pain relief.    Orientation:  A+O x4    Cardiac:  AP regular    Respiratory:  Lungs clear - sats 92% on RA - encouraged coughing and deep breathing    GI:  Bowel sounds hypoactive x4. Denied any nausea. Complained of abd pain at beginning of shift and gave GI Cocktail x1 at start of shift with no further complaints of abdominal pain. Started on carafate and IV protonix. Continues with NPO status except for meds.  Had small loose stool x1 and passing flatus    :  Voiding adequate amounts of urine in bathroom - was incontinent of urine x1 with strong odor noted.      IVF:  NS infusing at 125mL/hr    Mobility:  Up independently in room with steady gait.       Face to face report given with opportunity to observe patient.    Report given to Anais MARLEY    9/19/2018  12:47 AM  Jareth Xiao           09/19/18 0047   OTHER   Plan Of Care Reviewed With patient   Plan of Care Review   Progress improving       Problem: Bowel Obstruction (Adult)  Goal: Signs and Symptoms of Listed Potential Problems Will be Absent, Minimized or Managed (Bowel Obstruction)  Signs and symptoms of listed potential problems will be absent, minimized or managed by discharge/transition of care (reference Bowel Obstruction (Adult) CPG).  Outcome: Improving   09/19/18 0047   Bowel Obstruction   Problems Assessed (Bowel Obstruction) all   Problems Present (Bowel Obstruction) pain

## 2018-09-19 NOTE — PLAN OF CARE
Face to face report given with opportunity to observe patient.    Report given to AYAKA Templeton   9/18/2018  7:32 PM

## 2018-09-19 NOTE — ACP (ADVANCE CARE PLANNING)
Face to face report given with opportunity to observe patient.    Report given to AYAKA Hernandez SN  9/19/2018  11:58 AM

## 2018-09-20 ENCOUNTER — TELEPHONE (OUTPATIENT)
Dept: CASE MANAGEMENT | Facility: HOSPITAL | Age: 73
End: 2018-09-20

## 2018-09-20 NOTE — TELEPHONE ENCOUNTER
Cee Pereira, was discharged to home on 9/19   from [facility]. I spoke today with her regarding her  discharge.   She indicates she has receive(d) sufficient information upon discharge. Medications were reviewed in full on discharge, including:  medications to be continued from preadmission and any side effects. Prescriptions  - None received at discharge. Medications are being taken as prescribed.   She indicates she is waiting for a call back with the date/time of her follow up appointment.  She did not have any questions regarding discharge instructions or condition.  Per their request, the following employee (s) can be recognized for their outstanding services delivered:  Care was excellent but she did have a wait to get into the ER.   Suggestions to improve service: Nothing indicated.  She was informed she  may receive a survey in the mail from the hospital. Asked if she  would kindly complete the survey in order for [facility] to know if services fully met patient needs.

## 2019-05-04 ENCOUNTER — APPOINTMENT (OUTPATIENT)
Dept: CT IMAGING | Facility: HOSPITAL | Age: 74
DRG: 390 | End: 2019-05-04
Attending: INTERNAL MEDICINE
Payer: MEDICARE

## 2019-05-04 ENCOUNTER — APPOINTMENT (OUTPATIENT)
Dept: GENERAL RADIOLOGY | Facility: HOSPITAL | Age: 74
DRG: 390 | End: 2019-05-04
Attending: INTERNAL MEDICINE
Payer: MEDICARE

## 2019-05-04 ENCOUNTER — HOSPITAL ENCOUNTER (INPATIENT)
Facility: HOSPITAL | Age: 74
LOS: 3 days | Discharge: HOME | DRG: 390 | End: 2019-05-07
Attending: INTERNAL MEDICINE | Admitting: INTERNAL MEDICINE
Payer: MEDICARE

## 2019-05-04 DIAGNOSIS — K56.609 SMALL BOWEL OBSTRUCTION (H): ICD-10-CM

## 2019-05-04 PROBLEM — K21.9 GERD (GASTROESOPHAGEAL REFLUX DISEASE): Status: ACTIVE | Noted: 2018-09-18

## 2019-05-04 PROBLEM — E11.49 TYPE 2 DIABETES MELLITUS WITH NEUROLOGIC COMPLICATION (H): Status: ACTIVE | Noted: 2017-05-23

## 2019-05-04 PROBLEM — I10 HTN (HYPERTENSION): Status: ACTIVE | Noted: 2018-09-18

## 2019-05-04 LAB
ALBUMIN SERPL-MCNC: 3.9 G/DL (ref 3.4–5)
ALBUMIN UR-MCNC: 10 MG/DL
ALP SERPL-CCNC: 72 U/L (ref 40–150)
ALT SERPL W P-5'-P-CCNC: 32 U/L (ref 0–50)
ANION GAP SERPL CALCULATED.3IONS-SCNC: 7 MMOL/L (ref 3–14)
APPEARANCE UR: CLEAR
AST SERPL W P-5'-P-CCNC: 20 U/L (ref 0–45)
BACTERIA #/AREA URNS HPF: ABNORMAL /HPF
BASOPHILS # BLD AUTO: 0 10E9/L (ref 0–0.2)
BASOPHILS NFR BLD AUTO: 0.3 %
BILIRUB SERPL-MCNC: 0.4 MG/DL (ref 0.2–1.3)
BILIRUB UR QL STRIP: NEGATIVE
BUN SERPL-MCNC: 24 MG/DL (ref 7–30)
CALCIUM SERPL-MCNC: 9.5 MG/DL (ref 8.5–10.1)
CHLORIDE SERPL-SCNC: 102 MMOL/L (ref 94–109)
CO2 SERPL-SCNC: 28 MMOL/L (ref 20–32)
COLOR UR AUTO: YELLOW
CREAT SERPL-MCNC: 1.15 MG/DL (ref 0.52–1.04)
DIFFERENTIAL METHOD BLD: ABNORMAL
EOSINOPHIL # BLD AUTO: 0.1 10E9/L (ref 0–0.7)
EOSINOPHIL NFR BLD AUTO: 1.1 %
ERYTHROCYTE [DISTWIDTH] IN BLOOD BY AUTOMATED COUNT: 13.1 % (ref 10–15)
GFR SERPL CREATININE-BSD FRML MDRD: 47 ML/MIN/{1.73_M2}
GLUCOSE BLDC GLUCOMTR-MCNC: 103 MG/DL (ref 70–99)
GLUCOSE BLDC GLUCOMTR-MCNC: 149 MG/DL (ref 70–99)
GLUCOSE BLDC GLUCOMTR-MCNC: 92 MG/DL (ref 70–99)
GLUCOSE SERPL-MCNC: 133 MG/DL (ref 70–99)
GLUCOSE UR STRIP-MCNC: NEGATIVE MG/DL
HCT VFR BLD AUTO: 43.7 % (ref 35–47)
HGB BLD-MCNC: 14.9 G/DL (ref 11.7–15.7)
HGB UR QL STRIP: NEGATIVE
HYALINE CASTS #/AREA URNS LPF: 1 /LPF
IMM GRANULOCYTES # BLD: 0 10E9/L (ref 0–0.4)
IMM GRANULOCYTES NFR BLD: 0.3 %
KETONES UR STRIP-MCNC: NEGATIVE MG/DL
LACTATE BLD-SCNC: 1.3 MMOL/L (ref 0.7–2)
LEUKOCYTE ESTERASE UR QL STRIP: ABNORMAL
LYMPHOCYTES # BLD AUTO: 2.7 10E9/L (ref 0.8–5.3)
LYMPHOCYTES NFR BLD AUTO: 22.7 %
MCH RBC QN AUTO: 28.5 PG (ref 26.5–33)
MCHC RBC AUTO-ENTMCNC: 34.1 G/DL (ref 31.5–36.5)
MCV RBC AUTO: 84 FL (ref 78–100)
MONOCYTES # BLD AUTO: 0.7 10E9/L (ref 0–1.3)
MONOCYTES NFR BLD AUTO: 6.2 %
MUCOUS THREADS #/AREA URNS LPF: PRESENT /LPF
NEUTROPHILS # BLD AUTO: 8.3 10E9/L (ref 1.6–8.3)
NEUTROPHILS NFR BLD AUTO: 69.4 %
NITRATE UR QL: NEGATIVE
NRBC # BLD AUTO: 0 10*3/UL
NRBC BLD AUTO-RTO: 0 /100
PH UR STRIP: 5.5 PH (ref 4.7–8)
PLATELET # BLD AUTO: 138 10E9/L (ref 150–450)
POTASSIUM SERPL-SCNC: 3.7 MMOL/L (ref 3.4–5.3)
PROT SERPL-MCNC: 8.2 G/DL (ref 6.8–8.8)
RBC # BLD AUTO: 5.23 10E12/L (ref 3.8–5.2)
RBC #/AREA URNS AUTO: 5 /HPF (ref 0–2)
SODIUM SERPL-SCNC: 137 MMOL/L (ref 133–144)
SOURCE: ABNORMAL
SP GR UR STRIP: 1.03 (ref 1–1.03)
SQUAMOUS #/AREA URNS AUTO: 5 /HPF (ref 0–1)
UROBILINOGEN UR STRIP-MCNC: NORMAL MG/DL (ref 0–2)
WBC # BLD AUTO: 12 10E9/L (ref 4–11)
WBC #/AREA URNS AUTO: 52 /HPF (ref 0–5)

## 2019-05-04 PROCEDURE — 83605 ASSAY OF LACTIC ACID: CPT | Performed by: INTERNAL MEDICINE

## 2019-05-04 PROCEDURE — 25000128 H RX IP 250 OP 636: Performed by: INTERNAL MEDICINE

## 2019-05-04 PROCEDURE — 74176 CT ABD & PELVIS W/O CONTRAST: CPT | Mod: TC

## 2019-05-04 PROCEDURE — 96375 TX/PRO/DX INJ NEW DRUG ADDON: CPT

## 2019-05-04 PROCEDURE — 87086 URINE CULTURE/COLONY COUNT: CPT | Performed by: INTERNAL MEDICINE

## 2019-05-04 PROCEDURE — 25800030 ZZH RX IP 258 OP 636: Performed by: INTERNAL MEDICINE

## 2019-05-04 PROCEDURE — A9270 NON-COVERED ITEM OR SERVICE: HCPCS | Performed by: INTERNAL MEDICINE

## 2019-05-04 PROCEDURE — 25000132 ZZH RX MED GY IP 250 OP 250 PS 637: Performed by: INTERNAL MEDICINE

## 2019-05-04 PROCEDURE — 85025 COMPLETE CBC W/AUTO DIFF WBC: CPT | Performed by: INTERNAL MEDICINE

## 2019-05-04 PROCEDURE — 74018 RADEX ABDOMEN 1 VIEW: CPT | Mod: TC

## 2019-05-04 PROCEDURE — 96374 THER/PROPH/DIAG INJ IV PUSH: CPT

## 2019-05-04 PROCEDURE — 40000786 ZZHCL STATISTIC ACTIVE MRSA SURVEILLANCE CULTURE: Performed by: INTERNAL MEDICINE

## 2019-05-04 PROCEDURE — 25000128 H RX IP 250 OP 636

## 2019-05-04 PROCEDURE — 00000146 ZZHCL STATISTIC GLUCOSE BY METER IP

## 2019-05-04 PROCEDURE — 96361 HYDRATE IV INFUSION ADD-ON: CPT

## 2019-05-04 PROCEDURE — 99285 EMERGENCY DEPT VISIT HI MDM: CPT | Mod: 25

## 2019-05-04 PROCEDURE — 12000000 ZZH R&B MED SURG/OB

## 2019-05-04 PROCEDURE — 99285 EMERGENCY DEPT VISIT HI MDM: CPT | Mod: Z6 | Performed by: INTERNAL MEDICINE

## 2019-05-04 PROCEDURE — 80053 COMPREHEN METABOLIC PANEL: CPT | Performed by: INTERNAL MEDICINE

## 2019-05-04 PROCEDURE — 99222 1ST HOSP IP/OBS MODERATE 55: CPT | Mod: AI | Performed by: INTERNAL MEDICINE

## 2019-05-04 PROCEDURE — 81001 URINALYSIS AUTO W/SCOPE: CPT | Performed by: INTERNAL MEDICINE

## 2019-05-04 RX ORDER — MAGNESIUM SULFATE HEPTAHYDRATE 40 MG/ML
4 INJECTION, SOLUTION INTRAVENOUS EVERY 4 HOURS PRN
Status: DISCONTINUED | OUTPATIENT
Start: 2019-05-04 | End: 2019-05-07 | Stop reason: HOSPADM

## 2019-05-04 RX ORDER — MORPHINE SULFATE 4 MG/ML
4 INJECTION, SOLUTION INTRAMUSCULAR; INTRAVENOUS ONCE
Status: COMPLETED | OUTPATIENT
Start: 2019-05-04 | End: 2019-05-04

## 2019-05-04 RX ORDER — MORPHINE SULFATE 4 MG/ML
2-4 INJECTION, SOLUTION INTRAMUSCULAR; INTRAVENOUS
Status: DISCONTINUED | OUTPATIENT
Start: 2019-05-04 | End: 2019-05-04

## 2019-05-04 RX ORDER — ONDANSETRON 2 MG/ML
4 INJECTION INTRAMUSCULAR; INTRAVENOUS ONCE
Status: COMPLETED | OUTPATIENT
Start: 2019-05-04 | End: 2019-05-04

## 2019-05-04 RX ORDER — POTASSIUM CL/LIDO/0.9 % NACL 10MEQ/0.1L
10 INTRAVENOUS SOLUTION, PIGGYBACK (ML) INTRAVENOUS
Status: DISCONTINUED | OUTPATIENT
Start: 2019-05-04 | End: 2019-05-07 | Stop reason: HOSPADM

## 2019-05-04 RX ORDER — SODIUM CHLORIDE 9 MG/ML
INJECTION, SOLUTION INTRAVENOUS CONTINUOUS
Status: DISCONTINUED | OUTPATIENT
Start: 2019-05-04 | End: 2019-05-04

## 2019-05-04 RX ORDER — DEXTROSE MONOHYDRATE, SODIUM CHLORIDE, AND POTASSIUM CHLORIDE 50; .745; 4.5 G/1000ML; G/1000ML; G/1000ML
INJECTION, SOLUTION INTRAVENOUS CONTINUOUS
Status: DISPENSED | OUTPATIENT
Start: 2019-05-04 | End: 2019-05-05

## 2019-05-04 RX ORDER — CEFTRIAXONE SODIUM 1 G/50ML
1 INJECTION, SOLUTION INTRAVENOUS ONCE
Status: COMPLETED | OUTPATIENT
Start: 2019-05-04 | End: 2019-05-04

## 2019-05-04 RX ORDER — KETOROLAC TROMETHAMINE 15 MG/ML
15 INJECTION, SOLUTION INTRAMUSCULAR; INTRAVENOUS EVERY 6 HOURS
Status: DISCONTINUED | OUTPATIENT
Start: 2019-05-04 | End: 2019-05-07

## 2019-05-04 RX ORDER — NALOXONE HYDROCHLORIDE 0.4 MG/ML
.1-.4 INJECTION, SOLUTION INTRAMUSCULAR; INTRAVENOUS; SUBCUTANEOUS
Status: DISCONTINUED | OUTPATIENT
Start: 2019-05-04 | End: 2019-05-07 | Stop reason: HOSPADM

## 2019-05-04 RX ORDER — MORPHINE SULFATE 4 MG/ML
INJECTION, SOLUTION INTRAMUSCULAR; INTRAVENOUS
Status: COMPLETED
Start: 2019-05-04 | End: 2019-05-04

## 2019-05-04 RX ORDER — POTASSIUM CHLORIDE 7.45 MG/ML
10 INJECTION INTRAVENOUS
Status: DISCONTINUED | OUTPATIENT
Start: 2019-05-04 | End: 2019-05-07 | Stop reason: HOSPADM

## 2019-05-04 RX ORDER — ONDANSETRON 2 MG/ML
4 INJECTION INTRAMUSCULAR; INTRAVENOUS EVERY 6 HOURS PRN
Status: DISCONTINUED | OUTPATIENT
Start: 2019-05-04 | End: 2019-05-07 | Stop reason: HOSPADM

## 2019-05-04 RX ORDER — ONDANSETRON 4 MG/1
4 TABLET, ORALLY DISINTEGRATING ORAL EVERY 6 HOURS PRN
Status: DISCONTINUED | OUTPATIENT
Start: 2019-05-04 | End: 2019-05-07 | Stop reason: HOSPADM

## 2019-05-04 RX ORDER — MORPHINE SULFATE 4 MG/ML
2-4 INJECTION, SOLUTION INTRAMUSCULAR; INTRAVENOUS
Status: DISCONTINUED | OUTPATIENT
Start: 2019-05-04 | End: 2019-05-07 | Stop reason: HOSPADM

## 2019-05-04 RX ORDER — POTASSIUM CHLORIDE 1.5 G/1.58G
20-40 POWDER, FOR SOLUTION ORAL
Status: DISCONTINUED | OUTPATIENT
Start: 2019-05-04 | End: 2019-05-07 | Stop reason: HOSPADM

## 2019-05-04 RX ORDER — PROCHLORPERAZINE 25 MG
12.5 SUPPOSITORY, RECTAL RECTAL EVERY 12 HOURS PRN
Status: DISCONTINUED | OUTPATIENT
Start: 2019-05-04 | End: 2019-05-07 | Stop reason: HOSPADM

## 2019-05-04 RX ORDER — CEFTRIAXONE SODIUM 1 G/50ML
1 INJECTION, SOLUTION INTRAVENOUS EVERY 24 HOURS
Status: DISCONTINUED | OUTPATIENT
Start: 2019-05-05 | End: 2019-05-05

## 2019-05-04 RX ORDER — POTASSIUM CHLORIDE 1500 MG/1
20-40 TABLET, EXTENDED RELEASE ORAL
Status: DISCONTINUED | OUTPATIENT
Start: 2019-05-04 | End: 2019-05-07 | Stop reason: HOSPADM

## 2019-05-04 RX ORDER — ACETAMINOPHEN 650 MG/1
650 SUPPOSITORY RECTAL EVERY 4 HOURS PRN
Status: DISCONTINUED | OUTPATIENT
Start: 2019-05-04 | End: 2019-05-07

## 2019-05-04 RX ORDER — METOPROLOL TARTRATE 25 MG/1
25 TABLET, FILM COATED ORAL DAILY
Status: DISCONTINUED | OUTPATIENT
Start: 2019-05-04 | End: 2019-05-07 | Stop reason: HOSPADM

## 2019-05-04 RX ORDER — PROCHLORPERAZINE MALEATE 5 MG
5 TABLET ORAL EVERY 6 HOURS PRN
Status: DISCONTINUED | OUTPATIENT
Start: 2019-05-04 | End: 2019-05-07 | Stop reason: HOSPADM

## 2019-05-04 RX ADMIN — ENOXAPARIN SODIUM 40 MG: 40 INJECTION SUBCUTANEOUS at 05:45

## 2019-05-04 RX ADMIN — MORPHINE SULFATE 2 MG: 4 INJECTION INTRAVENOUS at 05:43

## 2019-05-04 RX ADMIN — SODIUM CHLORIDE 500 ML: 9 INJECTION, SOLUTION INTRAVENOUS at 00:46

## 2019-05-04 RX ADMIN — MORPHINE SULFATE 4 MG: 4 INJECTION INTRAVENOUS at 00:46

## 2019-05-04 RX ADMIN — MORPHINE SULFATE 4 MG: 4 INJECTION INTRAVENOUS at 16:18

## 2019-05-04 RX ADMIN — ONDANSETRON 4 MG: 2 INJECTION INTRAMUSCULAR; INTRAVENOUS at 02:47

## 2019-05-04 RX ADMIN — SODIUM CHLORIDE: 9 INJECTION, SOLUTION INTRAVENOUS at 04:19

## 2019-05-04 RX ADMIN — METOPROLOL TARTRATE 25 MG: 25 TABLET, FILM COATED ORAL at 16:20

## 2019-05-04 RX ADMIN — MORPHINE SULFATE 4 MG: 4 INJECTION INTRAVENOUS at 02:48

## 2019-05-04 RX ADMIN — KETOROLAC TROMETHAMINE 15 MG: 15 INJECTION, SOLUTION INTRAMUSCULAR; INTRAVENOUS at 20:19

## 2019-05-04 RX ADMIN — MORPHINE SULFATE 4 MG: 4 INJECTION, SOLUTION INTRAMUSCULAR; INTRAVENOUS at 02:48

## 2019-05-04 RX ADMIN — MORPHINE SULFATE 4 MG: 4 INJECTION INTRAVENOUS at 08:02

## 2019-05-04 RX ADMIN — CEFTRIAXONE SODIUM 1 G: 1 INJECTION, SOLUTION INTRAVENOUS at 03:04

## 2019-05-04 RX ADMIN — SODIUM CHLORIDE: 9 INJECTION, SOLUTION INTRAVENOUS at 16:12

## 2019-05-04 RX ADMIN — MORPHINE SULFATE 2 MG: 4 INJECTION INTRAVENOUS at 06:18

## 2019-05-04 RX ADMIN — ONDANSETRON 4 MG: 2 INJECTION INTRAMUSCULAR; INTRAVENOUS at 00:46

## 2019-05-04 RX ADMIN — SODIUM CHLORIDE: 9 INJECTION, SOLUTION INTRAVENOUS at 06:18

## 2019-05-04 ASSESSMENT — ENCOUNTER SYMPTOMS
ARTHRALGIAS: 0
SHORTNESS OF BREATH: 0
ABDOMINAL DISTENTION: 0
FLANK PAIN: 0
LIGHT-HEADEDNESS: 0
COUGH: 0
CHEST TIGHTNESS: 0
HEMATURIA: 0
CONFUSION: 0
HEADACHES: 0
BACK PAIN: 0
ABDOMINAL PAIN: 1
PALPITATIONS: 0
VOMITING: 1
WOUND: 0
ANAL BLEEDING: 0
NECK PAIN: 0
NAUSEA: 1
DYSURIA: 0
MYALGIAS: 0
DIZZINESS: 0
COLOR CHANGE: 0
FEVER: 0
NUMBNESS: 0
BLOOD IN STOOL: 0
WHEEZING: 0
CHILLS: 0
DIAPHORESIS: 0
NECK STIFFNESS: 0
VOICE CHANGE: 0

## 2019-05-04 ASSESSMENT — MIFFLIN-ST. JEOR: SCORE: 1325.75

## 2019-05-04 ASSESSMENT — ACTIVITIES OF DAILY LIVING (ADL)
ADLS_ACUITY_SCORE: 12
ADLS_ACUITY_SCORE: 13

## 2019-05-04 NOTE — PROGRESS NOTES
[facility]  Hospitalist Progress Note          Assessment and Plan:   Cee Pereira is a 73 year old female who presents with abdominal pain since 6 pm night prior to admission, diagnosed with SBO and will be admitted for bowel rest, pain control.   Also has UTI and will be treated with Rocephin     Active Problems:    Small bowel obstruction (H)    Assessment: This is ongoing problem. Pt has h/o multiple surgeries and about once per year she has Bowel obstruction. Could be triggered by UTI this time. Usually stays 1-2 days. Meets inpatient criteria (imaging diagnosed complete SBO, but may improve sooner)    Plan:               NPO but meds               ml/hr              Morphine for pain.               Patient agrees to an NG tube at this time              If not improving as expected, will consult surgery        UTI:  Awaiting urine culture.  Rocephin started.       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)    Assessment: treated with insulin, not sure how well controlled. Admission glycemia 133.     Plan: will give only fraction of her Tresiba and will use only sliding scale for now.        GERD (gastroesophageal reflux disease)    Assessment: present on admission. Treated with PPI.     Plan: Hold PPI. If sx returns, will give IV       HTN (hypertension)    Assessment: has h/o essential HTN. Only beta blocker (25 mg po daily of metoprolol) on he home med list.     Plan: Here will use vasotec IV q 6 hr. May need addition to her BB when she goes home.         DVT Prophylaxis: Enoxaparin (Lovenox) SQ  Code Status: Full Code     Disposition: Expected discharge in 1-2 days once SBO resolve. May improve sooner and will not require 2 midnights stay. But she meets inpatient criteria for admission (documented SBO).                  Interval History:   Patient with continued abdominal distention and pain.                Medications:       [START ON 5/5/2019] cefTRIAXone  1 g  "Intravenous Q24H     enoxaparin  40 mg Subcutaneous Q24H     metoprolol tartrate  25 mg Oral Daily                  Physical Exam:     Vitals:    19 0100 19 0316 19 0333 19 0750   BP: 172/88 159/86 156/72 155/83   Pulse: 78 86     Resp: 16 18 18 20   Temp:   97.8  F (36.6  C) 97.4  F (36.3  C)   TempSrc:   Tympanic Temporal   SpO2: 97% 95% 96% 93%   Weight:   79.9 kg (176 lb 2.4 oz)    Height:   1.676 m (5' 6\")          Vital Sign Ranges  Temperature Temp  Av.6  F (36.4  C)  Min: 97.4  F (36.3  C)  Max: 97.8  F (36.6  C)   Blood pressure Systolic (24hrs), Av , Min:155 , Max:185        Diastolic (24hrs), Av, Min:72, Max:106      Pulse Pulse  Av.7  Min: 78  Max: 86   Respirations Resp  Av  Min: 16  Max: 20   Pulse oximetry SpO2  Av.8 %  Min: 93 %  Max: 98 %         Intake/Output Summary (Last 24 hours) at 2019 0928  Last data filed at 2019 0900  Gross per 24 hour   Intake 779 ml   Output 800 ml   Net -21 ml     GENERAL: Patient is awake, alert, oriented x 4 and in no distress. Family at bedside.   CARDIAC: RRR, S1,S2 present, no S3. No murmurs/gallops or rubs.   PULMONARY: not labored breathing, no accessory muscle use. Clear breath sounds bilaterally.   GI: distended, diffusely tender. Multiple scars. Hyperactive bowel sounds.   MS:  no synovitis, no sarcopenia  NEUROLOGICAL: not done, not indicated.   PSYCHIATRIC: normal affect, normal mood.  INTEGUMENT: intact, no rash, no ulcerations.      Peripheral IV 19 Left Upper forearm (Active)   Site Assessment WDL 2019  8:05 AM   Line Status Infusing 2019  8:05 AM   Phlebitis Scale 0-->no symptoms 2019  8:05 AM   Infiltration Scale 0 2019  8:05 AM   Number of days: 0       NG/OG Tube Nasogastric Left nostril (Active)   Site Description WDL 2019  9:00 AM   Status Open to gravity drainage 2019  9:00 AM   Drainage Appearance Yellow;Tan 2019  9:00 AM   Placement Check Aspiration of " gastric content 5/4/2019  9:00 AM   [place]on (cm marking) at nare/mouth 63 cm 5/4/2019  9:00 AM   Output (ml) 600 ml 5/4/2019  9:00 AM   Number of days: 0     No line/device             Data:     Lab Results   Component Value Date    WBC 12.0 (H) 05/04/2019    HGB 14.9 05/04/2019    HCT 43.7 05/04/2019     (L) 05/04/2019     05/04/2019    POTASSIUM 3.7 05/04/2019    CHLORIDE 102 05/04/2019    CO2 28 05/04/2019    BUN 24 05/04/2019    CR 1.15 (H) 05/04/2019     (H) 05/04/2019    SED 11 12/29/2016    DIMER 204 04/22/2018    NTBNPI 56 04/22/2018    TROPI <0.015 09/18/2018    AST 20 05/04/2019    ALT 32 05/04/2019    ALKPHOS 72 05/04/2019    BILITOTAL 0.4 05/04/2019    INR 1.12 08/17/2016     Lactic Acid   Date Value Ref Range Status   05/04/2019 1.3 0.7 - 2.0 mmol/L Final   09/19/2018 0.7 0.4 - 2.0 mmol/L Final   09/18/2018 1.2 0.4 - 2.0 mmol/L Final       Ramón Batista, DO

## 2019-05-04 NOTE — H&P
[facility]    History and Physical  Hospitalist       Date of Admission:  5/4/2019    Assessment & Plan   Cee Pereira is a 73 year old female who presents with abdominal pain since 6 pm night prior to admission, diagnosed with SBO and will be admitted for bowel rest, pain control.   Also has UTI and will be treated with Rocephin    Active Problems:    Small bowel obstruction (H)    Assessment: This is ongoing problem. Pt has h/o multiple surgeries and about once per year she has Bowel obstruction. Could be triggered by UTI this time. Usually stays 1-2 days. Meets inpatient criteria (imaging diagnosed complete SBO, but may improve sooner)    Plan:    NPO but meds    ml/hr   Morphine for pain.    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 Admission glycemia 133.     Plan: will give only fraction of her Tresiba and will use only sliding scale for now.       GERD (gastroesophageal reflux disease)    Assessment: present on admission. Treated with PPI.     Plan: Hold PPI. If sx returns, will give IV      HTN (hypertension)    Assessment: has h/o essential HTN. Only beta blocker (25 mg po daily of metoprolol) on he home med list.     Plan: Here will use vasotec IV q 6 hr. May need addition to her BB when she goes home.       DVT Prophylaxis: Enoxaparin (Lovenox) SQ  Code Status: Full Code    Disposition: Expected discharge in 1-2 days once SBO resolve. May improve sooner and will not require 2 midnights stay. But she meets inpatient criteria for admission (documented SBO). Also has UTI.     Jesus Hdez    Primary Care Physician   Matthew Villela    Chief Complaint abdominal pain    Reason for Admission: SBO, UTI, cystitis.     History is obtained from the patient, electronic health record, emergency department physician and patient's spouse    History of  Present Illness   Cee Pereira is a 73 year old female who presents with abdominal distention, pain, nausea which started 6 PM the night prior to admission. H/o multiple abdominal surgeries and many times was admitted for SBO. Last admission was 9.2018 and stayed 2 days.   Denies nausea, passing flatus. No vomiting. Denies dysuria. Pain diffuse, max 7/10, currently 2/10 (recently given IV meds). Pt stated that her all colon was removed.     ED events: stable.     ED treatment; Rocephin, morphine and IVF    ED imaging studies: CT abdomen confirmed SBO        Past Medical History    I have reviewed this patient's medical history and updated it with pertinent information if needed.   Past Medical History:   Diagnosis Date     Diabetes mellitus (H)      Diverticulitis      Herniated lumbar intervertebral disc        Past Surgical History   I have reviewed this patient's surgical history and updated it with pertinent information if needed.  Past Surgical History:   Procedure Laterality Date     APPENDECTOMY OPEN       CHOLECYSTECTOMY       COLECTOMY WITHOUT COLOSTOMY      for diverticulitis     DECOMPRESSION LUMBAR ONE LEVEL  7/31/2013    L4-L5     JOINT REPLACEMENT      bilateral hip replacements       Prior to Admission Medications   Prior to Admission Medications   Prescriptions Last Dose Informant Patient Reported? Taking?   Coenzyme Q10 (COQ-10 PO) 5/2/2019 at Unknown time  Yes Yes   Sig: Take 100 mg by mouth daily   GABAPENTIN PO 5/2/2019 at Unknown time  Yes Yes   Sig: Take 300 mg by mouth 2 times daily AM, second dose at 1200   GABAPENTIN PO 5/2/2019 at Unknown time  Yes Yes   Sig: Take 600 mg by mouth At Bedtime   METOPROLOL TARTRATE PO 5/2/2019 at Unknown time  Yes Yes   Sig: Take 25 mg by mouth daily    PANTOPRAZOLE SODIUM PO 5/2/2019 at Unknown time  Yes Yes   Sig: Take 40 mg by mouth every morning (before breakfast)   SIMVASTATIN PO 5/2/2019 at Unknown time  Yes Yes   Sig: Take 10 mg by mouth daily    aspirin-dipyridamole (AGGRENOX)  MG per 12 hr capsule 5/2/2019 at Unknown time  Yes Yes   Sig: Take 1 capsule by mouth daily   insulin degludec (TRESIBA FLEXTOUCH) 100 UNIT/ML pen 5/3/2019 at Unknown time  Yes Yes   Sig: Inject 24 Units Subcutaneous At Bedtime       Facility-Administered Medications: None     Allergies   Allergies   Allergen Reactions     Diatrizoate      Other reaction(s): *Unknown     Iodides      Other reaction(s): *Unknown  Tincture     Ivp Dye [Contrast Dye]      Anaphylaxis     Lortab [Hydrocodone-Acetaminophen] Itching     headaches     No Clinical Screening - See Comments Itching     Pioglitazone Itching       Social History   I have reviewed this patient's social history and updated it with pertinent information if needed. Cee Pereira  reports that she has quit smoking. She has never used smokeless tobacco. She reports that she does not drink alcohol.    Family History   I have reviewed this patient's family history and updated it with pertinent information if needed.   No family history on file.    Review of Systems   All 14 Systems were reviewed and found to be negative except what's mentioned in HPI.     Physical Exam   Temp: 97.7  F (36.5  C) Temp src: Tympanic BP: 172/88 Pulse: 78   Resp: 16 SpO2: 97 % O2 Device: None (Room air)    Vital Signs with Ranges  Temp:  [97.7  F (36.5  C)] 97.7  F (36.5  C)  Pulse:  [78-81] 78  Resp:  [16-18] 16  BP: (172-185)/() 172/88  SpO2:  [97 %-98 %] 97 %  0 lbs 0 oz    Physical exam:   GENERAL: Patient is awake, alert, oriented x 4 and in no distress. Family at bedside.   HEENT: NC/AT. MM dry. OP clear.  Conjunctivae not pale, EOMI,  fundoscopic exam reveals clear optic nerve disc margins. Tympanic membranes intact.   CHEST: symmetrical and not tender on palpation. Both sides moves symmetrically with inspiration.   CARDIAC: PMI not displaced, S1,S2 present, no S3. No murmurs/gallops or rubs. No pulsatile masses. Peripheral pulses  easily palpable. No bruits in the neck. No peripheral edema.    PULMONARY: not labored breathing, no accessory muscle use. Clear breath sounds bilaterally.   GI: distended, diffusely tender. Multiple scars. Hyperactive bowel sounds.   : CVA not tender, bladder not palpable.  MS: Major joints without effusion, full range of motion, no synovitis, no sarcopenia  NEUROLOGICAL: not done, not indicated.   PSYCHIATRIC: normal affect, normal mood.  INTEGUMENT: intact, no rash, no ulcerations.  LYMPHATIC/HEMATOLOGIC: no LAD neck, axilla, and groin. No petechiae, no ecchymoses.       Goals of care were discussed with the patient and family which included but not limited to anticipated treatment course during the current hospitalization, recovery from current event, discharge planning and transitions of care responsibilities and expected outcomes. Code status was addressed on admission as well. End of life care discussion not done.    Data   Data reviewed today:  CT abdomen results reported by radiology: preliminary (virtual radiology report is SBO).   Recent Labs   Lab 05/04/19  0048   WBC 12.0*   HGB 14.9   MCV 84   *      POTASSIUM 3.7   CHLORIDE 102   CO2 28   BUN 24   CR 1.15*   ANIONGAP 7   ELVIS 9.5   *   ALBUMIN 3.9   PROTTOTAL 8.2   BILITOTAL 0.4   ALKPHOS 72   ALT 32   AST 20       No results found for this or any previous visit (from the past 24 hour(s)).

## 2019-05-04 NOTE — PLAN OF CARE
VSS, afebrile, HRR, lungs clear, bowels active, NG in place to gravity,very minimal output since placed. NG had 600 mL out when initially placed of tan/yellow thin fluid with chunks. Pt denies pain and nausea since NG placement. Pt does have a slight bloody left nares from NG placement. BG this morning 149 and this afternoon is 103. MD aware of BG and bloody nose. Pt is alert and oriented x 4, makes her needs known, up to the bathroom SBA. NPO, except ICE chips and meds.

## 2019-05-04 NOTE — PHARMACY
[facility]    Pharmacy      Antimicrobial Stewardship Note     Current antimicrobial therapy:  Anti-infectives (From now, onward)    Start     Dose/Rate Route Frequency Ordered Stop    05/05/19 0300  cefTRIAXone in d5w (ROCEPHIN) intermittent infusion 1 g      1 g  over 30 Minutes Intravenous EVERY 24 HOURS 05/04/19 0345            Indication: UTI    Days of Therapy: 1     Pertinent labs:  Creatinine   Creatinine   Date Value Ref Range Status   05/04/2019 1.15 (H) 0.52 - 1.04 mg/dL Final   09/19/2018 1.04 0.52 - 1.04 mg/dL Final   09/18/2018 1.21 (H) 0.52 - 1.04 mg/dL Final     WBC   WBC   Date Value Ref Range Status   05/04/2019 12.0 (H) 4.0 - 11.0 10e9/L Final   09/19/2018 8.8 4.0 - 11.0 10e9/L Final   09/18/2018 15.8 (H) 4.0 - 11.0 10e9/L Final     Procalcitonin No results found for: PCAL  CRP   CRP Inflammation   Date Value Ref Range Status   09/17/2018 <2.9 0.0 - 8.0 mg/L Final   03/20/2017 12.8 (H) 0.0 - 8.0 mg/L Final   12/29/2016 <2.9 0.0 - 8.0 mg/L Final       Culture Results:   -Day Micro Results     Procedure Component Value Units Date/Time   Active MRSA Surveillance Culture [[id number]] Collected: 05/04/19 0335   Order Status: Completed Lab Status: Preliminary result Updated: 05/04/19 0616   Specimen: Swab from Nose     Specimen Description Swab    Culture Micro Culture in progress   Urine Culture Aerobic Bacterial [[id number]] Collected: 05/04/19 0128   Order Status: Completed Lab Status: Preliminary result Updated: 05/04/19 0909   Specimen: Midstream Urine     Specimen Description Midstream Urine    Culture Micro Culture in progress       Recommendations/Interventions:  1. Awaiting urine culture. No recommendations at this time. Will continue to monitor.     Ethel Zepeda [facility]  May 4, 2019

## 2019-05-04 NOTE — PROVIDER NOTIFICATION
Dr. Batista notified NG placed 0900, 600 mL yellow, tan, chunky results. Pt states stomach feels slightly better.

## 2019-05-04 NOTE — ED PROVIDER NOTES
History     Chief Complaint   Patient presents with     Abdominal Pain     The history is provided by the patient.   Abdominal Pain   Pain location:  Generalized  Pain quality: cramping    Pain severity:  Severe  Onset quality:  Sudden  Duration:  7 hours  Timing:  Constant  Progression:  Unchanged  Chronicity:  Recurrent  Associated symptoms: nausea and vomiting    Associated symptoms: no chest pain, no chills, no cough, no dysuria, no fever, no hematuria and no shortness of breath        Allergies:  Allergies   Allergen Reactions     Diatrizoate      Other reaction(s): *Unknown     Iodides      Other reaction(s): *Unknown  Tincture     Ivp Dye [Contrast Dye]      Anaphylaxis     Lortab [Hydrocodone-Acetaminophen] Itching     headaches     No Clinical Screening - See Comments Itching     Pioglitazone Itching       Problem List:    Patient Active Problem List    Diagnosis Date Noted     LUQ abdominal pain 09/18/2018     Priority: Medium     GERD (gastroesophageal reflux disease) 09/18/2018     Priority: Medium     HTN (hypertension) 09/18/2018     Priority: Medium     Hyperlipidemia 09/18/2018     Priority: Medium     Viral gastroenteritis 05/23/2017     Priority: Medium     Dehydration 05/23/2017     Priority: Medium     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) 05/23/2017     Priority: Medium     Crohn's disease of large intestine without complication (H) 05/23/2017     Priority: Medium     Gastroenteritis 05/23/2017     Priority: Medium     Small bowel obstruction (H) 08/17/2016     Priority: Medium     SBO (small bowel obstruction) (H) 08/17/2016     Priority: Medium     Lumbar stenosis 07/30/2013     Priority: Medium     Lumbar disc herniation with radiculopathy 08/03/2012     Priority: Medium        Past Medical History:    Past Medical History:   Diagnosis Date     Diabetes mellitus (H)      Diverticulitis      Herniated lumbar intervertebral disc        Past Surgical History:    Past  Surgical History:   Procedure Laterality Date     APPENDECTOMY OPEN       CHOLECYSTECTOMY       COLECTOMY WITHOUT COLOSTOMY      for diverticulitis     DECOMPRESSION LUMBAR ONE LEVEL  7/31/2013    L4-L5     JOINT REPLACEMENT      bilateral hip replacements       Family History:    No family history on file.    Social History:  Marital Status:   [2]  Social History     Tobacco Use     Smoking status: Former Smoker     Smokeless tobacco: Never Used   Substance Use Topics     Alcohol use: No     Drug use: Not on file        Medications:      aspirin-dipyridamole (AGGRENOX)  MG per 12 hr capsule   Coenzyme Q10 (COQ-10 PO)   GABAPENTIN PO   GABAPENTIN PO   insulin degludec (TRESIBA FLEXTOUCH) 100 UNIT/ML pen   METOPROLOL TARTRATE PO   PANTOPRAZOLE SODIUM PO   SIMVASTATIN PO         Review of Systems   Constitutional: Negative for chills, diaphoresis and fever.   HENT: Negative for voice change.    Eyes: Negative for visual disturbance.   Respiratory: Negative for cough, chest tightness, shortness of breath and wheezing.    Cardiovascular: Negative for chest pain, palpitations and leg swelling.   Gastrointestinal: Positive for abdominal pain, nausea and vomiting. Negative for abdominal distention, anal bleeding and blood in stool.   Genitourinary: Negative for decreased urine volume, dysuria, flank pain and hematuria.   Musculoskeletal: Negative for arthralgias, back pain, gait problem, myalgias, neck pain and neck stiffness.   Skin: Negative for color change, pallor, rash and wound.   Neurological: Negative for dizziness, syncope, light-headedness, numbness and headaches.   Psychiatric/Behavioral: Negative for confusion and suicidal ideas.       Physical Exam   BP: (!) 185/106  Pulse: 81  Temp: 97.7  F (36.5  C)  Resp: 18  SpO2: 98 %      Physical Exam   Constitutional: She is oriented to person, place, and time. She appears well-developed and well-nourished.   HENT:   Head: Normocephalic and atraumatic.    Mouth/Throat: No oropharyngeal exudate.   Eyes: Pupils are equal, round, and reactive to light. Conjunctivae are normal.   Neck: Normal range of motion. Neck supple. No JVD present. No tracheal deviation present. No thyromegaly present.   Cardiovascular: Normal rate, regular rhythm, normal heart sounds and intact distal pulses. Exam reveals no gallop and no friction rub.   No murmur heard.  Pulmonary/Chest: Effort normal and breath sounds normal. No stridor. No respiratory distress. She has no wheezes. She has no rales. She exhibits no tenderness.   Abdominal: She exhibits distension. She exhibits no mass. Bowel sounds are absent. There is tenderness. There is no rebound and no guarding.   Musculoskeletal: Normal range of motion. She exhibits no edema or tenderness.   Lymphadenopathy:     She has no cervical adenopathy.   Neurological: She is alert and oriented to person, place, and time.   Skin: Skin is warm and dry. No rash noted. No erythema. No pallor.   Psychiatric: Her behavior is normal.   Nursing note and vitals reviewed.      ED Course        Procedures                   Results for orders placed or performed during the hospital encounter of 05/04/19 (from the past 24 hour(s))   CBC with platelets differential   Result Value Ref Range    WBC 12.0 (H) 4.0 - 11.0 10e9/L    RBC Count 5.23 (H) 3.8 - 5.2 10e12/L    Hemoglobin 14.9 11.7 - 15.7 g/dL    Hematocrit 43.7 35.0 - 47.0 %    MCV 84 78 - 100 fl    MCH 28.5 26.5 - 33.0 pg    MCHC 34.1 31.5 - 36.5 g/dL    RDW 13.1 10.0 - 15.0 %    Platelet Count 138 (L) 150 - 450 10e9/L    Diff Method Automated Method     % Neutrophils 69.4 %    % Lymphocytes 22.7 %    % Monocytes 6.2 %    % Eosinophils 1.1 %    % Basophils 0.3 %    % Immature Granulocytes 0.3 %    Nucleated RBCs 0 0 /100    Absolute Neutrophil 8.3 1.6 - 8.3 10e9/L    Absolute Lymphocytes 2.7 0.8 - 5.3 10e9/L    Absolute Monocytes 0.7 0.0 - 1.3 10e9/L    Absolute Eosinophils 0.1 0.0 - 0.7 10e9/L     Absolute Basophils 0.0 0.0 - 0.2 10e9/L    Abs Immature Granulocytes 0.0 0 - 0.4 10e9/L    Absolute Nucleated RBC 0.0    Comprehensive metabolic panel   Result Value Ref Range    Sodium 137 133 - 144 mmol/L    Potassium 3.7 3.4 - 5.3 mmol/L    Chloride 102 94 - 109 mmol/L    Carbon Dioxide 28 20 - 32 mmol/L    Anion Gap 7 3 - 14 mmol/L    Glucose 133 (H) 70 - 99 mg/dL    Urea Nitrogen 24 7 - 30 mg/dL    Creatinine 1.15 (H) 0.52 - 1.04 mg/dL    GFR Estimate 47 (L) >60 mL/min/[1.73_m2]    GFR Estimate If Black 55 (L) >60 mL/min/[1.73_m2]    Calcium 9.5 8.5 - 10.1 mg/dL    Bilirubin Total 0.4 0.2 - 1.3 mg/dL    Albumin 3.9 3.4 - 5.0 g/dL    Protein Total 8.2 6.8 - 8.8 g/dL    Alkaline Phosphatase 72 40 - 150 U/L    ALT 32 0 - 50 U/L    AST 20 0 - 45 U/L   Lactic acid whole blood   Result Value Ref Range    Lactic Acid 1.3 0.7 - 2.0 mmol/L   UA with Microscopic reflex to Culture   Result Value Ref Range    Color Urine Yellow     Appearance Urine Clear     Glucose Urine Negative NEG^Negative mg/dL    Bilirubin Urine Negative NEG^Negative    Ketones Urine Negative NEG^Negative mg/dL    Specific Gravity Urine 1.026 1.003 - 1.035    Blood Urine Negative NEG^Negative    pH Urine 5.5 4.7 - 8.0 pH    Protein Albumin Urine 10 (A) NEG^Negative mg/dL    Urobilinogen mg/dL Normal 0.0 - 2.0 mg/dL    Nitrite Urine Negative NEG^Negative    Leukocyte Esterase Urine Large (A) NEG^Negative    Source Midstream Urine     WBC Urine 52 (H) 0 - 5 /HPF    RBC Urine 5 (H) 0 - 2 /HPF    Bacteria Urine None (A) NEG^Negative /HPF    Squamous Epithelial /HPF Urine 5 (H) 0 - 1 /HPF    Mucous Urine Present (A) NEG^Negative /LPF    Hyaline Casts 1 (A) OTO2^O - 2 /LPF       Medications   cefTRIAXone in d5w (ROCEPHIN) intermittent infusion 1 g (1 g Intravenous New Bag 5/4/19 7615)   0.9% sodium chloride BOLUS (0 mLs Intravenous Stopped 5/4/19 0154)   morphine (PF) injection 4 mg (4 mg Intravenous Given 5/4/19 0046)   ondansetron (ZOFRAN) injection 4 mg  (4 mg Intravenous Given 5/4/19 0046)   morphine (PF) injection 4 mg (4 mg Intravenous Given 5/4/19 0248)   ondansetron (ZOFRAN) injection 4 mg (4 mg Intravenous Given 5/4/19 0247)       Assessments & Plan (with Medical Decision Making)   Generalized abd pain, nausea, vomiting started 7 pm  Hx of extensive abdominal surgery and multiple bowel obstruction in the past.  Symptoms improved after receiving morphine + zofran  No episode of vomiting in ER  Labs reviewed: elevated WBC  UA positive, 1 dose of iV ceftriaxone given  CT abd; Small bowel obstruction  I Spoke to Dr Mancuso, accepted for admssion  I have reviewed the nursing notes.    I have reviewed the findings, diagnosis, plan and need for follow up with the patient.         Medication List      There are no discharge medications for this visit.         Final diagnoses:   Small bowel obstruction (H)       5/3/2019   [facility]     Denzel Hernandez MD  05/04/19 5745

## 2019-05-04 NOTE — PLAN OF CARE
[facility] Inpatient Admission Note:    Patient admitted to 3116 /3116-1 at approximately 0330 via cart accompanied by transport tech from emergency room . Report received from Satish in SBAR format at 315 via telephone. Patient ambulated to bed via self.. Patient is alert and oriented X 3, reports pain; rates at 3 on 0-10 scale.  Patient oriented to room, unit, hourly rounding, and plan of care. Explained admission packet and patient handbook with patient bill of rights brochure. Will continue to monitor and document as needed.     Inpatient Nursing criteria listed below was met:    Health care directives status obtained and documented: Yes    MRSA swab completed for patient 65 years and older: Yes    Patient identifies a surrogate decision maker: Yes If yes, who:Rene mead Contact Information:Info on white board and in facesheet    Core Measure diagnosis present:No     If initial lactic acid >2.0, repeat lactic acid drawn within one hour of arrival to unit: NA.    Vaccination assessment and education completed: Yes   Vaccinations received prior to admission: Pneumovax yes  Influenza(seasonal)  YES    Clergy visit ordered if patient requests: N/A    Skin issues/needs documented: N/A    Isolation Patient: no    Fall Prevention no    Care Plan initiated: Yes    Education Documented (including assessment): Yes    Patient has discharge needs : Not at this time.

## 2019-05-04 NOTE — PROGRESS NOTES
"Assessment completed see flowsheet.    LOC: alert and oriented, very pleasant and willing to converse despite her annoyance with her NG and bleeding nose  Others present: Patient alone    Dx: SBO and UTI  Chronic Disease Management: HTN, Diabetes    Lives with: her  Greg  Living at:  Home in [place]  Transportation: YES She and Greg both drive    Primary PCP: Matthew Villela  Insurance:  Medicare and [employer] [facility]  Medicare IM letter reviewed with her this afternoon.    Support System:  Family and friends  Homecare/PCA: none  /[facility] Services:   none  : NO Her  is     [facility] Referral line called: na    Health Care Directive: YES and lists Greg and her sons Kiek and Ming as her health care agents  Guardian: not asked  POA:   Not asked    Pharmacy: [facility]  Meds management: YES manages meds independently without difficulty    Adequate Resources for needs (housing, utilities, food/med): YES  Household chores: independent normally  Work/community/social activity: YES as desired[place]    ADLs: independent  Ambulation:independent, uses a walker or canes  Falls: none  Nutrition: complains that her appetite is \"too good\", and says she should follow a diet for HTN and Diabetes but doesn't, independent with shopping and meal prep  Sleep: normally sleeps in a bed at night    Equipment used: none      Oxygen supplier: na      Does the supplier have valid oxygen orders: na    Mental health: no concerns  Substance abuse: no substance use  Exposure to violence/abuse: denies  Stressors: none voiced    Able to Return to Prior Living Arrangements: YES    Choice of Vendor: na    Barriers: none known    XIMENA: none    Plan: she plans to return home via ; any additional needs to be determined closer to discharge.       "

## 2019-05-04 NOTE — PLAN OF CARE
Face to face report given with opportunity to observe patient.    Report given to AYAKA Gonzales   5/4/2019  3:20 PM

## 2019-05-04 NOTE — PLAN OF CARE
Vitals: VSS. BP trending higher, 156/72. MD aware.     Pain: Pain rated 3/10 to ABD. Treating with morphine.     Orientation: A&O.    Cardiac: Reg    Lungs: Clear    ABD: Bowels Active. ABD pain. No stools this shift. No c/o N/V. NPO with ice.     Urinating: WNL    Skin: Pink/red area to keyshawn folds.     IV fluids: NS at 125.     Antibiotics: IV Rocephin.     Mobility: Stand by assist to help with pole. Steady on feet.       Safety: Bed alarm on. Call light in reach. Rounding done.    Comment: Slept well throughout night after admission was completed.      Face to face report given with opportunity to observe patient.    Report given to Raina Castro   5/4/2019  7:49 AM

## 2019-05-04 NOTE — PROVIDER NOTIFICATION
Dr. Batista notified of pt , and pt slightly bloody nose from NG placement. No new orders at this time.

## 2019-05-05 ENCOUNTER — APPOINTMENT (OUTPATIENT)
Dept: GENERAL RADIOLOGY | Facility: HOSPITAL | Age: 74
DRG: 390 | End: 2019-05-05
Attending: INTERNAL MEDICINE
Payer: MEDICARE

## 2019-05-05 LAB
ANION GAP SERPL CALCULATED.3IONS-SCNC: 6 MMOL/L (ref 3–14)
BACTERIA SPEC CULT: ABNORMAL
BACTERIA SPEC CULT: NORMAL
BASOPHILS # BLD AUTO: 0 10E9/L (ref 0–0.2)
BASOPHILS NFR BLD AUTO: 0.2 %
BUN SERPL-MCNC: 13 MG/DL (ref 7–30)
CALCIUM SERPL-MCNC: 8.3 MG/DL (ref 8.5–10.1)
CHLORIDE SERPL-SCNC: 106 MMOL/L (ref 94–109)
CO2 SERPL-SCNC: 26 MMOL/L (ref 20–32)
CREAT SERPL-MCNC: 0.96 MG/DL (ref 0.52–1.04)
DIFFERENTIAL METHOD BLD: ABNORMAL
EOSINOPHIL # BLD AUTO: 0.1 10E9/L (ref 0–0.7)
EOSINOPHIL NFR BLD AUTO: 1 %
ERYTHROCYTE [DISTWIDTH] IN BLOOD BY AUTOMATED COUNT: 13.2 % (ref 10–15)
GFR SERPL CREATININE-BSD FRML MDRD: 59 ML/MIN/{1.73_M2}
GLUCOSE BLDC GLUCOMTR-MCNC: 105 MG/DL (ref 70–99)
GLUCOSE BLDC GLUCOMTR-MCNC: 105 MG/DL (ref 70–99)
GLUCOSE BLDC GLUCOMTR-MCNC: 109 MG/DL (ref 70–99)
GLUCOSE SERPL-MCNC: 113 MG/DL (ref 70–99)
HCT VFR BLD AUTO: 37.1 % (ref 35–47)
HGB BLD-MCNC: 12.6 G/DL (ref 11.7–15.7)
IMM GRANULOCYTES # BLD: 0 10E9/L (ref 0–0.4)
IMM GRANULOCYTES NFR BLD: 0.2 %
LYMPHOCYTES # BLD AUTO: 2.3 10E9/L (ref 0.8–5.3)
LYMPHOCYTES NFR BLD AUTO: 21.3 %
MCH RBC QN AUTO: 28.6 PG (ref 26.5–33)
MCHC RBC AUTO-ENTMCNC: 34 G/DL (ref 31.5–36.5)
MCV RBC AUTO: 84 FL (ref 78–100)
MONOCYTES # BLD AUTO: 1 10E9/L (ref 0–1.3)
MONOCYTES NFR BLD AUTO: 9.3 %
NEUTROPHILS # BLD AUTO: 7.2 10E9/L (ref 1.6–8.3)
NEUTROPHILS NFR BLD AUTO: 68 %
NRBC # BLD AUTO: 0 10*3/UL
NRBC BLD AUTO-RTO: 0 /100
PLATELET # BLD AUTO: 111 10E9/L (ref 150–450)
POTASSIUM SERPL-SCNC: 3.7 MMOL/L (ref 3.4–5.3)
RBC # BLD AUTO: 4.4 10E12/L (ref 3.8–5.2)
SODIUM SERPL-SCNC: 138 MMOL/L (ref 133–144)
SPECIMEN SOURCE: ABNORMAL
SPECIMEN SOURCE: NORMAL
WBC # BLD AUTO: 10.6 10E9/L (ref 4–11)

## 2019-05-05 PROCEDURE — 25000128 H RX IP 250 OP 636: Performed by: INTERNAL MEDICINE

## 2019-05-05 PROCEDURE — 25800030 ZZH RX IP 258 OP 636: Performed by: INTERNAL MEDICINE

## 2019-05-05 PROCEDURE — 25800030 ZZH RX IP 258 OP 636

## 2019-05-05 PROCEDURE — 74019 RADEX ABDOMEN 2 VIEWS: CPT | Mod: TC

## 2019-05-05 PROCEDURE — A9270 NON-COVERED ITEM OR SERVICE: HCPCS | Performed by: INTERNAL MEDICINE

## 2019-05-05 PROCEDURE — 80048 BASIC METABOLIC PNL TOTAL CA: CPT | Performed by: INTERNAL MEDICINE

## 2019-05-05 PROCEDURE — 25000132 ZZH RX MED GY IP 250 OP 250 PS 637: Performed by: INTERNAL MEDICINE

## 2019-05-05 PROCEDURE — 99232 SBSQ HOSP IP/OBS MODERATE 35: CPT | Performed by: INTERNAL MEDICINE

## 2019-05-05 PROCEDURE — 00000146 ZZHCL STATISTIC GLUCOSE BY METER IP

## 2019-05-05 PROCEDURE — 36415 COLL VENOUS BLD VENIPUNCTURE: CPT | Performed by: INTERNAL MEDICINE

## 2019-05-05 PROCEDURE — 25000125 ZZHC RX 250: Performed by: INTERNAL MEDICINE

## 2019-05-05 PROCEDURE — C9113 INJ PANTOPRAZOLE SODIUM, VIA: HCPCS | Performed by: INTERNAL MEDICINE

## 2019-05-05 PROCEDURE — 85025 COMPLETE CBC W/AUTO DIFF WBC: CPT | Performed by: INTERNAL MEDICINE

## 2019-05-05 PROCEDURE — 12000000 ZZH R&B MED SURG/OB

## 2019-05-05 RX ORDER — ENALAPRILAT 1.25 MG/ML
1.25 INJECTION INTRAVENOUS EVERY 6 HOURS
Status: DISCONTINUED | OUTPATIENT
Start: 2019-05-05 | End: 2019-05-07

## 2019-05-05 RX ORDER — DEXTROSE MONOHYDRATE, SODIUM CHLORIDE, AND POTASSIUM CHLORIDE 50; .745; 4.5 G/1000ML; G/1000ML; G/1000ML
INJECTION, SOLUTION INTRAVENOUS CONTINUOUS
Status: DISPENSED | OUTPATIENT
Start: 2019-05-05 | End: 2019-05-06

## 2019-05-05 RX ORDER — DEXTROSE MONOHYDRATE, SODIUM CHLORIDE, AND POTASSIUM CHLORIDE 50; .745; 4.5 G/1000ML; G/1000ML; G/1000ML
INJECTION, SOLUTION INTRAVENOUS
Status: COMPLETED
Start: 2019-05-05 | End: 2019-05-05

## 2019-05-05 RX ADMIN — ENALAPRILAT 1.25 MG: 1.25 INJECTION INTRAVENOUS at 20:34

## 2019-05-05 RX ADMIN — MORPHINE SULFATE 4 MG: 4 INJECTION INTRAVENOUS at 06:35

## 2019-05-05 RX ADMIN — PANTOPRAZOLE SODIUM 40 MG: 40 INJECTION, POWDER, FOR SOLUTION INTRAVENOUS at 10:03

## 2019-05-05 RX ADMIN — METOPROLOL TARTRATE 25 MG: 25 TABLET, FILM COATED ORAL at 08:18

## 2019-05-05 RX ADMIN — KETOROLAC TROMETHAMINE 15 MG: 15 INJECTION, SOLUTION INTRAMUSCULAR; INTRAVENOUS at 08:18

## 2019-05-05 RX ADMIN — ENALAPRILAT 1.25 MG: 1.25 INJECTION INTRAVENOUS at 13:47

## 2019-05-05 RX ADMIN — LIDOCAINE HYDROCHLORIDE 5 ML: 20 SOLUTION ORAL; TOPICAL at 13:44

## 2019-05-05 RX ADMIN — KETOROLAC TROMETHAMINE 15 MG: 15 INJECTION, SOLUTION INTRAMUSCULAR; INTRAVENOUS at 02:48

## 2019-05-05 RX ADMIN — MORPHINE SULFATE 4 MG: 4 INJECTION INTRAVENOUS at 19:06

## 2019-05-05 RX ADMIN — KETOROLAC TROMETHAMINE 15 MG: 15 INJECTION, SOLUTION INTRAMUSCULAR; INTRAVENOUS at 14:00

## 2019-05-05 RX ADMIN — DEXTROSE MONOHYDRATE, SODIUM CHLORIDE, AND POTASSIUM CHLORIDE: 50; .745; 4.5 INJECTION, SOLUTION INTRAVENOUS at 00:20

## 2019-05-05 RX ADMIN — ENOXAPARIN SODIUM 40 MG: 40 INJECTION SUBCUTANEOUS at 06:35

## 2019-05-05 RX ADMIN — POTASSIUM CHLORIDE, DEXTROSE MONOHYDRATE AND SODIUM CHLORIDE: 75; 5; 450 INJECTION, SOLUTION INTRAVENOUS at 14:41

## 2019-05-05 RX ADMIN — POTASSIUM CHLORIDE, DEXTROSE MONOHYDRATE AND SODIUM CHLORIDE: 75; 5; 450 INJECTION, SOLUTION INTRAVENOUS at 14:05

## 2019-05-05 RX ADMIN — KETOROLAC TROMETHAMINE 15 MG: 15 INJECTION, SOLUTION INTRAMUSCULAR; INTRAVENOUS at 20:34

## 2019-05-05 RX ADMIN — POTASSIUM CHLORIDE, DEXTROSE MONOHYDRATE AND SODIUM CHLORIDE: 75; 5; 450 INJECTION, SOLUTION INTRAVENOUS at 00:20

## 2019-05-05 RX ADMIN — MORPHINE SULFATE 4 MG: 4 INJECTION INTRAVENOUS at 00:05

## 2019-05-05 RX ADMIN — CEFTRIAXONE SODIUM 1 G: 1 INJECTION, SOLUTION INTRAVENOUS at 02:48

## 2019-05-05 ASSESSMENT — ACTIVITIES OF DAILY LIVING (ADL)
ADLS_ACUITY_SCORE: 12

## 2019-05-05 NOTE — PLAN OF CARE
VSS, afebrile, BP elevated, IV Vasotec started, pt continues to have headaches, IV Morphine works well, pt taking scheduled Toradol also. HRR,  lungs clear, bowels active. NG clamped. Pt was able to pass gas late this afternoon. Pt did walk two laps around the floor mid afternoon. Pt up independently with walker or IV pole, makes needs known, alert and oriented. Pt has been NPO, except a few ice chips. Denies pain and nausea.     Face to face report given with opportunity to observe patient.    Report given to AYAKA Alaniz   5/5/2019  3:34 PM

## 2019-05-05 NOTE — PLAN OF CARE
A&O. /67  Temp 100.3 RA 92%. Denies abdominal pain/nausea but does c/o headache. NG to gravity drainage. Metoprolol given having been previously held on day shift. Nosebleed left nares from NG placement continues to dribble until ceasing about mid shift. BS active. Passing flatus but no BM. Re check temp t/o shift increases to 110.6 then declines to 99.7 last check. Morphine 2 mg initially given for HA relief to no effect. Orders  received for toradol 15 mg PRN q 6hrs. Given x 1 with decrease of headache pain, eventually sleeps. BG q 6 hours trending down with last BG 92; Dr Hdez notified, orders received. Up ind in room. Voiding. Remains NPO x ice/meds.  Face to face report given with opportunity to observe patient.    Report given to Christian Trujillo   5/4/2019  11:35 PM

## 2019-05-05 NOTE — PROVIDER NOTIFICATION
Dr. Batista updated on pt headache, pt fluids being discontinued and BP remaining slightly elevated. Awaiting new orders.

## 2019-05-05 NOTE — PLAN OF CARE
Resting in bed. No pain or nausea.  H/a comes and goes.  noticed on kleenex spects of bld from NG at nose site.

## 2019-05-05 NOTE — PROVIDER NOTIFICATION
Dr Hdez contacted to clarify NG order; also informed of blood glucose levels trending down with last BG 92. Will keep NG to gravity drainage, he will write orders for IVFs.

## 2019-05-05 NOTE — PLAN OF CARE
VSS, afebrile, BP elevated, 170's / 70's throughout the night, pt complaints of headache 5/10, given 4mg Morphine with relief. Pt denies nausea and abdominal pain. Lungs clear, bowels active. NG to gravity 75 mL of brown thin liquid. Pt is alert and oriented x 4, makes her needs known, up stand by assist to bathroom.

## 2019-05-05 NOTE — PROGRESS NOTES
[facility]  Hospitalist Progress Note          Assessment and Plan:   Cee Pereira is a 73 year old female who presents with abdominal pain since 6 pm night prior to admission, diagnosed with SBO and will be admitted for bowel rest, pain control.   Also has UTI and will be treated with Rocephin     Active Problems:    Small bowel obstruction (H)    Assessment: This is ongoing problem. Pt has h/o multiple surgeries and about once per year she has Bowel obstruction.  Usually lasts 1-2 days. Xray today is not terribly impressive as no clear air fluid levels. Urine culture is mixed ed so will stop antibiotics.      Plan:  Continue NG for now.  Likely will wait and reassess tomorrow before we try to remove NG or advance to clears.              NPO                ml/hr              Morphine for pain.               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 Admission glycemia 133.     Plan: will give only fraction of her Tresiba and will use only sliding scale for now.        GERD (gastroesophageal reflux disease)    Assessment: present on admission. Treated with PPI.     Plan: IV PPI       HTN (hypertension)    Assessment: has h/o essential HTN. Only beta blocker (25 mg po daily of metoprolol) on he home med list.     Plan: Here will use vasotec IV q 6 hr. May need addition to her BB when she goes home.          DVT Prophylaxis: Enoxaparin (Lovenox) SQ   Code Status: Full Code     Disposition: Expected discharge in 1-2 days once SBO resolve.            Interval History:   NG remains.  She still reports some pain but improved.  No flatus and no stooling reported by patient.                Medications:       cefTRIAXone  1 g Intravenous Q24H     enoxaparin  40 mg Subcutaneous Q24H     ketorolac  15 mg  Intravenous Q6H     metoprolol tartrate  25 mg Oral Daily                  Physical Exam:     Vitals:    19 2339 19 0005 19 0245 19 0622   BP: 179/78 171/78 174/75 178/75   Pulse:       Resp: 20  20 16   Temp: 99.2  F (37.3  C)  99.7  F (37.6  C) 98.9  F (37.2  C)   TempSrc: Tympanic  Tympanic Tympanic   SpO2: 94%  93% 93%   Weight:       Height:             Vital Sign Ranges  Temperature Temp  Av.6  F (37.6  C)  Min: 98.9  F (37.2  C)  Max: 100.6  F (38.1  C)   Blood pressure Systolic (24hrs), Av , Min:151 , Max:179        Diastolic (24hrs), Av, Min:67, Max:78      Pulse No data recorded   Respirations Resp  Av.6  Min: 16  Max: 20   Pulse oximetry SpO2  Av %  Min: 92 %  Max: 94 %         Intake/Output Summary (Last 24 hours) at 2019 0857  Last data filed at 2019 0820  Gross per 24 hour   Intake 2768 ml   Output 2185 ml   Net 583 ml     GENERAL: Patient is awake, alert, oriented x 3  CARDIAC: RRR, S1,S2 present, no S3. No murmurs/gallops or rubs.   PULMONARY: not labored breathing, no accessory muscle use. Clear breath sounds bilaterally.   GI: distended, diffusely tender. Multiple scars. BS are present  MS:  no synovitis, no sarcopenia  NEUROLOGICAL: not done, not indicated.   PSYCHIATRIC: normal affect, normal mood.  INTEGUMENT: intact, no rash, no ulcerations.        Peripheral IV 19 Left Upper forearm (Active)   Site Assessment WDL 2019  6:41 AM   Line Status Infusing 2019  6:41 AM   Phlebitis Scale 0-->no symptoms 2019  6:41 AM   Infiltration Scale 0 2019  6:41 AM   Number of days: 1       NG/OG Tube Nasogastric Left nostril (Active)   Site Description WDL except 2019  6:42 AM   Status Open to gravity drainage 2019  6:42 AM   Drainage Appearance Brown 2019  6:42 AM   Placement Check Aspiration of gastric content 2019 11:39 PM   [place] (cm marking) at nare/mouth 63 cm 2019  9:00 AM   Output (ml) 75 ml 2019   6:42 AM   Number of days: 1     NG tube             Data:     Lab Results   Component Value Date    WBC 10.6 05/05/2019    HGB 12.6 05/05/2019    HCT 37.1 05/05/2019     (L) 05/05/2019     05/05/2019    POTASSIUM 3.7 05/05/2019    CHLORIDE 106 05/05/2019    CO2 26 05/05/2019    BUN 13 05/05/2019    CR 0.96 05/05/2019     (H) 05/05/2019    SED 11 12/29/2016    DIMER 204 04/22/2018    NTBNPI 56 04/22/2018    TROPI <0.015 09/18/2018    AST 20 05/04/2019    ALT 32 05/04/2019    ALKPHOS 72 05/04/2019    BILITOTAL 0.4 05/04/2019    INR 1.12 08/17/2016     Lactic Acid   Date Value Ref Range Status   05/04/2019 1.3 0.7 - 2.0 mmol/L Final   09/19/2018 0.7 0.4 - 2.0 mmol/L Final   09/18/2018 1.2 0.4 - 2.0 mmol/L Final       Ramón Batista, DO

## 2019-05-06 ENCOUNTER — APPOINTMENT (OUTPATIENT)
Dept: GENERAL RADIOLOGY | Facility: HOSPITAL | Age: 74
DRG: 390 | End: 2019-05-06
Attending: INTERNAL MEDICINE
Payer: MEDICARE

## 2019-05-06 LAB
ANION GAP SERPL CALCULATED.3IONS-SCNC: 7 MMOL/L (ref 3–14)
BASOPHILS # BLD AUTO: 0 10E9/L (ref 0–0.2)
BASOPHILS NFR BLD AUTO: 0.3 %
BUN SERPL-MCNC: 11 MG/DL (ref 7–30)
CALCIUM SERPL-MCNC: 8.6 MG/DL (ref 8.5–10.1)
CHLORIDE SERPL-SCNC: 105 MMOL/L (ref 94–109)
CO2 SERPL-SCNC: 28 MMOL/L (ref 20–32)
CREAT SERPL-MCNC: 1.09 MG/DL (ref 0.52–1.04)
DIFFERENTIAL METHOD BLD: ABNORMAL
EOSINOPHIL # BLD AUTO: 0.1 10E9/L (ref 0–0.7)
EOSINOPHIL NFR BLD AUTO: 1 %
ERYTHROCYTE [DISTWIDTH] IN BLOOD BY AUTOMATED COUNT: 13 % (ref 10–15)
GFR SERPL CREATININE-BSD FRML MDRD: 51 ML/MIN/{1.73_M2}
GLUCOSE BLDC GLUCOMTR-MCNC: 103 MG/DL (ref 70–99)
GLUCOSE BLDC GLUCOMTR-MCNC: 105 MG/DL (ref 70–99)
GLUCOSE BLDC GLUCOMTR-MCNC: 108 MG/DL (ref 70–99)
GLUCOSE BLDC GLUCOMTR-MCNC: 129 MG/DL (ref 70–99)
GLUCOSE SERPL-MCNC: 147 MG/DL (ref 70–99)
HCT VFR BLD AUTO: 37.8 % (ref 35–47)
HGB BLD-MCNC: 12.8 G/DL (ref 11.7–15.7)
IMM GRANULOCYTES # BLD: 0 10E9/L (ref 0–0.4)
IMM GRANULOCYTES NFR BLD: 0.2 %
LYMPHOCYTES # BLD AUTO: 1.9 10E9/L (ref 0.8–5.3)
LYMPHOCYTES NFR BLD AUTO: 20.7 %
MCH RBC QN AUTO: 28.4 PG (ref 26.5–33)
MCHC RBC AUTO-ENTMCNC: 33.9 G/DL (ref 31.5–36.5)
MCV RBC AUTO: 84 FL (ref 78–100)
MONOCYTES # BLD AUTO: 0.9 10E9/L (ref 0–1.3)
MONOCYTES NFR BLD AUTO: 9.1 %
NEUTROPHILS # BLD AUTO: 6.4 10E9/L (ref 1.6–8.3)
NEUTROPHILS NFR BLD AUTO: 68.7 %
NRBC # BLD AUTO: 0 10*3/UL
NRBC BLD AUTO-RTO: 0 /100
PLATELET # BLD AUTO: 110 10E9/L (ref 150–450)
POTASSIUM SERPL-SCNC: 3.8 MMOL/L (ref 3.4–5.3)
RBC # BLD AUTO: 4.5 10E12/L (ref 3.8–5.2)
SODIUM SERPL-SCNC: 140 MMOL/L (ref 133–144)
WBC # BLD AUTO: 9.4 10E9/L (ref 4–11)

## 2019-05-06 PROCEDURE — 25000132 ZZH RX MED GY IP 250 OP 250 PS 637: Performed by: INTERNAL MEDICINE

## 2019-05-06 PROCEDURE — A9270 NON-COVERED ITEM OR SERVICE: HCPCS | Performed by: INTERNAL MEDICINE

## 2019-05-06 PROCEDURE — 25000125 ZZHC RX 250: Performed by: INTERNAL MEDICINE

## 2019-05-06 PROCEDURE — 00000146 ZZHCL STATISTIC GLUCOSE BY METER IP

## 2019-05-06 PROCEDURE — 85025 COMPLETE CBC W/AUTO DIFF WBC: CPT | Performed by: INTERNAL MEDICINE

## 2019-05-06 PROCEDURE — 12000000 ZZH R&B MED SURG/OB

## 2019-05-06 PROCEDURE — 99232 SBSQ HOSP IP/OBS MODERATE 35: CPT | Performed by: INTERNAL MEDICINE

## 2019-05-06 PROCEDURE — 25000128 H RX IP 250 OP 636: Performed by: INTERNAL MEDICINE

## 2019-05-06 PROCEDURE — C9113 INJ PANTOPRAZOLE SODIUM, VIA: HCPCS | Performed by: INTERNAL MEDICINE

## 2019-05-06 PROCEDURE — 25800030 ZZH RX IP 258 OP 636: Performed by: INTERNAL MEDICINE

## 2019-05-06 PROCEDURE — 80048 BASIC METABOLIC PNL TOTAL CA: CPT | Performed by: INTERNAL MEDICINE

## 2019-05-06 PROCEDURE — 36415 COLL VENOUS BLD VENIPUNCTURE: CPT | Performed by: INTERNAL MEDICINE

## 2019-05-06 PROCEDURE — 74019 RADEX ABDOMEN 2 VIEWS: CPT | Mod: TC

## 2019-05-06 RX ORDER — BENZONATATE 100 MG/1
100 CAPSULE ORAL 3 TIMES DAILY PRN
Status: DISCONTINUED | OUTPATIENT
Start: 2019-05-06 | End: 2019-05-07 | Stop reason: HOSPADM

## 2019-05-06 RX ADMIN — KETOROLAC TROMETHAMINE 15 MG: 15 INJECTION, SOLUTION INTRAMUSCULAR; INTRAVENOUS at 14:22

## 2019-05-06 RX ADMIN — BENZONATATE 100 MG: 100 CAPSULE ORAL at 19:42

## 2019-05-06 RX ADMIN — ENALAPRILAT 1.25 MG: 1.25 INJECTION INTRAVENOUS at 06:15

## 2019-05-06 RX ADMIN — KETOROLAC TROMETHAMINE 15 MG: 15 INJECTION, SOLUTION INTRAMUSCULAR; INTRAVENOUS at 21:19

## 2019-05-06 RX ADMIN — METOPROLOL TARTRATE 25 MG: 25 TABLET, FILM COATED ORAL at 08:21

## 2019-05-06 RX ADMIN — ENALAPRILAT 1.25 MG: 1.25 INJECTION INTRAVENOUS at 00:56

## 2019-05-06 RX ADMIN — BENZONATATE 100 MG: 100 CAPSULE ORAL at 11:36

## 2019-05-06 RX ADMIN — ENALAPRILAT 1.25 MG: 1.25 INJECTION INTRAVENOUS at 19:42

## 2019-05-06 RX ADMIN — KETOROLAC TROMETHAMINE 15 MG: 15 INJECTION, SOLUTION INTRAMUSCULAR; INTRAVENOUS at 08:21

## 2019-05-06 RX ADMIN — ENOXAPARIN SODIUM 40 MG: 40 INJECTION SUBCUTANEOUS at 06:15

## 2019-05-06 RX ADMIN — POTASSIUM CHLORIDE, DEXTROSE MONOHYDRATE AND SODIUM CHLORIDE: 75; 5; 450 INJECTION, SOLUTION INTRAVENOUS at 00:45

## 2019-05-06 RX ADMIN — PANTOPRAZOLE SODIUM 40 MG: 40 INJECTION, POWDER, FOR SOLUTION INTRAVENOUS at 08:21

## 2019-05-06 RX ADMIN — ENALAPRILAT 1.25 MG: 1.25 INJECTION INTRAVENOUS at 14:21

## 2019-05-06 RX ADMIN — KETOROLAC TROMETHAMINE 15 MG: 15 INJECTION, SOLUTION INTRAMUSCULAR; INTRAVENOUS at 01:15

## 2019-05-06 RX ADMIN — MORPHINE SULFATE 4 MG: 4 INJECTION INTRAVENOUS at 19:42

## 2019-05-06 ASSESSMENT — ACTIVITIES OF DAILY LIVING (ADL)
ADLS_ACUITY_SCORE: 12

## 2019-05-06 NOTE — PLAN OF CARE
Pt alert and orientated.  Npo with ice chips.  Up to bathroom x4.  Voiding OK.  IV infusing.  NG clamped.  No nausea.    at 6pm. Scant bloody drainage around NG tube  in left nostril.   No edema lower extremities. Lungs clear. Bowel sounds +,  passing flatus.  Uses call light appropriately.

## 2019-05-06 NOTE — PLAN OF CARE
Face to face report given with opportunity to observe patient.    Report given to AYAKA Larkin   5/6/2019  3:22 PM

## 2019-05-06 NOTE — PROGRESS NOTES
BON FUENTES  Patient visit during  rounds.  Patient noted that she is not affiliated with a [employer] and did not want any contact made with a parish or . She was informed that a [facility] [place]  will be making rounds each morning. No other spiritual care requests.

## 2019-05-06 NOTE — PROGRESS NOTES
[facility]  Hospitalist Progress Note          Assessment and Plan:   Cee Pereira is a 73 year old female who presents with abdominal pain since 6 pm night prior to admission, diagnosed with SBO and will be admitted for bowel rest, pain control.   Also has UTI and will be treated with Rocephin     Active Problems:    Small bowel obstruction (H)    Assessment: Recurrent. Pt has h/o multiple surgeries and about once per year she has Bowel obstruction.  Usually lasts 1-2 days. Xray today is improved as are her symptoms.  She had a BM this morning. Urine culture is mixed ed so will stop antibiotics.      Plan:  D/c NG and start clears.  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 Admission glycemia 133.     Plan: Continue only fraction of her Tresiba and will use only sliding scale for now.        GERD (gastroesophageal reflux disease)    Assessment: present on admission. Treated with PPI.     Plan: IV PPI       HTN (hypertension)    Assessment: has h/o essential HTN. Only beta blocker (25 mg po daily of metoprolol) on he home med list.     Plan: Here will use vasotec IV q 6 hr. may need addition to her BB when she goes home.          DVT Prophylaxis: Enoxaparin (Lovenox) SQ   Code Status: Full Code     Disposition: Expected discharge tomorrow             Interval History:   Patient feeling better.  She had a BM this morning.  Pain in LLQ improved.  Vitals stable.                Medications:       enalaprilat  1.25 mg Intravenous Q6H     enoxaparin  40 mg Subcutaneous Q24H     ketorolac  15 mg Intravenous Q6H     metoprolol tartrate  25 mg Oral Daily     pantoprazole (PROTONIX) IV  40 mg Intravenous Daily with breakfast                  Physical Exam:      Vitals:    19 1906 19 0047 19 0612 19 0821   BP: 177/70 148/66 165/69 178/85   BP Location:    Left arm   Pulse:    88   Resp: 16 16 16 16   Temp: 100.3  F (37.9  C) 99.3  F (37.4  C) 99.1  F (37.3  C) 98.7  F (37.1  C)   TempSrc: Tympanic Tympanic Tympanic    SpO2: 95% 96% 94% 95%   Weight:       Height:             Vital Sign Ranges  Temperature Temp  Av.4  F (37.4  C)  Min: 98.7  F (37.1  C)  Max: 100.3  F (37.9  C)   Blood pressure Systolic (24hrs), Av , Min:148 , Max:178        Diastolic (24hrs), Av, Min:66, Max:85      Pulse Pulse  Av  Min: 88  Max: 88   Respirations Resp  Av  Min: 16  Max: 16   Pulse oximetry SpO2  Av.5 %  Min: 92 %  Max: 96 %         Intake/Output Summary (Last 24 hours) at 2019 1218  Last data filed at 2019 0800  Gross per 24 hour   Intake 603 ml   Output 1925 ml   Net -1322 ml     GENERAL: Patient is awake, alert, oriented x 3  CARDIAC: RRR, S1,S2 present, no S3. No murmurs/gallops or rubs.   PULMONARY: not labored breathing, no accessory muscle use. Clear breath sounds bilaterally.   GI: Minimal tenderness this morning.  Positive BS.    MS:  no synovitis, no sarcopenia  NEUROLOGICAL: not done, not indicated.   PSYCHIATRIC: normal affect, normal mood.  INTEGUMENT: intact, no rash, no ulcerations.        Peripheral IV 19 Left Upper forearm (Active)   Site Assessment [facility] 2019 12:48 AM   Line Status Infusing;Checked every 1-2 hour 2019 12:48 AM   Phlebitis Scale 0-->no symptoms 2019 12:48 AM   Infiltration Scale 0 2019 12:48 AM   Number of days: 2       NG/OG Tube Nasogastric Left nostril (Active)   Site Description WDL 2019 12:47 AM   Status Clamped 2019 12:47 AM   Drainage Appearance Brown 2019  6:42 AM   Placement Check Aspiration of gastric content 2019 11:39 PM   [place] (cm marking) at nare/mouth 63 cm 2019 12:47 AM   Output (ml) 75 ml 2019  6:42 AM   Number of days: 2     No  line/device             Data:     Lab Results   Component Value Date    WBC 9.4 05/06/2019    HGB 12.8 05/06/2019    HCT 37.8 05/06/2019     (L) 05/06/2019     05/06/2019    POTASSIUM 3.8 05/06/2019    CHLORIDE 105 05/06/2019    CO2 28 05/06/2019    BUN 11 05/06/2019    CR 1.09 (H) 05/06/2019     (H) 05/06/2019    SED 11 12/29/2016    DIMER 204 04/22/2018    NTBNPI 56 04/22/2018    TROPI <0.015 09/18/2018    AST 20 05/04/2019    ALT 32 05/04/2019    ALKPHOS 72 05/04/2019    BILITOTAL 0.4 05/04/2019    INR 1.12 08/17/2016     Lactic Acid   Date Value Ref Range Status   05/04/2019 1.3 0.7 - 2.0 mmol/L Final   09/19/2018 0.7 0.4 - 2.0 mmol/L Final   09/18/2018 1.2 0.4 - 2.0 mmol/L Final       Ramón Batista, DO

## 2019-05-06 NOTE — PLAN OF CARE
Pt. VSS throughout morning, NG tube Removed approx 0830 pt. Tolerated procedure well and stated she was very happy to have it out. Pt. Looking forward to advancing to clear liquid diet this afternoon as per Hospitalist recommendation. Pt. diet has been advanced to Full Liquid diet pt. Tolerated clears well, and is comfortable with scheduled medications for pain. Pt. States pain has been well controled throughout shift.

## 2019-05-06 NOTE — PLAN OF CARE
Vitals: VSS. Low grade fever of 99.3.      Pain: Denied. Treating with scheduled Toradol. Morphine available.       Orientation: A&O.     Cardiac: Reg     Lungs: Clear     ABD: Bowels hyperactive. No stools this shift. No c/o N/V. NPO with ice.      Urinating: WNL     Skin: WNL      IV fluids: D5/0.45NS/10K at 100..      Antibiotics: None     Mobility: Stand by assist to help with pole. Steady on feet.        Safety: Call light in reach. Rounding done.     Comment: Slept well throughout night.    Face to face report given with opportunity to observe patient.    Report given to Manny Castro   5/6/2019  7:12 AM

## 2019-05-06 NOTE — PLAN OF CARE
Face to face report given with opportunity to observe patient.    Report given to ayala Amanda   5/5/2019  11:20 PM

## 2019-05-07 VITALS
TEMPERATURE: 96.4 F | DIASTOLIC BLOOD PRESSURE: 76 MMHG | WEIGHT: 176.15 LBS | HEART RATE: 88 BPM | RESPIRATION RATE: 17 BRPM | HEIGHT: 66 IN | OXYGEN SATURATION: 95 % | BODY MASS INDEX: 28.31 KG/M2 | SYSTOLIC BLOOD PRESSURE: 161 MMHG

## 2019-05-07 LAB
GLUCOSE BLDC GLUCOMTR-MCNC: 102 MG/DL (ref 70–99)
GLUCOSE BLDC GLUCOMTR-MCNC: 98 MG/DL (ref 70–99)
GLUCOSE SERPL-MCNC: 102 MG/DL (ref 70–99)

## 2019-05-07 PROCEDURE — 25000125 ZZHC RX 250: Performed by: INTERNAL MEDICINE

## 2019-05-07 PROCEDURE — 82947 ASSAY GLUCOSE BLOOD QUANT: CPT | Performed by: INTERNAL MEDICINE

## 2019-05-07 PROCEDURE — 25000132 ZZH RX MED GY IP 250 OP 250 PS 637: Performed by: INTERNAL MEDICINE

## 2019-05-07 PROCEDURE — 36415 COLL VENOUS BLD VENIPUNCTURE: CPT | Performed by: INTERNAL MEDICINE

## 2019-05-07 PROCEDURE — 99238 HOSP IP/OBS DSCHRG MGMT 30/<: CPT | Performed by: INTERNAL MEDICINE

## 2019-05-07 PROCEDURE — 00000146 ZZHCL STATISTIC GLUCOSE BY METER IP

## 2019-05-07 PROCEDURE — A9270 NON-COVERED ITEM OR SERVICE: HCPCS | Performed by: INTERNAL MEDICINE

## 2019-05-07 PROCEDURE — 25000128 H RX IP 250 OP 636: Performed by: INTERNAL MEDICINE

## 2019-05-07 RX ORDER — PANTOPRAZOLE SODIUM 40 MG/1
40 TABLET, DELAYED RELEASE ORAL
Status: DISCONTINUED | OUTPATIENT
Start: 2019-05-07 | End: 2019-05-07 | Stop reason: HOSPADM

## 2019-05-07 RX ORDER — ACETAMINOPHEN 325 MG/1
650 TABLET ORAL EVERY 4 HOURS PRN
Status: DISCONTINUED | OUTPATIENT
Start: 2019-05-07 | End: 2019-05-07 | Stop reason: HOSPADM

## 2019-05-07 RX ADMIN — ENOXAPARIN SODIUM 40 MG: 40 INJECTION SUBCUTANEOUS at 06:09

## 2019-05-07 RX ADMIN — ENALAPRILAT 1.25 MG: 1.25 INJECTION INTRAVENOUS at 06:09

## 2019-05-07 RX ADMIN — PANTOPRAZOLE SODIUM 40 MG: 40 TABLET, DELAYED RELEASE ORAL at 08:13

## 2019-05-07 RX ADMIN — METOPROLOL TARTRATE 25 MG: 25 TABLET, FILM COATED ORAL at 08:12

## 2019-05-07 RX ADMIN — ENALAPRILAT 1.25 MG: 1.25 INJECTION INTRAVENOUS at 01:33

## 2019-05-07 RX ADMIN — KETOROLAC TROMETHAMINE 15 MG: 15 INJECTION, SOLUTION INTRAMUSCULAR; INTRAVENOUS at 01:33

## 2019-05-07 RX ADMIN — ACETAMINOPHEN 650 MG: 325 TABLET, FILM COATED ORAL at 07:26

## 2019-05-07 ASSESSMENT — ACTIVITIES OF DAILY LIVING (ADL)
ADLS_ACUITY_SCORE: 12

## 2019-05-07 NOTE — PLAN OF CARE
"Reason for hospital stay:  SBO  Most recent vitals: /71   Pulse 88   Temp 98.8  F (37.1  C) (Tympanic)   Resp 16   Ht 1.676 m (5' 6\")   Wt 79.9 kg (176 lb 2.4 oz)   SpO2 95%   BMI 28.43 kg/m    Pain Management:  Denies pain  Orientation:  A/O  Cardiac:  WDL  Respiratory:  Lung sounds clear bilat sats 95% on room air  GI:  Bowel sounds audible et hyperactive x4, passing flatus, no reports of BM this shift  :  WDL  Nutrition:  Full Liquid  Skin Issues:  Intact  IVF:  Saline locked  ABX:  n/a  Mobility:  Repositions in bed et ambulates to BR independently   Safety:  A/O steady on feet  Comments:      5/7/2019  4:51 AM  Michaelle Miguel  Face to face report given with opportunity to observe patient.    Report given to Mary Miguel   5/7/2019  6:56 AM      "

## 2019-05-07 NOTE — PLAN OF CARE
Face to face report given with opportunity to observe patient.    Report given to compa Amanda   5/6/2019  11:28 PM

## 2019-05-07 NOTE — DISCHARGE SUMMARY
Patient discharged to home.  Sister-in-law picked her up.  Discharge time 1100.    Ariela Goel, Student Nurse

## 2019-05-07 NOTE — PLAN OF CARE
Pt alert and orientated. Full liquid for supper.  Ate ice cream. Had left side crampimg. After eating.  Walked in hallway with assist.  Had loose BM x1 . Voided x1.  Medicated with Morphine for abd. Cramping.  Resting in bed.

## 2019-05-07 NOTE — PROGRESS NOTES
Name: Cee Pereira    MRN#: [id number]    Reason for Hospitalization: Small bowel obstruction (H) [K56.609]    XIMENA: low    Discharge Date: 5/7/2019    Medicare IM letter reviewed with her this morning    Patient / Family response to discharge plan: she understand and agrees, does not feel she will need any additional supports    Follow-Up Appt: No future appointments.    Other Providers (Care Coordinator, County Services, PCA services etc): No    Discharge Disposition: home via her sister in law        Blossom Matias RN

## 2019-05-07 NOTE — DISCHARGE INSTRUCTIONS
We have scheduled a follow-up appointment at the [facility] with your Primary Care Provider Dr. Villela on Wednesday May 15th at 1:00pm. If you have any questions regarding this appointment or need to reschedule, please call the [facility] at [phone].

## 2019-05-07 NOTE — PLAN OF CARE
Patient discharged at 10:25AM via wheel chair accompanied by sister and staff. Prescriptions - None ordered for discharge. All belongings sent with patient.     Discharge instructions reviewed with pt. Listed belongings gathered and returned to patient.     Patient discharged to home  Report called to n/a     Core Measures and Vaccines  Core Measures applicable during stay: No. If yes, state diagnosis:     Pneumonia and Influenza given prior to discharge, if indicated: N/A    Surgical Patient   Surgical Procedures during stay: n/a  Did patient receive discharge instruction on wound care and recognition of infection symptoms? N/A    MISC  Follow up appointment made:  Yes  Home and hospital aquired medications returned to patient: N/A  Patient reports pain was well managed at discharge: Yes

## 2019-05-08 NOTE — DISCHARGE SUMMARY
Admit Date:     05/04/2019   Discharge Date:     05/07/2019      DATE OF ADMISSION 05/04/2018      DATE OF DISCHARGE 5/7/2019        DISCHARGE DIAGNOSES:   1.  Small-bowel obstruction secondary to adhesions.   2.  Diabetes mellitus type 2, insulin requiring.   3.  Hypertension.      PROCEDURES:  None.      HOSPITAL COURSE:  Cee is a 73-year-old female who has had multiple previous bowel surgeries with resultant adhesions and recurrent small bowel obstructions.  Last one was about back in September.  She developed the evening before increasing abdominal distention and pain and nausea.  She came into our ER, was hemodynamically stable, no fevers.  Did have a CT scan which did show a collection of a small bowel in left lower quadrant consistent with a probable bowel obstruction.  She had pretty diffuse pain, 7/10.  She subsequently had an NG tube placed.  She was admitted with n.p.o. status and IV fluids.  Gradually, she did improve with conservative measures.  Her NG tube was subsequently removed.  She was having bowel movements.  She was able to tolerate a fairly solid diet.  Today, she was up ambulating, afebrile, blood pressure 160/70, temperature is 96, heart rate 80, sats are 95% on room air.  Lab work actually done yesterday showed BUN and creatinine were 11 and 1.09.  Sodium 140, potassium 3.8, chloride 105, CO2 was 28.  Her white count was 9000, hemoglobin is 12.8, platelet count 110,000.  She is ambulating and felt actually back to her baseline.  She has been through this multiple times and feels that she is back to her usual baseline.  Therefore, she will be discharged home today.  Her blood sugars were running a little bit on the normal side, I guess, around 100.  She usually on Tresiba.  She will continue to check her blood sugars, hold her Tresiba until her blood sugars start to come up higher, then she can be restarted as long as she is eating fairly good diet.  Otherwise, wait until she sees   Manohar in followup.      DISCHARGE MEDICATIONS:  Unchanged from before.    1.  Aggrenox 25/200 one capsule daily.     2.  CoQ10, 100 mg daily.   3.  Gabapentin 300 mg 2 times daily in a.m. and then at noon and then 600 mg at bedtime.   3.  Metoprolol tartrate 25 mg daily.   4.  Protonix 40 mg daily.   5.  Simvastatin 10 mg daily.   6.  Tresiba is usually 24 units subcutaneous at bedtime.      ACTIVITY:  As tolerated.      DIET:  As she tolerates.        FOLLOWUP:  Follow up with Dr. Villela on 05/15 at 1:00 p.m., sooner if she develops any worsening troubles or problems.         KAITLIN ROGEL MD             D: 2019   T: 2019   MT: BON      Name:     BON FUENTES   MRN:      [id number]        Account:        [id number]   :      1945           Admit Date:     2019                                  Discharge Date: 2019      Document: [id number]       cc: Matthew Villela DO

## 2019-05-21 ENCOUNTER — TELEPHONE (OUTPATIENT)
Dept: CASE MANAGEMENT | Facility: HOSPITAL | Age: 74
End: 2019-05-21

## 2019-05-25 ENCOUNTER — TELEPHONE (OUTPATIENT)
Dept: CASE MANAGEMENT | Facility: HOSPITAL | Age: 74
End: 2019-05-25

## 2019-05-25 NOTE — TELEPHONE ENCOUNTER
Cee Pereira, was discharged to home on 5/7/19  from [facility]. I spoke today with her regarding her  discharge.   She indicates she has receive(d) sufficient information upon discharge. Medications were reviewed in full on discharge, including:  medications to be continued from preadmission and any side effects. Prescriptions  - None received at discharge.  Medications are being taken as prescribed.   She indicates she already went to her follow up appointment with her PCP on May 15.  She did not have any questions regarding discharge instructions or condition.  Per their request, the following employee (s) can be recognized for their outstanding services delivered:  Care was great.  Suggestions to improve service: Nothing indicated.   She was informed she  may receive a survey in the mail from the hospital. Asked if she  would kindly complete the survey in order for [facility] to know if services fully met patient needs.

## 2019-09-04 ENCOUNTER — APPOINTMENT (OUTPATIENT)
Dept: CT IMAGING | Facility: HOSPITAL | Age: 74
DRG: 392 | End: 2019-09-04
Attending: PHYSICIAN ASSISTANT
Payer: MEDICARE

## 2019-09-04 ENCOUNTER — APPOINTMENT (OUTPATIENT)
Dept: GENERAL RADIOLOGY | Facility: HOSPITAL | Age: 74
DRG: 392 | End: 2019-09-04
Attending: PHYSICIAN ASSISTANT
Payer: MEDICARE

## 2019-09-04 ENCOUNTER — HOSPITAL ENCOUNTER (INPATIENT)
Facility: HOSPITAL | Age: 74
LOS: 1 days | Discharge: HOME | DRG: 392 | End: 2019-09-06
Attending: PHYSICIAN ASSISTANT | Admitting: INTERNAL MEDICINE
Payer: MEDICARE

## 2019-09-04 DIAGNOSIS — R10.12 LUQ ABDOMINAL PAIN: Primary | ICD-10-CM

## 2019-09-04 DIAGNOSIS — K65.1 INTRA-ABDOMINAL ABSCESS (H): ICD-10-CM

## 2019-09-04 LAB
ALBUMIN SERPL-MCNC: 4.2 G/DL (ref 3.4–5)
ALBUMIN UR-MCNC: NEGATIVE MG/DL
ALP SERPL-CCNC: 95 U/L (ref 40–150)
ALT SERPL W P-5'-P-CCNC: 32 U/L (ref 0–50)
ANION GAP SERPL CALCULATED.3IONS-SCNC: 6 MMOL/L (ref 3–14)
APPEARANCE UR: CLEAR
AST SERPL W P-5'-P-CCNC: 16 U/L (ref 0–45)
BASOPHILS # BLD AUTO: 0 10E9/L (ref 0–0.2)
BASOPHILS NFR BLD AUTO: 0.3 %
BILIRUB SERPL-MCNC: 0.3 MG/DL (ref 0.2–1.3)
BILIRUB UR QL STRIP: NEGATIVE
BUN SERPL-MCNC: 16 MG/DL (ref 7–30)
CALCIUM SERPL-MCNC: 9.6 MG/DL (ref 8.5–10.1)
CHLORIDE SERPL-SCNC: 103 MMOL/L (ref 94–109)
CO2 SERPL-SCNC: 29 MMOL/L (ref 20–32)
COLOR UR AUTO: NORMAL
CREAT SERPL-MCNC: 1.21 MG/DL (ref 0.52–1.04)
DIFFERENTIAL METHOD BLD: ABNORMAL
EOSINOPHIL # BLD AUTO: 0.4 10E9/L (ref 0–0.7)
EOSINOPHIL NFR BLD AUTO: 3.1 %
ERYTHROCYTE [DISTWIDTH] IN BLOOD BY AUTOMATED COUNT: 13.6 % (ref 10–15)
GFR SERPL CREATININE-BSD FRML MDRD: 44 ML/MIN/{1.73_M2}
GLUCOSE SERPL-MCNC: 112 MG/DL (ref 70–99)
GLUCOSE UR STRIP-MCNC: NEGATIVE MG/DL
HCT VFR BLD AUTO: 42.7 % (ref 35–47)
HGB BLD-MCNC: 14.1 G/DL (ref 11.7–15.7)
HGB UR QL STRIP: NEGATIVE
IMM GRANULOCYTES # BLD: 0 10E9/L (ref 0–0.4)
IMM GRANULOCYTES NFR BLD: 0.3 %
KETONES UR STRIP-MCNC: NEGATIVE MG/DL
LACTATE BLD-SCNC: 1 MMOL/L (ref 0.7–2)
LEUKOCYTE ESTERASE UR QL STRIP: NEGATIVE
LYMPHOCYTES # BLD AUTO: 4.1 10E9/L (ref 0.8–5.3)
LYMPHOCYTES NFR BLD AUTO: 34.8 %
MCH RBC QN AUTO: 27.2 PG (ref 26.5–33)
MCHC RBC AUTO-ENTMCNC: 33 G/DL (ref 31.5–36.5)
MCV RBC AUTO: 82 FL (ref 78–100)
MONOCYTES # BLD AUTO: 0.8 10E9/L (ref 0–1.3)
MONOCYTES NFR BLD AUTO: 6.4 %
NEUTROPHILS # BLD AUTO: 6.4 10E9/L (ref 1.6–8.3)
NEUTROPHILS NFR BLD AUTO: 55.1 %
NITRATE UR QL: NEGATIVE
NRBC # BLD AUTO: 0 10*3/UL
NRBC BLD AUTO-RTO: 0 /100
PH UR STRIP: 5 PH (ref 4.7–8)
PLATELET # BLD AUTO: 123 10E9/L (ref 150–450)
POTASSIUM SERPL-SCNC: 3.6 MMOL/L (ref 3.4–5.3)
PROT SERPL-MCNC: 8.4 G/DL (ref 6.8–8.8)
RBC # BLD AUTO: 5.18 10E12/L (ref 3.8–5.2)
SODIUM SERPL-SCNC: 138 MMOL/L (ref 133–144)
SOURCE: NORMAL
SP GR UR STRIP: 1.01 (ref 1–1.03)
UROBILINOGEN UR STRIP-MCNC: NORMAL MG/DL (ref 0–2)
WBC # BLD AUTO: 11.7 10E9/L (ref 4–11)

## 2019-09-04 PROCEDURE — 99285 EMERGENCY DEPT VISIT HI MDM: CPT | Mod: 25

## 2019-09-04 PROCEDURE — 85025 COMPLETE CBC W/AUTO DIFF WBC: CPT | Performed by: PHYSICIAN ASSISTANT

## 2019-09-04 PROCEDURE — 83605 ASSAY OF LACTIC ACID: CPT | Performed by: PHYSICIAN ASSISTANT

## 2019-09-04 PROCEDURE — 81003 URINALYSIS AUTO W/O SCOPE: CPT | Performed by: PHYSICIAN ASSISTANT

## 2019-09-04 PROCEDURE — 85049 AUTOMATED PLATELET COUNT: CPT | Performed by: INTERNAL MEDICINE

## 2019-09-04 PROCEDURE — 74019 RADEX ABDOMEN 2 VIEWS: CPT | Mod: TC

## 2019-09-04 PROCEDURE — 99284 EMERGENCY DEPT VISIT MOD MDM: CPT | Mod: Z6 | Performed by: PHYSICIAN ASSISTANT

## 2019-09-04 PROCEDURE — 74176 CT ABD & PELVIS W/O CONTRAST: CPT | Mod: TC

## 2019-09-04 PROCEDURE — 80053 COMPREHEN METABOLIC PANEL: CPT | Performed by: PHYSICIAN ASSISTANT

## 2019-09-04 PROCEDURE — 83036 HEMOGLOBIN GLYCOSYLATED A1C: CPT | Performed by: INTERNAL MEDICINE

## 2019-09-04 PROCEDURE — 40000788 ZZHCL STATISTIC ESTIMATED AVERAGE GLUCOSE: Performed by: INTERNAL MEDICINE

## 2019-09-04 PROCEDURE — 82565 ASSAY OF CREATININE: CPT | Performed by: INTERNAL MEDICINE

## 2019-09-05 ENCOUNTER — APPOINTMENT (OUTPATIENT)
Dept: GENERAL RADIOLOGY | Facility: HOSPITAL | Age: 74
DRG: 392 | End: 2019-09-05
Attending: INTERNAL MEDICINE
Payer: MEDICARE

## 2019-09-05 PROBLEM — K65.1 INTRA-ABDOMINAL ABSCESS (H): Status: ACTIVE | Noted: 2019-09-05

## 2019-09-05 LAB
ANION GAP SERPL CALCULATED.3IONS-SCNC: 10 MMOL/L (ref 3–14)
BASOPHILS # BLD AUTO: 0 10E9/L (ref 0–0.2)
BASOPHILS NFR BLD AUTO: 0.3 %
BUN SERPL-MCNC: 15 MG/DL (ref 7–30)
CALCIUM SERPL-MCNC: 8.7 MG/DL (ref 8.5–10.1)
CHLORIDE SERPL-SCNC: 104 MMOL/L (ref 94–109)
CO2 SERPL-SCNC: 25 MMOL/L (ref 20–32)
CREAT SERPL-MCNC: 1.13 MG/DL (ref 0.52–1.04)
DIFFERENTIAL METHOD BLD: ABNORMAL
EOSINOPHIL # BLD AUTO: 0.3 10E9/L (ref 0–0.7)
EOSINOPHIL NFR BLD AUTO: 3.3 %
ERYTHROCYTE [DISTWIDTH] IN BLOOD BY AUTOMATED COUNT: 13.5 % (ref 10–15)
EST. AVERAGE GLUCOSE BLD GHB EST-MCNC: 174 MG/DL
GFR SERPL CREATININE-BSD FRML MDRD: 48 ML/MIN/{1.73_M2}
GLUCOSE BLDC GLUCOMTR-MCNC: 105 MG/DL (ref 70–99)
GLUCOSE BLDC GLUCOMTR-MCNC: 112 MG/DL (ref 70–99)
GLUCOSE BLDC GLUCOMTR-MCNC: 121 MG/DL (ref 70–99)
GLUCOSE BLDC GLUCOMTR-MCNC: 131 MG/DL (ref 70–99)
GLUCOSE BLDC GLUCOMTR-MCNC: 162 MG/DL (ref 70–99)
GLUCOSE SERPL-MCNC: 163 MG/DL (ref 70–99)
HBA1C MFR BLD: 7.7 % (ref 0–5.6)
HCT VFR BLD AUTO: 38.4 % (ref 35–47)
HGB BLD-MCNC: 12.7 G/DL (ref 11.7–15.7)
IMM GRANULOCYTES # BLD: 0 10E9/L (ref 0–0.4)
IMM GRANULOCYTES NFR BLD: 0.3 %
LYMPHOCYTES # BLD AUTO: 2.9 10E9/L (ref 0.8–5.3)
LYMPHOCYTES NFR BLD AUTO: 30.6 %
MCH RBC QN AUTO: 26.8 PG (ref 26.5–33)
MCHC RBC AUTO-ENTMCNC: 33.1 G/DL (ref 31.5–36.5)
MCV RBC AUTO: 81 FL (ref 78–100)
MONOCYTES # BLD AUTO: 0.7 10E9/L (ref 0–1.3)
MONOCYTES NFR BLD AUTO: 7.5 %
NEUTROPHILS # BLD AUTO: 5.5 10E9/L (ref 1.6–8.3)
NEUTROPHILS NFR BLD AUTO: 58 %
NRBC # BLD AUTO: 0 10*3/UL
NRBC BLD AUTO-RTO: 0 /100
PLATELET # BLD AUTO: 101 10E9/L (ref 150–450)
POTASSIUM SERPL-SCNC: 3.4 MMOL/L (ref 3.4–5.3)
RBC # BLD AUTO: 4.74 10E12/L (ref 3.8–5.2)
SODIUM SERPL-SCNC: 139 MMOL/L (ref 133–144)
WBC # BLD AUTO: 9.5 10E9/L (ref 4–11)

## 2019-09-05 PROCEDURE — 12000000 ZZH R&B MED SURG/OB

## 2019-09-05 PROCEDURE — 25000128 H RX IP 250 OP 636: Performed by: INTERNAL MEDICINE

## 2019-09-05 PROCEDURE — 36415 COLL VENOUS BLD VENIPUNCTURE: CPT | Performed by: INTERNAL MEDICINE

## 2019-09-05 PROCEDURE — 00000146 ZZHCL STATISTIC GLUCOSE BY METER IP

## 2019-09-05 PROCEDURE — 25000132 ZZH RX MED GY IP 250 OP 250 PS 637: Mod: GY | Performed by: INTERNAL MEDICINE

## 2019-09-05 PROCEDURE — 40000786 ZZHCL STATISTIC ACTIVE MRSA SURVEILLANCE CULTURE: Performed by: SURGERY

## 2019-09-05 PROCEDURE — 74019 RADEX ABDOMEN 2 VIEWS: CPT | Mod: TC

## 2019-09-05 PROCEDURE — 99232 SBSQ HOSP IP/OBS MODERATE 35: CPT | Mod: 25 | Performed by: SURGERY

## 2019-09-05 PROCEDURE — 99223 1ST HOSP IP/OBS HIGH 75: CPT | Mod: AI | Performed by: INTERNAL MEDICINE

## 2019-09-05 PROCEDURE — 25000128 H RX IP 250 OP 636: Performed by: SURGERY

## 2019-09-05 PROCEDURE — 96365 THER/PROPH/DIAG IV INF INIT: CPT

## 2019-09-05 PROCEDURE — 25800030 ZZH RX IP 258 OP 636: Performed by: INTERNAL MEDICINE

## 2019-09-05 PROCEDURE — 80048 BASIC METABOLIC PNL TOTAL CA: CPT | Performed by: INTERNAL MEDICINE

## 2019-09-05 PROCEDURE — 85025 COMPLETE CBC W/AUTO DIFF WBC: CPT | Performed by: INTERNAL MEDICINE

## 2019-09-05 PROCEDURE — 25000132 ZZH RX MED GY IP 250 OP 250 PS 637: Mod: GY | Performed by: SURGERY

## 2019-09-05 RX ORDER — GABAPENTIN 300 MG/1
300 CAPSULE ORAL 2 TIMES DAILY
Status: DISCONTINUED | OUTPATIENT
Start: 2019-09-05 | End: 2019-09-06 | Stop reason: HOSPADM

## 2019-09-05 RX ORDER — NALOXONE HYDROCHLORIDE 0.4 MG/ML
.1-.4 INJECTION, SOLUTION INTRAMUSCULAR; INTRAVENOUS; SUBCUTANEOUS
Status: DISCONTINUED | OUTPATIENT
Start: 2019-09-05 | End: 2019-09-06 | Stop reason: HOSPADM

## 2019-09-05 RX ORDER — MAGNESIUM SULFATE HEPTAHYDRATE 40 MG/ML
4 INJECTION, SOLUTION INTRAVENOUS EVERY 4 HOURS PRN
Status: DISCONTINUED | OUTPATIENT
Start: 2019-09-05 | End: 2019-09-06 | Stop reason: HOSPADM

## 2019-09-05 RX ORDER — DOCUSATE SODIUM 100 MG/1
100 CAPSULE, LIQUID FILLED ORAL DAILY
COMMUNITY

## 2019-09-05 RX ORDER — METOPROLOL TARTRATE 25 MG/1
25 TABLET, FILM COATED ORAL DAILY
Status: DISCONTINUED | OUTPATIENT
Start: 2019-09-05 | End: 2019-09-06 | Stop reason: HOSPADM

## 2019-09-05 RX ORDER — ACETAMINOPHEN 325 MG/1
650 TABLET ORAL EVERY 6 HOURS PRN
COMMUNITY

## 2019-09-05 RX ORDER — GABAPENTIN 600 MG/1
600 TABLET ORAL AT BEDTIME
Status: DISCONTINUED | OUTPATIENT
Start: 2019-09-05 | End: 2019-09-06 | Stop reason: HOSPADM

## 2019-09-05 RX ORDER — SUCRALFATE 1 G/1
1 TABLET ORAL
Status: DISCONTINUED | OUTPATIENT
Start: 2019-09-05 | End: 2019-09-06 | Stop reason: HOSPADM

## 2019-09-05 RX ORDER — NALOXEGOL OXALATE 25 MG/1
25 TABLET, FILM COATED ORAL PRN
Refills: 0 | COMMUNITY
Start: 2019-05-22 | End: 2020-11-02

## 2019-09-05 RX ORDER — POTASSIUM CHLORIDE 1.5 G/1.58G
20-40 POWDER, FOR SOLUTION ORAL
Status: DISCONTINUED | OUTPATIENT
Start: 2019-09-05 | End: 2019-09-06 | Stop reason: HOSPADM

## 2019-09-05 RX ORDER — DEXTROSE MONOHYDRATE 25 G/50ML
25-50 INJECTION, SOLUTION INTRAVENOUS
Status: DISCONTINUED | OUTPATIENT
Start: 2019-09-05 | End: 2019-09-06 | Stop reason: HOSPADM

## 2019-09-05 RX ORDER — ONDANSETRON 4 MG/1
4 TABLET, ORALLY DISINTEGRATING ORAL EVERY 6 HOURS PRN
Status: DISCONTINUED | OUTPATIENT
Start: 2019-09-05 | End: 2019-09-06 | Stop reason: HOSPADM

## 2019-09-05 RX ORDER — ONDANSETRON 2 MG/ML
4 INJECTION INTRAMUSCULAR; INTRAVENOUS EVERY 6 HOURS PRN
Status: DISCONTINUED | OUTPATIENT
Start: 2019-09-05 | End: 2019-09-06 | Stop reason: HOSPADM

## 2019-09-05 RX ORDER — ASPIRIN AND DIPYRIDAMOLE 25; 200 MG/1; MG/1
1 CAPSULE, EXTENDED RELEASE ORAL DAILY
Status: DISCONTINUED | OUTPATIENT
Start: 2019-09-05 | End: 2019-09-06 | Stop reason: HOSPADM

## 2019-09-05 RX ORDER — HYDROMORPHONE HYDROCHLORIDE 1 MG/ML
0.3 INJECTION, SOLUTION INTRAMUSCULAR; INTRAVENOUS; SUBCUTANEOUS
Status: DISCONTINUED | OUTPATIENT
Start: 2019-09-05 | End: 2019-09-06 | Stop reason: HOSPADM

## 2019-09-05 RX ORDER — POTASSIUM CL/LIDO/0.9 % NACL 10MEQ/0.1L
10 INTRAVENOUS SOLUTION, PIGGYBACK (ML) INTRAVENOUS
Status: DISCONTINUED | OUTPATIENT
Start: 2019-09-05 | End: 2019-09-06 | Stop reason: HOSPADM

## 2019-09-05 RX ORDER — POTASSIUM CHLORIDE 1500 MG/1
20-40 TABLET, EXTENDED RELEASE ORAL
Status: DISCONTINUED | OUTPATIENT
Start: 2019-09-05 | End: 2019-09-06 | Stop reason: HOSPADM

## 2019-09-05 RX ORDER — NICOTINE POLACRILEX 4 MG
15-30 LOZENGE BUCCAL
Status: DISCONTINUED | OUTPATIENT
Start: 2019-09-05 | End: 2019-09-06 | Stop reason: HOSPADM

## 2019-09-05 RX ORDER — HYDROXYCHLOROQUINE SULFATE 200 MG/1
200 TABLET, FILM COATED ORAL 2 TIMES DAILY
COMMUNITY

## 2019-09-05 RX ORDER — POTASSIUM CHLORIDE 7.45 MG/ML
10 INJECTION INTRAVENOUS
Status: DISCONTINUED | OUTPATIENT
Start: 2019-09-05 | End: 2019-09-06 | Stop reason: HOSPADM

## 2019-09-05 RX ORDER — PANTOPRAZOLE SODIUM 40 MG/1
40 FOR SUSPENSION ORAL
Status: DISCONTINUED | OUTPATIENT
Start: 2019-09-05 | End: 2019-09-06 | Stop reason: HOSPADM

## 2019-09-05 RX ORDER — LIDOCAINE 40 MG/G
CREAM TOPICAL
Status: DISCONTINUED | OUTPATIENT
Start: 2019-09-05 | End: 2019-09-06 | Stop reason: HOSPADM

## 2019-09-05 RX ORDER — SODIUM CHLORIDE, SODIUM LACTATE, POTASSIUM CHLORIDE, CALCIUM CHLORIDE 600; 310; 30; 20 MG/100ML; MG/100ML; MG/100ML; MG/100ML
INJECTION, SOLUTION INTRAVENOUS CONTINUOUS
Status: ACTIVE | OUTPATIENT
Start: 2019-09-05 | End: 2019-09-06

## 2019-09-05 RX ADMIN — GABAPENTIN 300 MG: 300 CAPSULE ORAL at 12:11

## 2019-09-05 RX ADMIN — SODIUM CHLORIDE, POTASSIUM CHLORIDE, SODIUM LACTATE AND CALCIUM CHLORIDE: 600; 310; 30; 20 INJECTION, SOLUTION INTRAVENOUS at 12:10

## 2019-09-05 RX ADMIN — HYDROMORPHONE HYDROCHLORIDE 0.3 MG: 1 INJECTION, SOLUTION INTRAMUSCULAR; INTRAVENOUS; SUBCUTANEOUS at 02:35

## 2019-09-05 RX ADMIN — PANTOPRAZOLE SODIUM 40 MG: 40 GRANULE, DELAYED RELEASE ORAL at 08:57

## 2019-09-05 RX ADMIN — HYDROMORPHONE HYDROCHLORIDE 0.3 MG: 1 INJECTION, SOLUTION INTRAMUSCULAR; INTRAVENOUS; SUBCUTANEOUS at 12:45

## 2019-09-05 RX ADMIN — SUCRALFATE 1 G: 1 TABLET ORAL at 16:02

## 2019-09-05 RX ADMIN — TAZOBACTAM SODIUM AND PIPERACILLIN SODIUM 3.38 G: 375; 3 INJECTION, SOLUTION INTRAVENOUS at 00:38

## 2019-09-05 RX ADMIN — SUCRALFATE 1 G: 1 TABLET ORAL at 21:12

## 2019-09-05 RX ADMIN — SODIUM CHLORIDE, POTASSIUM CHLORIDE, SODIUM LACTATE AND CALCIUM CHLORIDE: 600; 310; 30; 20 INJECTION, SOLUTION INTRAVENOUS at 02:00

## 2019-09-05 RX ADMIN — ASPIRIN AND EXTENDED-RELEASE DIPYRIDAMOLE 1 CAPSULE: 25; 200 CAPSULE ORAL at 09:18

## 2019-09-05 RX ADMIN — ENOXAPARIN SODIUM 40 MG: 40 INJECTION SUBCUTANEOUS at 21:18

## 2019-09-05 RX ADMIN — HYDROMORPHONE HYDROCHLORIDE 0.3 MG: 1 INJECTION, SOLUTION INTRAMUSCULAR; INTRAVENOUS; SUBCUTANEOUS at 20:07

## 2019-09-05 RX ADMIN — GABAPENTIN 600 MG: 600 TABLET, FILM COATED ORAL at 21:12

## 2019-09-05 RX ADMIN — ENOXAPARIN SODIUM 40 MG: 40 INJECTION SUBCUTANEOUS at 02:26

## 2019-09-05 RX ADMIN — POTASSIUM CHLORIDE 20 MEQ: 20 TABLET, EXTENDED RELEASE ORAL at 21:12

## 2019-09-05 RX ADMIN — TAZOBACTAM SODIUM AND PIPERACILLIN SODIUM 3.38 G: 375; 3 INJECTION, SOLUTION INTRAVENOUS at 15:54

## 2019-09-05 RX ADMIN — TAZOBACTAM SODIUM AND PIPERACILLIN SODIUM 3.38 G: 375; 3 INJECTION, SOLUTION INTRAVENOUS at 09:26

## 2019-09-05 RX ADMIN — METOPROLOL TARTRATE 25 MG: 25 TABLET ORAL at 08:57

## 2019-09-05 RX ADMIN — INSULIN DEGLUDEC INJECTION 10 UNITS: 100 INJECTION, SOLUTION SUBCUTANEOUS at 21:17

## 2019-09-05 RX ADMIN — GABAPENTIN 300 MG: 300 CAPSULE ORAL at 08:57

## 2019-09-05 RX ADMIN — GABAPENTIN 600 MG: 600 TABLET, FILM COATED ORAL at 02:34

## 2019-09-05 ASSESSMENT — ACTIVITIES OF DAILY LIVING (ADL)
ADLS_ACUITY_SCORE: 12
ADLS_ACUITY_SCORE: 12
ADLS_ACUITY_SCORE: 11
ADLS_ACUITY_SCORE: 11
ADLS_ACUITY_SCORE: 12

## 2019-09-05 ASSESSMENT — MIFFLIN-ST. JEOR: SCORE: 1291.75

## 2019-09-05 NOTE — ED PROVIDER NOTES
History     Chief Complaint   Patient presents with     Abdominal Pain     c/o abd cramping and diarrhea since alst week. notes hx of small bowel obstruction. denies nausea or vomiting     HPI  Cee Pereira is a 73 year old female who presents emergency department with complaints of abdominal pain for the last week with associated mild nausea.  Patient describes crampy abdominal pain after lunch.  She states that her last meal was at 12 PM and she had been drinking Gatorade for the last couple hours prior to being seen.  Patient has a history of prior colectomy and generally has diarrhea.  She denies bloody stools, fever, vomiting.  She has not had shortness of breath, lightheadedness, chest pain.    Patient has a history of prior small bowel obstruction, Crohn's disease, type 2 diabetes.  Patient states that she quit smoking quite some time ago.    Allergies:  Allergies   Allergen Reactions     Diatrizoate      Other reaction(s): *Unknown     Iodides      Other reaction(s): *Unknown  Tincture     Ivp Dye [Contrast Dye]      Anaphylaxis     Lortab [Hydrocodone-Acetaminophen] Itching     headaches     No Clinical Screening - See Comments Itching     Pioglitazone Itching       Problem List:    Patient Active Problem List    Diagnosis Date Noted     Intra-abdominal abscess (H) 09/05/2019     Priority: Medium     LUQ abdominal pain 09/18/2018     Priority: Medium     GERD (gastroesophageal reflux disease) 09/18/2018     Priority: Medium     HTN (hypertension) 09/18/2018     Priority: Medium     Hyperlipidemia 09/18/2018     Priority: Medium     Viral gastroenteritis 05/23/2017     Priority: Medium     Dehydration 05/23/2017     Priority: Medium     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) 05/23/2017     Priority: Medium     Crohn's disease of large intestine without complication (H) 05/23/2017     Priority: Medium     Gastroenteritis 05/23/2017     Priority: Medium     Small bowel obstruction (H)  08/17/2016     Priority: Medium     SBO (small bowel obstruction) (H) 08/17/2016     Priority: Medium     Lumbar stenosis 07/30/2013     Priority: Medium     Lumbar disc herniation with radiculopathy 08/03/2012     Priority: Medium        Past Medical History:    Past Medical History:   Diagnosis Date     Diabetes mellitus (H)      Diverticulitis      Herniated lumbar intervertebral disc        Past Surgical History:    Past Surgical History:   Procedure Laterality Date     APPENDECTOMY OPEN       CHOLECYSTECTOMY       COLECTOMY WITHOUT COLOSTOMY      for diverticulitis     DECOMPRESSION LUMBAR ONE LEVEL  7/31/2013    L4-L5     ESOPHAGOSCOPY, GASTROSCOPY, DUODENOSCOPY (EGD), COMBINED N/A 9/6/2019    Procedure: UPPER ENDOSCOPY WITH BIOPSY;  Surgeon: Froy Barraza MD;  Location: [facility]     JOINT REPLACEMENT      bilateral hip replacements       Family History:    No family history on file.    Social History:  Marital Status:   [2]  Social History     Tobacco Use     Smoking status: Former Smoker     Smokeless tobacco: Never Used   Substance Use Topics     Alcohol use: No     Drug use: Not on file        Medications:      acetaminophen (TYLENOL) 325 MG tablet   aspirin-dipyridamole (AGGRENOX)  MG per 12 hr capsule   Coenzyme Q10 (COQ-10 PO)   docusate sodium (COLACE) 100 MG capsule   GABAPENTIN PO   GABAPENTIN PO   hydroxychloroquine (PLAQUENIL) 200 MG tablet   insulin degludec (TRESIBA FLEXTOUCH) 100 UNIT/ML pen   METOPROLOL TARTRATE PO   PANTOPRAZOLE SODIUM PO   SIMVASTATIN PO   sucralfate (CARAFATE) 1 GM tablet   MOVANTIK 25 MG TABS tablet         Review of Systems   Constitutional: Negative for fever.   Respiratory: Negative for cough and shortness of breath.    Cardiovascular: Negative for chest pain.   Gastrointestinal: Positive for abdominal pain, diarrhea and nausea. Negative for blood in stool and vomiting.   Genitourinary: Negative.        Physical Exam   BP: 173/82  Heart Rate: 74  Temp:  "97.7  F (36.5  C)  Resp: 16  Height: 167.6 cm (5' 6\")  Weight: 76.5 kg (168 lb 10.4 oz)  SpO2: 99 %      Physical Exam   Constitutional: She is oriented to person, place, and time. No distress.   HENT:   Head: Atraumatic.   Mouth/Throat: Oropharynx is clear and moist. No oropharyngeal exudate.   Eyes: Pupils are equal, round, and reactive to light. No scleral icterus.   Cardiovascular: Normal heart sounds and intact distal pulses.   Pulmonary/Chest: Breath sounds normal. No respiratory distress.   Abdominal: Soft. Bowel sounds are decreased. There is tenderness (mid abdominal tenderness to palpation without guarding). A hernia (questionable presence of hernia adjacent to incision site near umbilicus.) is present.   Musculoskeletal: She exhibits no edema or tenderness.   Neurological: She is alert and oriented to person, place, and time.   Skin: Skin is warm. No rash noted. She is not diaphoretic.   Psychiatric: She has a normal mood and affect. Her behavior is normal.       ED Course        Procedures  Symptoms concerning for possible small bowel obstruction or incarcerated hernia though tenderness was not such as to suggest incarcerated hernia.  CT significant for              No results found for this or any previous visit (from the past 24 hour(s)).  Medications   lactated ringers infusion ( Intravenous New Bag 9/6/19 1126)       Assessments & Plan (with Medical Decision Making)     I have reviewed the nursing notes.    I have reviewed the findings, diagnosis, plan and need for follow up with the patient.  Patient signed out to Dr. Hernandez pending CT abdomen interpretation at 2230.  CT vrad reported fluid collection , possible abd abcess, spoke to Dr Barraza, recommended admission to medicine , he will follow at AM    Discharge Medication List as of 9/6/2019  4:52 PM      START taking these medications    Details   sucralfate (CARAFATE) 1 GM tablet Take 1 tablet (1 g) by mouth 4 times daily (before meals and " nightly), Disp-120 tablet, R-0, E-Prescribe             Final diagnoses:   Intra-abdominal abscess (H)     Denzel Hernandez MD on 9/6/2019 at 4:57 PM   9/4/2019   [facility] EM[facility]GENCY [facility]PART[facility]     Doyle Steiner PA  09/06/19 1657       Denzel Hernandez MD  09/10/19 2014       Denzel Hernandez MD  09/10/19 2015

## 2019-09-05 NOTE — PLAN OF CARE
[facility] Inpatient Admission Note:    Patient admitted to 3222/3222-1 at approximately 0145 via wheel chair accompanied by self from emergency room . Report received from Belen GARDINER RN in SBAR format at 0130 via telephone. Patient transferred to bed via self.. Patient is alert and oriented X 3, reports pain; rates at 4 on 0-10 scale.  Patient oriented to room, unit, hourly rounding, and plan of care. Explained admission packet and patient handbook with patient bill of rights brochure. Will continue to monitor and document as needed.     Inpatient Nursing criteria listed below was met:    Health care directives status obtained and documented: Yes    Care Everywhere authorization obtained No    MRSA swab completed for patient 65 years and older: Yes    Patient identifies a surrogate decision maker: Yes If yes, who:Greg;  Contact Information:[phone]    Core Measure diagnosis present:No.      If initial lactic acid >2.0, repeat lactic acid drawn within one hour of arrival to unit: NA. If no, state reason: NA    Vaccination assessment and education completed: Yes   Vaccinations received prior to admission: Pneumovax yes  Influenza(seasonal)  YES   Vaccination(s) ordered: NA    Clergy visit ordered if patient requests: N/A    Skin issues/needs documented: N/A    Isolation Patient: no Education given, correct sign in place and documentation row added to PCS:  NA    Fall Prevention Yes: Care plan updated, education given and documented, sticker and magnet in place: Yes    Care Plan initiated: Yes    Education Documented (including assessment): Yes    Patient has discharge needs : No If yes, please explain:NA

## 2019-09-05 NOTE — PROGRESS NOTES
Assessment completed see flowsheet.    LOC: alert and oriented, very pleasant and conversational  Others present: Patient and her  Greg    Dx: intra abdominal abscess  Chronic Disease Management: HTN, Diabetes    Lives with: Greg  Living at:  Home in [place]  Transportation: YES They both drive    Primary PCP: Matthew Villela  Insurance:  Medicare and [employer] Medicare IM letter reviewed with her this morning.    Support System:  Family and friends  Homecare/PCA: none  /County Services:   none  [place]: NO But Greg is     VA Referral line called: no    Health Care Directive: YES list Greg and their sons Kike and Ming  Guardian: na  POA: possibly Kike    Pharmacy: [facility]  Meds management: YES manages independently without difficulty    Adequate Resources for needs (housing, utilities, food/med): YES  Household chores: independent, shared with Greg  Work/community/social activity: YES as desired    ADLs: independent  Ambulation:independent, sometimes uses a cane or walker with longer walks such as when out in the community  Falls: none  Nutrition: independent with shopping and meal prep, no special diet followed  Sleep: sleeps in a bed at night    Equipment used: none      Oxygen supplier: na      Does the supplier have valid oxygen orders: na    Mental health: denies concern  Substance abuse: no concerns  Exposure to violence/abuse: not asked at this time  Stressors: none voiced    Able to Return to Prior Living Arrangements: YES    Choice of Vendor: na    Barriers: none known    XIMENA: none    Plan: she plans to return home via Greg, does not anticipate any other needs.

## 2019-09-05 NOTE — PROGRESS NOTES
Hospitalist brief progress note    Patient seen at bedside.  Please see H&P dated 9/5/2019 by Dr. Hdez for billing purposes.  Patient presents with periumbilical pain for approximately 1 week.  CT scan reveals possible small bowel obstruction, possible abdominal wall abscess.  Surgery consultation is pending.  Continue with IV Unasyn for the time being.  N.p.o. status until surgery can evaluate.    Jaime English MD

## 2019-09-05 NOTE — PLAN OF CARE
"Patient VSS afebrile, self reported \"confusion\" but patient is A&O x 4. Makes needs known and calls appropriately. Pain reported in abdomen post food consumption; relieved with Sub Q Dilaudid. MD in to discuss plan with patient. Clear liquid diet, to advance as tolerated. Up ad-violeta; makes needs known. Bed low and locked, ID band on, fall precautions maintained.     Face to face report given with opportunity to observe patient.    Report given to AYAKA Lynne RN   9/5/2019  4:40 PM   "

## 2019-09-05 NOTE — H&P
"[facility]    History and Physical  Hospitalist       Date of Admission:  9/4/2019    Assessment & Plan   Cee Pereira is a 73 year old female who presents with periumbilical pain for a week. PMH of total colectomy, SBO, HTN, DM-2    Principal Problem:    Intra-abdominal abscess (H)    Assessment: This is our working diagnosis. Differential includes non-inflammatory fluid collection.     Plan: Unasyn (zosyn given in ED)   Surgery consulted (dr. Barraza will see the patient in AM)   CRP, procalcitonin    Active Problems:    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)    Assessment: present on admission. Pt uses insulin.     Plan: clear liquid diabetic diet, sliding scale and lower dose of long acting.      HTN (hypertension)    Assessment: treated on presentation    Plan: continue home BP control meds.     Acute kidney injury  Assessment; present on admission, most likely pre-renal  Plan: NS, monitor renal function.     Thrombocytopenia  Assessment: chronic, mild  Plan: only monitoring required. Stop lovenox if < 100    DVT Prophylaxis: Enoxaparin (Lovenox) SQ  Code Status: Full Code    Disposition: Expected discharge in a few  days once stable.    Jesus Hdez    Primary Care Physician   Matthew Villela    Chief Complaint  abdominal pain    Reason for Admission: intraabdominal abscess     Admission status: inpatient    History is obtained from the patient, electronic health record and emergency department physician    History of Present Illness   Cee Pereira is a 73 year old women with PMH of SBO, colectomy, DM-2 on insulin, HTN, recent admission (5.2019) for SBO, presents to ED with 1 week lasting abdominal pain. Located periumbilical, radiating down. Dull and achy. 7/10 when at max. Took tylenol with some relief. Denies N/V. Admits feeling fatigued. Admits loose BM (but this is \"normal\" to her since colectomy). Denies Fever, chills, bloody diarrhea. Admits shivers. No " documented fevers. Last oral intake was the day prior to admission and continues to have loose BM      ED events: no acute events. Hemodynamically stable.     ED diagnostic workup: Cr 1.21 (base ~1.0-0.9)  WBC 11.7 Hg 14.1 Plat 125     ED imaging studies:   XR abdomen: There are surgical staples in the abdomen. Again seen are changes of bilateral hip arthroplasty. The nasogastric tube present on the prior study is been removed. There is no evidence of bowel obstruction or free air.    CT abdomen (preliminary): surgical changes to the anterior abdomen with several adhesed bowel loops and small ventral wall hernia; no obstructive changes, otherwise visible. Question small fluid collection or peritoneal side of the area possibly abscess. Only prior reports , no images available at time of interpretation.     ED treatment: NS, and zosyn.     Past Medical History    I have reviewed this patient's medical history and updated it with pertinent information if needed.   Past Medical History:   Diagnosis Date     Diabetes mellitus (H)      Diverticulitis      Herniated lumbar intervertebral disc        Past Surgical History   I have reviewed this patient's surgical history and updated it with pertinent information if needed.  Past Surgical History:   Procedure Laterality Date     APPENDECTOMY OPEN       CHOLECYSTECTOMY       COLECTOMY WITHOUT COLOSTOMY      for diverticulitis     DECOMPRESSION LUMBAR ONE LEVEL  7/31/2013    L4-L5     JOINT REPLACEMENT      bilateral hip replacements       Prior to Admission Medications   Prior to Admission Medications   Prescriptions Last Dose Informant Patient Reported? Taking?   Coenzyme Q10 (COQ-10 PO) 9/4/2019 at Unknown time  Yes Yes   Sig: Take 100 mg by mouth daily   GABAPENTIN PO 9/4/2019 at Unknown time  Yes Yes   Sig: Take 300 mg by mouth 2 times daily AM, second dose at 1200   GABAPENTIN PO Past Week at Unknown time  Yes Yes   Sig: Take 600 mg by mouth At Bedtime   METOPROLOL  TARTRATE PO 9/4/2019 at Unknown time  Yes Yes   Sig: Take 25 mg by mouth daily    PANTOPRAZOLE SODIUM PO 9/4/2019 at Unknown time  Yes Yes   Sig: Take 40 mg by mouth every morning (before breakfast)   SIMVASTATIN PO 9/4/2019 at Unknown time  Yes Yes   Sig: Take 10 mg by mouth daily   acetaminophen (TYLENOL) 325 MG tablet 9/4/2019 at Unknown time  Yes Yes   Sig: Take 650 mg by mouth every 6 hours as needed for mild pain   aspirin-dipyridamole (AGGRENOX)  MG per 12 hr capsule 9/4/2019 at Unknown time  Yes Yes   Sig: Take 1 capsule by mouth daily   docusate sodium (COLACE) 100 MG capsule Past Week at Unknown time  Yes Yes   Sig: Take 100 mg by mouth daily   hydroxychloroquine (PLAQUENIL) 200 MG tablet 9/4/2019 at Unknown time  Yes Yes   Sig: Take 200 mg by mouth 2 times daily   insulin degludec (TRESIBA FLEXTOUCH) 100 UNIT/ML pen Past Week at Unknown time  Yes Yes   Sig: Inject 27 Units Subcutaneous At Bedtime       Facility-Administered Medications: None     Allergies   Allergies   Allergen Reactions     Diatrizoate      Other reaction(s): *Unknown     Iodides      Other reaction(s): *Unknown  Tincture     Ivp Dye [Contrast Dye]      Anaphylaxis     Lortab [Hydrocodone-Acetaminophen] Itching     headaches     No Clinical Screening - See Comments Itching     Pioglitazone Itching       Social History   I have reviewed this patient's social history and updated it with pertinent information if needed. Cee Pereira  reports that she has quit smoking. She has never used smokeless tobacco. She reports that she does not drink alcohol.    Family History   I have reviewed this patient's family history and updated it with pertinent information if needed.   No family history on file.    Review of Systems   All 14 Systems were reviewed and found to be negative except what's mentioned in HPI.     Physical Exam   Temp: 97.8  F (36.6  C) Temp src: Temporal BP: 128/65   Heart Rate: 79 Resp: 16 SpO2: 96 % O2 Device: None (Room  air)    Vital Signs with Ranges  Temp:  [97.7  F (36.5  C)-98.5  F (36.9  C)] 97.8  F (36.6  C)  Heart Rate:  [74-79] 79  Resp:  [16] 16  BP: (128-173)/(65-82) 128/65  SpO2:  [94 %-99 %] 96 %  168 lbs 10.43 oz    Physical exam:   GENERAL: Awake, alert, in no distress.   HEENT: NC/AT. MMM. OP clear.   NECK: trachea midline, no JVD.   CARDIAC: regular, normal S1,S2, no S3. No murmurs, gallops or rubs. No bruits in the neck. No peripheral edema.    PULMONARY: normal vesicular breath sounds bilaterally.  GI: multiple scars. BS present, normoactive. Tender periumbilical area. No rebound no guarding. No pulsatile masses.   : CVA not tender, bladder not palpable.  MUSCULOSKELETAL: major joints without effusion, full range of motion, no sarcopenia.  NEUROLOGICAL: normal muscle strength and sensory exam. DTR 2/4, no myoclonus. Gait not tested.   PSYCHIATRIC: normal affect, normal mood. Awake, alert and oriented x 4.  INTEGUMENT: no rash, no ulcerations, warm, dry.   LYMPHATIC/HEMATOLOGIC: no LAD in the neck, both axillae, and groins. No petechiae, no ecchymoses.       Goals of care were discussed with the patient and family which included but not limited to anticipated treatment course during the current hospitalization, recovery from current event, discharge planning and transitions of care responsibilities and expected outcomes. Code status was addressed on admission as well. End of life care discussion not done.    Data   Data reviewed today:   Radiologist reviewed: CT abdomen (preliminary) and XR abdomen.   See below radiology report.   Recent Labs   Lab 09/04/19  1945   WBC 11.7*   HGB 14.1   MCV 82   *      POTASSIUM 3.6   CHLORIDE 103   CO2 29   BUN 16   CR 1.21*   ANIONGAP 6   ELVIS 9.6   *   ALBUMIN 4.2   PROTTOTAL 8.4   BILITOTAL 0.3   ALKPHOS 95   ALT 32   AST 16       Recent Results (from the past 24 hour(s))   XR Abdomen 2 Views    Narrative    XR ABDOMEN 2 VW    HISTORY: 73 years Female abd  pain, prior bowel obstruction    COMPARISON: 5/6/2019    TECHNIQUE: Abdomen 3 views    FINDINGS: There are surgical staples in the abdomen. Again seen are  changes of bilateral hip arthroplasty. The nasogastric tube present on  the prior study is been removed. There is no evidence of bowel  obstruction or free air.          Impression    IMPRESSION: No evidence of bowel obstruction or free air.    KEYA DRAKE MD

## 2019-09-05 NOTE — CONSULTS
Hospital Consult  9/5/2019    Patient: Cee Pereira    Consulting Provider: Jesus Hdez MD    Admitting diagnosis: Abdominal Pain.  Questionable abscess    Expected length of stay is greater than 2 days    This is a 73 year old female with a complicated abdominal surgical history.  Has been admitted to the hospital several times with abdominal pain and small bowel obstructions.  Now presents to the emergency room with a 1 to 2-week history of increasing abdominal pain.  ` Mild fevers and chills.  While in the emergency room was found to have a slightly red a CT of the abdomen was obtained.  The initial read on the abscess showed a questionable abscess on the anterior wall.  That reason was admitted to the hospital.  Overnight she has done well.  No other complaints.         Past Medical History:  Past Medical History:   Diagnosis Date     Diabetes mellitus (H)      Diverticulitis      Herniated lumbar intervertebral disc        Past Surgical History:  Past Surgical History:   Procedure Laterality Date     APPENDECTOMY OPEN       CHOLECYSTECTOMY       COLECTOMY WITHOUT COLOSTOMY      for diverticulitis     DECOMPRESSION LUMBAR ONE LEVEL  7/31/2013    L4-L5     JOINT REPLACEMENT      bilateral hip replacements       Family History History:  No family history on file.    History of Tobacco Use:  History   Smoking Status     Former Smoker   Smokeless Tobacco     Never Used       Current Medications:  No current outpatient medications on file.       Allergies:  Allergies   Allergen Reactions     Diatrizoate      Other reaction(s): *Unknown     Iodides      Other reaction(s): *Unknown  Tincture     Ivp Dye [Contrast Dye]      Anaphylaxis     Lortab [Hydrocodone-Acetaminophen] Itching     headaches     No Clinical Screening - See Comments Itching     Pioglitazone Itching       ROS:  Pertinent items are noted in HPI.  All other systems are negative.    PHYSICAL EXAM:     Vital signs: /76   Temp 97.8  F (36.6  " C) (Temporal)   Resp 17   Ht 1.676 m (5' 6\")   Wt 76.5 kg (168 lb 10.4 oz)   SpO2 95%   BMI 27.22 kg/m     Weight: [unfilled]   BMI: Body mass index is 27.22 kg/m .   General: Normal, healthy, cooperative, in no acute distress   Skin: no jaundice   HEENT: PERRLA, EOMI and Sclera clear, anicteric   Neck: supple   Lungs: clear to auscultation   CV: Regular rate and rhythm without murmer   Abdominal: abdomen is soft without significant tenderness, masses, organomegaly or guarding   Extremities: No cyanosis, clubbing or edema noted bilaterally in Upper and Lower Extremities   Neurological: without deficit    LABORATORY:  CBC Results:   Recent Labs   Lab Test 09/05/19  0537   WBC 9.5   RBC 4.74   HGB 12.7   HCT 38.4   MCV 81   MCH 26.8   MCHC 33.1   RDW 13.5   *       Last Comprehensive Metabolic Panel:  Sodium   Date Value Ref Range Status   09/05/2019 139 133 - 144 mmol/L Final     Potassium   Date Value Ref Range Status   09/05/2019 3.4 3.4 - 5.3 mmol/L Final     Chloride   Date Value Ref Range Status   09/05/2019 104 94 - 109 mmol/L Final     Carbon Dioxide   Date Value Ref Range Status   09/05/2019 25 20 - 32 mmol/L Final     Anion Gap   Date Value Ref Range Status   09/05/2019 10 3 - 14 mmol/L Final     Glucose   Date Value Ref Range Status   09/05/2019 163 (H) 70 - 99 mg/dL Final     Urea Nitrogen   Date Value Ref Range Status   09/05/2019 15 7 - 30 mg/dL Final     Creatinine   Date Value Ref Range Status   09/05/2019 1.13 (H) 0.52 - 1.04 mg/dL Final     GFR Estimate   Date Value Ref Range Status   09/05/2019 48 (L) >60 mL/min/[1.73_m2] Final     Comment:     Non  GFR Calc  Starting 12/18/2018, serum creatinine based estimated GFR (eGFR) will be   calculated using the Chronic Kidney Disease Epidemiology Collaboration   (CKD-EPI) equation.       Calcium   Date Value Ref Range Status   09/05/2019 8.7 8.5 - 10.1 mg/dL Final     Bilirubin Total   Date Value Ref Range Status   09/04/2019 0.3 " 0.2 - 1.3 mg/dL Final     Alkaline Phosphatase   Date Value Ref Range Status   09/04/2019 95 40 - 150 U/L Final     ALT   Date Value Ref Range Status   09/04/2019 32 0 - 50 U/L Final     AST   Date Value Ref Range Status   09/04/2019 16 0 - 45 U/L Final       RADIOLOGY:    Results for orders placed during the hospital encounter of 09/04/19   CT Abdomen Pelvis w/o Contrast    Narrative CT ABDOMEN PELVIS W/O CONTRAST    CLINICAL HISTORY: Female, age 73 years,  Abd pain, acute, generalized;    Comparison:  CT scan abdomen and pelvis 5/4/2019    TECHNIQUE:  CT was performed of the abdomen and pelvis with oral  contrast. Sagittal, coronal and axial reconstructions were reviewed.     FINDINGS:    Abdomen/Pelvis CT:  Lung Bases:  Mild dependent and peripheral atelectasis.    Esophagus/stomach: Normal.    Liver:  Normal.    Gallbladder: Surgically absent    Spleen: Normal.    Pancreas: Normal.    Adrenal Glands: Normal.    Kidneys: Normal.  Ureters: Normal.  Urinary bladder: Not well evaluated secondary to streak artifact.    Abdominal Aorta: Scattered atherosclerotic calcifications.  IVC: Normal.    Lymph Nodes: Normal.    Large and Small Bowel: Number of loops of small bowel appear to be  here to the anterior wall. Postoperative changes suggest previous  ventral wall hernia repair with mesh. Small volume of fluid is seen  along the mesh repair is present previously. No obstruction. Majority  of the colon is surgically absent.    Appendix: Not seen. Likely surgically absent.    Pelvic Organs:  Obscured by streak artifact.  Peritoneum: Extensive postoperative changes again seen in the left  hemiabdomen.  Bony structures: No acute abnormality. Partial fusion of the SI  joints. Prominent degenerative changes again seen in the lower lumbar  facet joints.    Other Findings:  Inguinal lymph nodes are normal.      Impression IMPRESSION:   Postoperative changes with a number of adherent small bowel loops  again seen along the  anterior aspect of the peritoneum at the site of  prior ventral hernia repair. No evidence of obstruction. Small volume  of fluid is again seen at the hernia repair site is unchanged.     This report is in agreement with the preliminary report.    CHRISTOPHER RAMSAY MD     I have reviewed the patients CT of the abdomen and pelvis and agree with its findings.    ASSESSMENT: This is a 73 year old female with a complicated past abdominal surgical history.  On review of her CT there are no changes in abdominal wall from 4 months ago.  Area of questionable abscess I do not feel is an abscess but is more inflammatory response around mesh.  There is no evidence of obstruction on her CT.    PLAN: At this point there does not appear to be an intra-abdominal abscess.  There is no evidence of obstruction as the patient is having bowel movements.  Also the CT is not consistent with an obstruction.  Go ahead and start to advance her diet.  We will stop her IV antibiotics.  Consider doing an upper endoscopy.  This is secondary to she does have a complaint of abdominal pain after eating.

## 2019-09-06 ENCOUNTER — ANESTHESIA EVENT (OUTPATIENT)
Dept: SURGERY | Facility: HOSPITAL | Age: 74
DRG: 392 | End: 2019-09-06
Payer: MEDICARE

## 2019-09-06 ENCOUNTER — ANESTHESIA (OUTPATIENT)
Dept: SURGERY | Facility: HOSPITAL | Age: 74
DRG: 392 | End: 2019-09-06
Payer: MEDICARE

## 2019-09-06 VITALS
HEIGHT: 66 IN | DIASTOLIC BLOOD PRESSURE: 69 MMHG | RESPIRATION RATE: 16 BRPM | SYSTOLIC BLOOD PRESSURE: 147 MMHG | WEIGHT: 168.65 LBS | OXYGEN SATURATION: 95 % | BODY MASS INDEX: 27.1 KG/M2 | TEMPERATURE: 99.4 F

## 2019-09-06 LAB
ANION GAP SERPL CALCULATED.3IONS-SCNC: 8 MMOL/L (ref 3–14)
BACTERIA SPEC CULT: NORMAL
BUN SERPL-MCNC: 12 MG/DL (ref 7–30)
CALCIUM SERPL-MCNC: 9.3 MG/DL (ref 8.5–10.1)
CHLORIDE SERPL-SCNC: 105 MMOL/L (ref 94–109)
CO2 SERPL-SCNC: 27 MMOL/L (ref 20–32)
CREAT SERPL-MCNC: 1.15 MG/DL (ref 0.52–1.04)
ERYTHROCYTE [DISTWIDTH] IN BLOOD BY AUTOMATED COUNT: 13.5 % (ref 10–15)
GFR SERPL CREATININE-BSD FRML MDRD: 47 ML/MIN/{1.73_M2}
GLUCOSE BLDC GLUCOMTR-MCNC: 118 MG/DL (ref 70–99)
GLUCOSE BLDC GLUCOMTR-MCNC: 139 MG/DL (ref 70–99)
GLUCOSE BLDC GLUCOMTR-MCNC: 145 MG/DL (ref 70–99)
GLUCOSE SERPL-MCNC: 145 MG/DL (ref 70–99)
GLUCOSE SERPL-MCNC: 149 MG/DL (ref 70–99)
HCT VFR BLD AUTO: 40.4 % (ref 35–47)
HGB BLD-MCNC: 13.4 G/DL (ref 11.7–15.7)
MCH RBC QN AUTO: 27 PG (ref 26.5–33)
MCHC RBC AUTO-ENTMCNC: 33.2 G/DL (ref 31.5–36.5)
MCV RBC AUTO: 82 FL (ref 78–100)
PLATELET # BLD AUTO: 106 10E9/L (ref 150–450)
POTASSIUM SERPL-SCNC: 4 MMOL/L (ref 3.4–5.3)
RBC # BLD AUTO: 4.96 10E12/L (ref 3.8–5.2)
SODIUM SERPL-SCNC: 140 MMOL/L (ref 133–144)
SPECIMEN SOURCE: NORMAL
WBC # BLD AUTO: 7.6 10E9/L (ref 4–11)

## 2019-09-06 PROCEDURE — 88305 TISSUE EXAM BY PATHOLOGIST: CPT | Mod: TC | Performed by: SURGERY

## 2019-09-06 PROCEDURE — 82947 ASSAY GLUCOSE BLOOD QUANT: CPT | Performed by: INTERNAL MEDICINE

## 2019-09-06 PROCEDURE — 25800030 ZZH RX IP 258 OP 636: Performed by: INTERNAL MEDICINE

## 2019-09-06 PROCEDURE — 25000132 ZZH RX MED GY IP 250 OP 250 PS 637: Mod: GY | Performed by: INTERNAL MEDICINE

## 2019-09-06 PROCEDURE — 00000146 ZZHCL STATISTIC GLUCOSE BY METER IP

## 2019-09-06 PROCEDURE — 80048 BASIC METABOLIC PNL TOTAL CA: CPT | Performed by: INTERNAL MEDICINE

## 2019-09-06 PROCEDURE — 99100 ANES PT EXTEME AGE<1 YR&>70: CPT | Performed by: NURSE ANESTHETIST, CERTIFIED REGISTERED

## 2019-09-06 PROCEDURE — 85027 COMPLETE CBC AUTOMATED: CPT | Performed by: INTERNAL MEDICINE

## 2019-09-06 PROCEDURE — 36000050 ZZH SURGERY LEVEL 2 1ST 30 MIN: Performed by: SURGERY

## 2019-09-06 PROCEDURE — 84132 ASSAY OF SERUM POTASSIUM: CPT | Performed by: INTERNAL MEDICINE

## 2019-09-06 PROCEDURE — 99239 HOSP IP/OBS DSCHRG MGMT >30: CPT | Mod: 25 | Performed by: INTERNAL MEDICINE

## 2019-09-06 PROCEDURE — 25800030 ZZH RX IP 258 OP 636: Performed by: ANESTHESIOLOGY

## 2019-09-06 PROCEDURE — 37000008 ZZH ANESTHESIA TECHNICAL FEE, 1ST 30 MIN: Performed by: SURGERY

## 2019-09-06 PROCEDURE — 43239 EGD BIOPSY SINGLE/MULTIPLE: CPT | Performed by: ANESTHESIOLOGY

## 2019-09-06 PROCEDURE — 40000306 ZZH STATISTIC PRE PROC ASSESS II: Performed by: SURGERY

## 2019-09-06 PROCEDURE — 25000132 ZZH RX MED GY IP 250 OP 250 PS 637: Mod: GY | Performed by: SURGERY

## 2019-09-06 PROCEDURE — 25000128 H RX IP 250 OP 636: Performed by: NURSE ANESTHETIST, CERTIFIED REGISTERED

## 2019-09-06 PROCEDURE — 71000014 ZZH RECOVERY PHASE 1 LEVEL 2 FIRST HR: Performed by: SURGERY

## 2019-09-06 PROCEDURE — 25000131 ZZH RX MED GY IP 250 OP 636 PS 637: Mod: GY | Performed by: INTERNAL MEDICINE

## 2019-09-06 PROCEDURE — 43239 EGD BIOPSY SINGLE/MULTIPLE: CPT | Performed by: SURGERY

## 2019-09-06 PROCEDURE — 43239 EGD BIOPSY SINGLE/MULTIPLE: CPT | Performed by: NURSE ANESTHETIST, CERTIFIED REGISTERED

## 2019-09-06 PROCEDURE — 0DB78ZX EXCISION OF STOMACH, PYLORUS, VIA NATURAL OR ARTIFICIAL OPENING ENDOSCOPIC, DIAGNOSTIC: ICD-10-PCS | Performed by: SURGERY

## 2019-09-06 PROCEDURE — 99140 ANES COMP EMERGENCY COND: CPT | Performed by: NURSE ANESTHETIST, CERTIFIED REGISTERED

## 2019-09-06 PROCEDURE — 36415 COLL VENOUS BLD VENIPUNCTURE: CPT | Performed by: INTERNAL MEDICINE

## 2019-09-06 PROCEDURE — 25000125 ZZHC RX 250: Performed by: NURSE ANESTHETIST, CERTIFIED REGISTERED

## 2019-09-06 RX ORDER — HYDRALAZINE HYDROCHLORIDE 20 MG/ML
2.5-5 INJECTION INTRAMUSCULAR; INTRAVENOUS EVERY 10 MIN PRN
Status: DISCONTINUED | OUTPATIENT
Start: 2019-09-06 | End: 2019-09-06 | Stop reason: HOSPADM

## 2019-09-06 RX ORDER — LIDOCAINE HYDROCHLORIDE 20 MG/ML
INJECTION, SOLUTION INFILTRATION; PERINEURAL PRN
Status: DISCONTINUED | OUTPATIENT
Start: 2019-09-06 | End: 2019-09-06

## 2019-09-06 RX ORDER — LABETALOL 20 MG/4 ML (5 MG/ML) INTRAVENOUS SYRINGE
10
Status: DISCONTINUED | OUTPATIENT
Start: 2019-09-06 | End: 2019-09-06 | Stop reason: HOSPADM

## 2019-09-06 RX ORDER — ONDANSETRON 2 MG/ML
4 INJECTION INTRAMUSCULAR; INTRAVENOUS EVERY 30 MIN PRN
Status: DISCONTINUED | OUTPATIENT
Start: 2019-09-06 | End: 2019-09-06 | Stop reason: HOSPADM

## 2019-09-06 RX ORDER — SUCRALFATE 1 G/1
1 TABLET ORAL
Status: CANCELLED | OUTPATIENT
Start: 2019-09-06

## 2019-09-06 RX ORDER — ACETAMINOPHEN 325 MG/1
650-975 TABLET ORAL EVERY 6 HOURS PRN
Status: DISCONTINUED | OUTPATIENT
Start: 2019-09-06 | End: 2019-09-06 | Stop reason: HOSPADM

## 2019-09-06 RX ORDER — ONDANSETRON 4 MG/1
4 TABLET, ORALLY DISINTEGRATING ORAL EVERY 30 MIN PRN
Status: DISCONTINUED | OUTPATIENT
Start: 2019-09-06 | End: 2019-09-06 | Stop reason: HOSPADM

## 2019-09-06 RX ORDER — NALOXONE HYDROCHLORIDE 0.4 MG/ML
.1-.4 INJECTION, SOLUTION INTRAMUSCULAR; INTRAVENOUS; SUBCUTANEOUS
Status: DISCONTINUED | OUTPATIENT
Start: 2019-09-06 | End: 2019-09-06 | Stop reason: HOSPADM

## 2019-09-06 RX ORDER — PROPOFOL 10 MG/ML
INJECTION, EMULSION INTRAVENOUS PRN
Status: DISCONTINUED | OUTPATIENT
Start: 2019-09-06 | End: 2019-09-06

## 2019-09-06 RX ORDER — SODIUM CHLORIDE, SODIUM LACTATE, POTASSIUM CHLORIDE, CALCIUM CHLORIDE 600; 310; 30; 20 MG/100ML; MG/100ML; MG/100ML; MG/100ML
INJECTION, SOLUTION INTRAVENOUS CONTINUOUS
Status: DISCONTINUED | OUTPATIENT
Start: 2019-09-06 | End: 2019-09-06 | Stop reason: HOSPADM

## 2019-09-06 RX ORDER — SUCRALFATE 1 G/1
1 TABLET ORAL
Qty: 120 TABLET | Refills: 0 | Status: SHIPPED | OUTPATIENT
Start: 2019-09-06 | End: 2019-10-06

## 2019-09-06 RX ORDER — DEXAMETHASONE SODIUM PHOSPHATE 4 MG/ML
4 INJECTION, SOLUTION INTRA-ARTICULAR; INTRALESIONAL; INTRAMUSCULAR; INTRAVENOUS; SOFT TISSUE
Status: DISCONTINUED | OUTPATIENT
Start: 2019-09-06 | End: 2019-09-06 | Stop reason: HOSPADM

## 2019-09-06 RX ORDER — FENTANYL CITRATE 50 UG/ML
25-50 INJECTION, SOLUTION INTRAMUSCULAR; INTRAVENOUS
Status: DISCONTINUED | OUTPATIENT
Start: 2019-09-06 | End: 2019-09-06 | Stop reason: HOSPADM

## 2019-09-06 RX ORDER — ALBUTEROL SULFATE 0.83 MG/ML
2.5 SOLUTION RESPIRATORY (INHALATION) EVERY 4 HOURS PRN
Status: DISCONTINUED | OUTPATIENT
Start: 2019-09-06 | End: 2019-09-06 | Stop reason: HOSPADM

## 2019-09-06 RX ORDER — KETAMINE HYDROCHLORIDE 10 MG/ML
INJECTION INTRAMUSCULAR; INTRAVENOUS PRN
Status: DISCONTINUED | OUTPATIENT
Start: 2019-09-06 | End: 2019-09-06

## 2019-09-06 RX ADMIN — SODIUM CHLORIDE, POTASSIUM CHLORIDE, SODIUM LACTATE AND CALCIUM CHLORIDE: 600; 310; 30; 20 INJECTION, SOLUTION INTRAVENOUS at 05:30

## 2019-09-06 RX ADMIN — INSULIN ASPART 1 UNITS: 100 INJECTION, SOLUTION INTRAVENOUS; SUBCUTANEOUS at 17:08

## 2019-09-06 RX ADMIN — METOPROLOL TARTRATE 25 MG: 25 TABLET ORAL at 09:33

## 2019-09-06 RX ADMIN — SUCRALFATE 1 G: 1 TABLET ORAL at 12:36

## 2019-09-06 RX ADMIN — ACETAMINOPHEN 975 MG: 325 TABLET, FILM COATED ORAL at 15:56

## 2019-09-06 RX ADMIN — KETAMINE HYDROCHLORIDE 100 MG: 10 INJECTION, SOLUTION INTRAMUSCULAR; INTRAVENOUS at 11:47

## 2019-09-06 RX ADMIN — ASPIRIN AND EXTENDED-RELEASE DIPYRIDAMOLE 1 CAPSULE: 25; 200 CAPSULE ORAL at 09:33

## 2019-09-06 RX ADMIN — PROPOFOL 20 MG: 10 INJECTION, EMULSION INTRAVENOUS at 11:44

## 2019-09-06 RX ADMIN — SUCRALFATE 1 G: 1 TABLET ORAL at 07:48

## 2019-09-06 RX ADMIN — SODIUM CHLORIDE, POTASSIUM CHLORIDE, SODIUM LACTATE AND CALCIUM CHLORIDE 1000 ML: 600; 310; 30; 20 INJECTION, SOLUTION INTRAVENOUS at 11:33

## 2019-09-06 RX ADMIN — PROPOFOL 20 MG: 10 INJECTION, EMULSION INTRAVENOUS at 11:45

## 2019-09-06 RX ADMIN — GABAPENTIN 300 MG: 300 CAPSULE ORAL at 07:48

## 2019-09-06 RX ADMIN — POTASSIUM CHLORIDE 20 MEQ: 20 TABLET, EXTENDED RELEASE ORAL at 13:53

## 2019-09-06 RX ADMIN — SUCRALFATE 1 G: 1 TABLET ORAL at 17:05

## 2019-09-06 RX ADMIN — LIDOCAINE HYDROCHLORIDE 40 MG: 20 INJECTION, SOLUTION INFILTRATION; PERINEURAL at 11:41

## 2019-09-06 RX ADMIN — GABAPENTIN 300 MG: 300 CAPSULE ORAL at 12:36

## 2019-09-06 RX ADMIN — PROPOFOL 50 MG: 10 INJECTION, EMULSION INTRAVENOUS at 11:42

## 2019-09-06 RX ADMIN — SODIUM CHLORIDE, POTASSIUM CHLORIDE, SODIUM LACTATE AND CALCIUM CHLORIDE: 600; 310; 30; 20 INJECTION, SOLUTION INTRAVENOUS at 11:26

## 2019-09-06 RX ADMIN — PANTOPRAZOLE SODIUM 40 MG: 40 GRANULE, DELAYED RELEASE ORAL at 07:48

## 2019-09-06 RX ADMIN — PROPOFOL 20 MG: 10 INJECTION, EMULSION INTRAVENOUS at 11:43

## 2019-09-06 RX ADMIN — ONDANSETRON 4 MG: 4 TABLET, ORALLY DISINTEGRATING ORAL at 13:56

## 2019-09-06 ASSESSMENT — ENCOUNTER SYMPTOMS
SHORTNESS OF BREATH: 0
DIARRHEA: 1
VOMITING: 0
NAUSEA: 1
FEVER: 0
COUGH: 0
BLOOD IN STOOL: 0
ABDOMINAL PAIN: 1

## 2019-09-06 ASSESSMENT — ACTIVITIES OF DAILY LIVING (ADL)
ADLS_ACUITY_SCORE: 11

## 2019-09-06 ASSESSMENT — LIFESTYLE VARIABLES: TOBACCO_USE: 1

## 2019-09-06 NOTE — OP NOTE
REPORT OF OPERATION  DATE OF PROCEDURE: 9/6/2019    PATIENT: Cee Pereira    SURGERY PREFORMED: Esophagogastroduodenoscopy with biopsies     PREOPERATIVE DIAGNOSIS: Abdominal Pain    POSTOPERATIVE DIAGNOSIS:    Normal Esophagogastroduodenoscopy   Squamous columnar junction at 40    SURGEON: Froy Barraza MD    ASSISTANTS: None    ANESTHESIA: Monitored Anesthesia Care    COMPLICATIONS: None apparent    TRANSFUSIONS: None    TISSUE TO PATHOLOGY: Antral    FINDINGS: Normal Esophagogastroduodenoscopy and large amount of bile in stomach    INDICATIONS: This is a 73 year old female in need of an Esophagogastroduodenoscopy for Abdominal Pain.  The patient will be taken to the endoscopy suite for that procedure.    DESCRIPTIONS OF PROCEDURE IN DETAIL: After consent was obtained the patient was taken to the operative suite and yulisa in the left lateral decubitus position.  The patient was identified and the correct patient was confirmed. Monitored Anesthesia Care was given by anesthesia. A time out was preformed verifying the correct patient and the correct procedure.  The entire operative team was in agreement.  All necessary equipment and supplies were in the room.    The endoscope was inserted into the mouth and passed without difficulty to the third portion of the duodenum.  Duodenal biopsies were not taken.  The endoscope was then withdrawn through the duodenum, the duodenal bulb and pyloric channel and no abnormalities were noted.  The endoscope was brought back into the stomach and antral biopsies were obtained.  The endoscope was then retroflexed and no lesions of the fundus body or antrum were seen.  The endoscope was straightened back out and brought into the distal esophagus and a well-defined squamocolumnar junction was identified at 40 cm. Biopsies of the distal esophagus were not taken.  The endoscope was slowly withdrawn through the remaining esophagus no other abnormalities are seen,  The endoscope was  withdrawn from the patient the patient was then taken to the recovery room in stable condition tolerated the procedure well.

## 2019-09-06 NOTE — PLAN OF CARE
1554: assessment as charted. Vitals as charted. Pt denies abd pain at this time, but has a headache ( frontal) she rates 8/10. Dr. granda paged and new orders received. Iv site wdl and infusing without difficulty  1700: tylenol give. Dr. granda here to see pt . Pt wishes to go home. Tolerated reg diet at lunch.   1720: discharge instructions given to pt from another floor nurse ( gia)  Iv discontinued. Pt up and independently getting dressed. Pt ordered dinner. carafate given.  Pt states headache better rates 2/10.  Ate dinner ( cream of wheat and pudding) pt aware of picking up prescription at [facility] pharmacy. All quesitons answered regarding discharge. Pt offers no complaints.  1740: pt discharged with .

## 2019-09-06 NOTE — PLAN OF CARE
Pt left unit via cart accompanied by surgery tech for EGD.  Pre-op checklist completed, report called to Saadia in SDS. Consent in chart.

## 2019-09-06 NOTE — DISCHARGE SUMMARY
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  Please see admission H+P for additional details.    Hospital Course   Cee Pereira was admitted on 9/4/2019.  She is a 73-year-old female with a history of diverticulitis status post total colectomy, status post cholecystectomy as well as appendectomy who presents with abdominal pain.  CT scan of the abdomen was suggestive of subcutaneous fluid collection, concerning for an abscess.  She was placed on empiric antibiotics and a surgical consultation was obtained.  After reviewing the films with radiology and surgery, the conclusion was that she did not have an active infectious abscess.  Due to her continued pain however, the patient underwent an EGD on 9/6/2019 which was normal.  Excessive amount of bile was found in the stomach, however no lesions to explain her discomfort were discovered.  She was placed on a trial of Carafate and her diet was advanced.  She was tolerating p.o. diet without difficulty prior to discharge.  She is therefore currently stable to be discharged from the hospital.  She will follow-up with her primary care physician within a week as well as with Dr. Barraza, surgery, next week.    Jaime English MD, MD    Significant Results and Procedures   See below.    Pending Results   These results will be followed up by Matthew Villela    Unresulted Labs Ordered in the Past 30 Days of this Admission     Date and Time Order  Name Status Description    9/6/2019 1144 Surgical pathology exam In process     9/4/2019 1945 Estimated Average Glucose In process           Code Status   Full Code       Primary Care Physician   Matthew Villela    Physical Exam   Temp: 99.4  F (37.4  C) Temp src: Tympanic BP: 147/69   Heart Rate: 83 Resp: 16 SpO2: 95 % O2 Device: None (Room air)    Vitals:    09/05/19 0149   Weight: 76.5 kg (168 lb 10.4 oz)     Vital Signs with Ranges  Temp:  [97.3  F (36.3  C)-99.4  F (37.4  C)] 99.4  F (37.4  C)  Heart Rate:  [72-94] 83  Resp:  [16-18] 16  BP: (103-161)/(55-82) 147/69  SpO2:  [92 %-97 %] 95 %  I/O last 3 completed shifts:  In: 1969 [P.O.:840; I.V.:1129]  Out: -     Constitutional: AA, NAD  Eyes: PERRLA, no injection, no icterus  HEENT: atraumatic, normocephalic  Respiratory: CTA b/l  Cardiovascular: S1 S2 RRR  GI: soft, diffuse tenderness resolved, ND, + bowel sounds  Lymph/Hematologic: no palpable lymphadenopathy  Skin: no rashes, no lesions  Musculoskeletal: No edema, good tone, no deformities  Neurologic: oriented x 3, no focal deficits  Psychiatric: appropriate affect    Discharge Disposition   Discharged to home  Condition at discharge: Stable    Consultations This Hospital Stay   SURGERY GENERAL IP CONSULT    Time Spent on this Encounter   IJaime MD, MD, personally saw the patient today and spent greater than 30 minutes discharging this patient.    Discharge Orders      Reason for your hospital stay    Abdominal pain     Follow-up and recommended labs and tests     Follow up with primary care provider, Matthew Villela, within 7 days for hospital follow- up.  No follow up labs or test are needed.    Follow up with Dr. Barraza as scheduled.     Activity    Your activity upon discharge: activity as tolerated     Full Code     Diet    Follow this diet upon discharge: Orders Placed This Encounter      Regular Diet Adult     Discharge Medications   Current Discharge Medication List      START  taking these medications    Details   sucralfate (CARAFATE) 1 GM tablet Take 1 tablet (1 g) by mouth 4 times daily (before meals and nightly)  Qty: 120 tablet, Refills: 0    Associated Diagnoses: LUQ abdominal pain         CONTINUE these medications which have NOT CHANGED    Details   acetaminophen (TYLENOL) 325 MG tablet Take 650 mg by mouth every 6 hours as needed for mild pain      aspirin-dipyridamole (AGGRENOX)  MG per 12 hr capsule Take 1 capsule by mouth daily      Coenzyme Q10 (COQ-10 PO) Take 100 mg by mouth daily      docusate sodium (COLACE) 100 MG capsule Take 100 mg by mouth daily      !! GABAPENTIN PO Take 300 mg by mouth 2 times daily AM, second dose at 1200      !! GABAPENTIN PO Take 600 mg by mouth At Bedtime      hydroxychloroquine (PLAQUENIL) 200 MG tablet Take 200 mg by mouth 2 times daily      insulin degludec (TRESIBA FLEXTOUCH) 100 UNIT/ML pen Inject 27 Units Subcutaneous At Bedtime       METOPROLOL TARTRATE PO Take 25 mg by mouth daily       PANTOPRAZOLE SODIUM PO Take 40 mg by mouth every morning (before breakfast)      SIMVASTATIN PO Take 10 mg by mouth daily      MOVANTIK 25 MG TABS tablet Take 25 mg by mouth as needed  Refills: 0       !! - Potential duplicate medications found. Please discuss with provider.        Allergies   Allergies   Allergen Reactions     Diatrizoate      Other reaction(s): *Unknown     Iodides      Other reaction(s): *Unknown  Tincture     Ivp Dye [Contrast Dye]      Anaphylaxis     Lortab [Hydrocodone-Acetaminophen] Itching     headaches     No Clinical Screening - See Comments Itching     Pioglitazone Itching     Data   Most Recent 3 CBC's:  Recent Labs   Lab Test 09/06/19 0549 09/05/19 0537 09/04/19 1945   WBC 7.6 9.5 11.7*   HGB 13.4 12.7 14.1   MCV 82 81 82   * 101* 123*      Most Recent 3 BMP's:  Recent Labs   Lab Test 09/06/19 0549 09/05/19 0537 09/04/19 1945    139 138   POTASSIUM 4.0 3.4 3.6   CHLORIDE 105 104 103   CO2 27 25 29    BUN 12 15 16   CR 1.15* 1.13* 1.21*   ANIONGAP 8 10 6   ELVIS 9.3 8.7 9.6   *  145* 163* 112*     Most Recent 2 LFT's:  Recent Labs   Lab Test 09/04/19 1945 05/04/19  0048   AST 16 20   ALT 32 32   ALKPHOS 95 72   BILITOTAL 0.3 0.4     Most Recent INR's and Anticoagulation Dosing History:  Anticoagulation Dose History     Recent Dosing and Labs Latest Ref Rng & Units 8/17/2016    INR 0.80 - 1.20 1.12        Most Recent 3 Troponin's:    Most Recent Cholesterol Panel:No lab results found.  Most Recent 6 Bacteria Isolates From Any Culture (See EPIC Reports for Culture Details):    Most Recent TSH, T4 and A1c Labs:  Recent Labs   Lab Test 09/04/19 1945   A1C 7.7*     Results for orders placed or performed during the hospital encounter of 09/04/19   XR Abdomen 2 Views    Narrative    XR ABDOMEN 2 VW    HISTORY: 73 years Female abd pain, prior bowel obstruction    COMPARISON: 5/6/2019    TECHNIQUE: Abdomen 3 views    FINDINGS: There are surgical staples in the abdomen. Again seen are  changes of bilateral hip arthroplasty. The nasogastric tube present on  the prior study is been removed. There is no evidence of bowel  obstruction or free air.          Impression    IMPRESSION: No evidence of bowel obstruction or free air.    KEYA DRAKE MD   CT Abdomen Pelvis w/o Contrast    Narrative    CT ABDOMEN PELVIS W/O CONTRAST    CLINICAL HISTORY: Female, age 73 years,  Abd pain, acute, generalized;    Comparison:  CT scan abdomen and pelvis 5/4/2019    TECHNIQUE:  CT was performed of the abdomen and pelvis with oral  contrast. Sagittal, coronal and axial reconstructions were reviewed.     FINDINGS:    Abdomen/Pelvis CT:  Lung Bases:  Mild dependent and peripheral atelectasis.    Esophagus/stomach: Normal.    Liver:  Normal.    Gallbladder: Surgically absent    Spleen: Normal.    Pancreas: Normal.    Adrenal Glands: Normal.    Kidneys: Normal.  Ureters: Normal.  Urinary bladder: Not well evaluated secondary to  streak artifact.    Abdominal Aorta: Scattered atherosclerotic calcifications.  IVC: Normal.    Lymph Nodes: Normal.    Large and Small Bowel: Number of loops of small bowel appear to be  here to the anterior wall. Postoperative changes suggest previous  ventral wall hernia repair with mesh. Small volume of fluid is seen  along the mesh repair is present previously. No obstruction. Majority  of the colon is surgically absent.    Appendix: Not seen. Likely surgically absent.    Pelvic Organs:  Obscured by streak artifact.  Peritoneum: Extensive postoperative changes again seen in the left  hemiabdomen.  Bony structures: No acute abnormality. Partial fusion of the SI  joints. Prominent degenerative changes again seen in the lower lumbar  facet joints.    Other Findings:  Inguinal lymph nodes are normal.      Impression    IMPRESSION:   Postoperative changes with a number of adherent small bowel loops  again seen along the anterior aspect of the peritoneum at the site of  prior ventral hernia repair. No evidence of obstruction. Small volume  of fluid is again seen at the hernia repair site is unchanged.     This report is in agreement with the preliminary report.    CHRISTOPHER RAMSAY MD   XR Abdomen 2 Views    Narrative    XR ABDOMEN 2 VW    HISTORY: 73 years Female intraabdominal infection, ? abscess    COMPARISON: None    TECHNIQUE: 3 views abdomen    FINDINGS: Patient status post cholecystectomy. Surgical staples in the  left lower quadrant are present.    No abnormally distended loops of bowel are present. There is no  evidence of free air.      Impression    IMPRESSION: No evidence of bowel obstruction or free air.    KEYA DRAKE MD

## 2019-09-06 NOTE — PROGRESS NOTES
[facility]    Hospitalist Progress Note    Date of Service (when I saw the patient): 09/06/2019    Assessment & Plan   Cee Pereira is a 73 year old female with h/o diverticulitis, HTN, DM-2 who presents with periumbilical pain for a week. PMH of total colectomy for diverticulitis     Principal Problem:    Abdominal pain (H) - uncertain etiology.  Appreciate surgery evaluation.  No abscess, no obstruction.  Abx stopped.  Patient is improving.    - EGD today by Dr. Barraza       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)    Assessment: present on admission. Pt uses insulin.     Plan: ADAT, sliding scale and lower dose of long acting.       HTN (hypertension)    Assessment: treated on presentation    Plan: continue home BP control meds.      Chronic kidney disease - looks to be stable.  Assessment; present on admission  Plan: NS, monitor renal function.      Thrombocytopenia  Assessment: chronic, mild  Plan: only monitoring required. Stop lovenox if < 100     DVT Prophylaxis: Enoxaparin (Lovenox) SQ  Code Status: Full Code     Disposition: Expected discharge in a few  days once improved.    Jaime English MD      Interval History   Patient seen in room.  Improving.  No fevers or chills overnight.  Patient will have EGD by Dr. Barraza today.    -Data reviewed today: I reviewed all new labs and imaging results over the last 24 hours.    Physical Exam   Temp: 98.9  F (37.2  C) Temp src: Tympanic BP: 141/64   Heart Rate: 85 Resp: 18 SpO2: 92 % O2 Device: None (Room air)    Vitals:    09/05/19 0149   Weight: 76.5 kg (168 lb 10.4 oz)     Vital Signs with Ranges  Temp:  [97.8  F (36.6  C)-99.6  F (37.6  C)] 98.9  F (37.2  C)  Heart Rate:  [77-94] 85  Resp:  [17-18] 18  BP: (141-157)/(64-76) 141/64  SpO2:  [92 %-95 %] 92 %    Intake/Output Summary (Last 24 hours) at 9/6/2019 1033  Last data filed at 9/6/2019 0528  Gross per 24 hour   Intake 2014 ml   Output --   Net 2014 ml       Peripheral IV Left  Upper forearm (Active)   Site Assessment WDL 9/6/2019  7:44 AM   Line Status Saline locked 9/6/2019  7:44 AM   Phlebitis Scale 0-->no symptoms 9/6/2019  7:44 AM   Infiltration Scale 0 9/6/2019  7:44 AM   Extravasation? No 9/6/2019  7:44 AM   Number of days:      Line/device assessment(s) completed for medical necessity    Constitutional - AA, NAD  HEENT - atraumatic, normocephalic  Neck - supple, no masses, no JVD  CVS - S1 S2 RRR, no murmurs, rubs, gallops  Respiratory - CTA b/l  GI - soft, diffusely tender to gentle palpation, no guarding, no rebound, + bowel sounds, no organomegaly  Musculoskeletal - no LE edema, no lesions  Neuro - oriented x 3, no gross focal deficits    Medications       aspirin-dipyridamole ER  1 capsule Oral Daily     enoxaparin  40 mg Subcutaneous Daily     gabapentin  300 mg Oral BID     gabapentin  600 mg Oral At Bedtime     insulin aspart  1-3 Units Subcutaneous TID AC     insulin aspart  1-3 Units Subcutaneous At Bedtime     insulin degludec  10 Units Subcutaneous At Bedtime     metoprolol tartrate  25 mg Oral Daily     pantoprazole sodium  40 mg Oral QAM AC     sodium chloride (PF)  3 mL Intracatheter Q8H     sucralfate  1 g Oral 4x Daily AC & HS       Data   Recent Labs   Lab 09/06/19  0549 09/05/19  0537 09/04/19 1945   WBC 7.6 9.5 11.7*   HGB 13.4 12.7 14.1   MCV 82 81 82   * 101* 123*    139 138   POTASSIUM 4.0 3.4 3.6   CHLORIDE 105 104 103   CO2 27 25 29   BUN 12 15 16   CR 1.15* 1.13* 1.21*   ANIONGAP 8 10 6   ELVIS 9.3 8.7 9.6   *  145* 163* 112*   ALBUMIN  --   --  4.2   PROTTOTAL  --   --  8.4   BILITOTAL  --   --  0.3   ALKPHOS  --   --  95   ALT  --   --  32   AST  --   --  16     Lactic Acid   Date Value Ref Range Status   09/04/2019 1.0 0.7 - 2.0 mmol/L Final   05/04/2019 1.3 0.7 - 2.0 mmol/L Final   09/19/2018 0.7 0.4 - 2.0 mmol/L Final       No results found for this or any previous visit (from the past 24 hour(s)).    Jaime English MD, MD

## 2019-09-06 NOTE — PROGRESS NOTES
[place]  Patient visit during  rounds. Patient is being discharged shortly and had no spiritual care requests.

## 2019-09-06 NOTE — PLAN OF CARE
Temp: 97.8  F (36.6  C) Temp src: Temporal BP: 152/76   Heart Rate: 77 Resp: 17 SpO2: 95 % O2 Device: None (Room air)      A&O, c/o abd pain rating 4/10, received PRN dilaudid 1x this shift with a decrease in pain.  BS hypoactive, passing gas and has had a watery/yellow stool per pt's norm.  Tolerating full liquid diet and will be NPO at 0000.   and 162, received ordered coverage. LS clear. LR at 75.  Call lt within reach, makes her needs known.     Face to face report given with opportunity to observe patient.    Report given to Cinda Olmstead RN   9/5/2019  11:41 PM

## 2019-09-06 NOTE — ANESTHESIA PREPROCEDURE EVALUATION
Anesthesia Pre-Procedure Evaluation    Patient: Cee Pereira   MRN: [id number] : 1945          Preoperative Diagnosis: ABDOMINAL PAIN    Procedure(s):  UPPER ENDOSCOPY    Past Medical History:   Diagnosis Date     Diabetes mellitus (H)      Diverticulitis      Herniated lumbar intervertebral disc      Past Surgical History:   Procedure Laterality Date     APPENDECTOMY OPEN       CHOLECYSTECTOMY       COLECTOMY WITHOUT COLOSTOMY      for diverticulitis     DECOMPRESSION LUMBAR ONE LEVEL  2013    L4-L5     JOINT REPLACEMENT      bilateral hip replacements       Anesthesia Evaluation     . Pt has had prior anesthetic.     No history of anesthetic complications          ROS/MED HX    ENT/Pulmonary:     (+)tobacco use, Past use , . Other pulmonary disease h/o PE .    Neurologic:     (+)TIA     Cardiovascular:     (+) Dyslipidemia, hypertension----. Taking blood thinners : Instructions Given to patient: AGGRENOX. . . :. . Previous cardiac testing date:results:Stress Testdate:2018 results:The calculated ejection fraction was  75%.  IMPRESSION:   1. No evidence of myocardial ischemia.  2. Normal left ventricular functionECG reviewed date:2018 results:NSR @ 100, Prolonged QT date: results:          METS/Exercise Tolerance:     Hematologic:     (+) History of blood clots pt is anticoagulated, Other Hematologic Disorder-Thrombocytopenia       Musculoskeletal:   (+) arthritis,  other musculoskeletal- Chronic Back Pain      GI/Hepatic:     (+) GERD Other GI/Hepatic Diverticular Disease s/p partial colectomy       Renal/Genitourinary:     (+) chronic renal disease (Stage 3 (moderate)), type: CRI,       Endo:     (+) type II DM Using insulin Normal glucose range: FS: 139 .      Psychiatric:  - neg psychiatric ROS       Infectious Disease:  - neg infectious disease ROS       Malignancy:      - no malignancy   Other:    - neg other ROS                      Physical Exam  Normal systems: cardiovascular,  "pulmonary and dental    Airway   Mallampati: II  TM distance: >3 FB  Neck ROM: full    Dental     Cardiovascular   Rhythm and rate: regular and normal      Pulmonary    breath sounds clear to auscultation            Lab Results   Component Value Date    WBC 7.6 09/06/2019    HGB 13.4 09/06/2019    HCT 40.4 09/06/2019     (L) 09/06/2019    CRP <2.9 09/17/2018    SED 11 12/29/2016     09/06/2019    POTASSIUM 4.0 09/06/2019    CHLORIDE 105 09/06/2019    CO2 27 09/06/2019    BUN 12 09/06/2019    CR 1.15 (H) 09/06/2019     (H) 09/06/2019     (H) 09/06/2019    ELVIS 9.3 09/06/2019    MAG 1.6 04/22/2018    ALBUMIN 4.2 09/04/2019    PROTTOTAL 8.4 09/04/2019    ALT 32 09/04/2019    AST 16 09/04/2019    ALKPHOS 95 09/04/2019    BILITOTAL 0.3 09/04/2019    LIPASE 166 09/17/2018    AMYLASE 75 09/17/2018    INR 1.12 08/17/2016       Preop Vitals  BP Readings from Last 3 Encounters:   09/06/19 141/64   05/07/19 161/76   09/19/18 149/79    Pulse Readings from Last 3 Encounters:   05/06/19 88   09/19/18 72   04/22/18 78      Resp Readings from Last 3 Encounters:   09/06/19 18   05/07/19 17   09/19/18 16    SpO2 Readings from Last 3 Encounters:   09/06/19 92%   05/07/19 95%   09/19/18 95%      Temp Readings from Last 1 Encounters:   09/06/19 98.9  F (37.2  C) (Tympanic)    Ht Readings from Last 1 Encounters:   09/05/19 1.676 m (5' 6\")      Wt Readings from Last 1 Encounters:   09/05/19 76.5 kg (168 lb 10.4 oz)    Estimated body mass index is 27.22 kg/m  as calculated from the following:    Height as of this encounter: 1.676 m (5' 6\").    Weight as of this encounter: 76.5 kg (168 lb 10.4 oz).       Anesthesia Plan      History & Physical Review  History and physical reviewed and following examination; no interval change.    ASA Status:  3 emergent.    NPO Status:  > 8 hours    Plan for MAC with Intravenous and Propofol induction. Maintenance will be TIVA.  Reason for MAC:  Chronic cardiopulmonary disease " (G9), Procedure to face, neck, head or breast and Other - see comments  PONV prophylaxis:  Ondansetron (or other 5HT-3) and Dexamethasone or Solumedrol  Surgeon requests deep sedation. Patient is an ASA 3E and has advanced age >70. Will provide MAC.      Postoperative Care  Postoperative pain management:  IV analgesics and Oral pain medications.      Consents  Anesthetic plan, risks, benefits and alternatives discussed with:  Patient..                 Antwan Maradiaga MD

## 2019-09-06 NOTE — PROGRESS NOTES
INPATIENT ROUNDING NOTE  9/6/2019    Patient: Cee Pereira    Physician of Record: Hospitalist Service    Admitting diagnosis: Intra-abdominal abscess (H) [K65.1]    Reason for Admission: Abdominal pain     Length of stay: 1    Current Diet: Clear liquids    CURRENT MEDICATIONS:  Continuous Medications:  Current Facility-Administered Medications   Medication Last Rate     lactated ringers 1,000 mL (09/06/19 1133)     no pre procedure antibiotic needed         Scheduled Medications:  Current Facility-Administered Medications   Medication Dose Route Frequency     [Auto Hold] aspirin-dipyridamole ER  1 capsule Oral Daily     [Auto Hold] enoxaparin  40 mg Subcutaneous Daily     [Auto Hold] gabapentin  300 mg Oral BID     [Auto Hold] gabapentin  600 mg Oral At Bedtime     [Auto Hold] insulin aspart  1-3 Units Subcutaneous TID AC     [Auto Hold] insulin aspart  1-3 Units Subcutaneous At Bedtime     [Auto Hold] insulin degludec  10 Units Subcutaneous At Bedtime     [Auto Hold] metoprolol tartrate  25 mg Oral Daily     [Auto Hold] pantoprazole sodium  40 mg Oral QAM AC     [Auto Hold] sodium chloride (PF)  3 mL Intracatheter Q8H     [Auto Hold] sucralfate  1 g Oral 4x Daily AC & HS       PRN Medications:  Current Facility-Administered Medications   Medication Dose Route Frequency     [Auto Hold] glucose  15-30 g Oral Q15 Min PRN    Or     [Auto Hold] dextrose  25-50 mL Intravenous Q15 Min PRN    Or     [Auto Hold] glucagon  1 mg Subcutaneous Q15 Min PRN     [Auto Hold] HYDROmorphone  0.3 mg Subcutaneous Q3H PRN     [Auto Hold] lidocaine 4%   Topical Q1H PRN     [Auto Hold] lidocaine (buffered or not buffered)  0.1-1 mL Other Q1H PRN     [Auto Hold] magnesium sulfate  4 g Intravenous Q4H PRN     [Auto Hold] naloxone  0.1-0.4 mg Intravenous Q2 Min PRN     [Auto Hold] ondansetron  4 mg Oral Q6H PRN    Or     [Auto Hold] ondansetron  4 mg Intravenous Q6H PRN     [Auto Hold] potassium chloride  20-40 mEq Oral or Feeding Tube  "Q2H PRN     [Auto Hold] potassium chloride with lidocaine  10 mEq Intravenous Q1H PRN     [Auto Hold] potassium chloride  10 mEq Intravenous Q1H PRN     [Auto Hold] potassium chloride  20-40 mEq Oral Q2H PRN     [Auto Hold] sodium chloride (PF)  3 mL Intracatheter q1 min prn       SUBJECTIVE:   Nausea: No. Vomiting: No. Fever: No. Chills: No. Excessive burping: No. Flatus: Yes. BM: Yes. Pain is 4/10. Pain control: good. Tolerating current diet: Yes.  Patient is feeling better.    PHYSICAL EXAM:   Vital signs: /82   Temp 98.3  F (36.8  C) (Temporal)   Resp 18   Ht 1.676 m (5' 6\")   Wt 76.5 kg (168 lb 10.4 oz)   SpO2 94%   BMI 27.22 kg/m     Weight: [unfilled]   BMI: Body mass index is 27.22 kg/m .   General: Normal, healthy, cooperative, in no acute distress   HEENT: PERRLA, EOMI and Sclera clear, anicteric   Neck: supple   Lungs: clear to auscultation   CV: Regular rate and rhythm without murmer   Abdominal: Abdomen soft, non-tender. BS normal. No masses, organomegaly   Extremities: No cyanosis, clubbing or edema noted bilaterally in Upper and Lower Extremities   Neurological: without deficit    INPUT/OUTPUT:      Intake/Output Summary (Last 24 hours) at 9/6/2019 1150  Last data filed at 9/6/2019 0528  Gross per 24 hour   Intake 2014 ml   Output --   Net 2014 ml       I/O last 3 completed shifts:  In: 2758 [P.O.:360; I.V.:2398]  Out: -     LABS:    Last CBC Rrsults:   Recent Labs   Lab Test 09/06/19  0549 09/05/19  0537 09/04/19  1945   WBC 7.6 9.5 11.7*   RBC 4.96 4.74 5.18   HGB 13.4 12.7 14.1   HCT 40.4 38.4 42.7   MCV 82 81 82   MCH 27.0 26.8 27.2   MCHC 33.2 33.1 33.0   RDW 13.5 13.5 13.6   * 101* 123*       Last Comprehensive Metabolic panel:  Recent Labs   Lab Test 09/06/19  0549 09/05/19  0537 09/04/19  1945  05/04/19  0048  09/18/18  0546    139 138   < > 137   < > 141   POTASSIUM 4.0 3.4 3.6   < > 3.7   < > 4.3   CHLORIDE 105 104 103   < > 102   < > 108   CO2 27 25 29   < > 28   < > " 24   ANIONGAP 8 10 6   < > 7   < > 9   *  145* 163* 112*   < > 133*   < > 180*   BUN 12 15 16   < > 24   < > 17   CR 1.15* 1.13* 1.21*   < > 1.15*   < > 1.21*   GFRESTIMATED 47* 48* 44*   < > 47*   < > 44*   ELVIS 9.3 8.7 9.6   < > 9.5   < > 8.6   BILITOTAL  --   --  0.3  --  0.4  --  0.3   ALKPHOS  --   --  95  --  72  --  85   ALT  --   --  32  --  32  --  42   AST  --   --  16  --  20  --  35    < > = values in this interval not displayed.       Recent Labs   Lab Test 09/04/19  1945 05/04/19  0048 09/18/18  0546  04/22/18  0603  08/17/16  1920   MAG  --   --   --   --  1.6  --  1.1*   ALBUMIN 4.2 3.9 3.5   < > 3.8   < > 3.9    < > = values in this interval not displayed.       ASSESSMENT:    73 year old female admitted for Intra-abdominal abscess (H) [K65.1].  Here for Abdominal pain. Hospital day 1.     Improving    PLAN: Went ahead and do an upper endoscopy today.  At the time of upper endoscopy a large amount of bile was found within the stomach.  No other abnormalities.    I would go ahead and advance her diet and see how she does.  I would recommend adding Carafate to her med regime and see if this improves.    I will sign off for now.  There is any concerns please contact me.  She can follow-up with me in 1 to 2 weeks as an outpatient.

## 2019-09-06 NOTE — ANESTHESIA CARE TRANSFER NOTE
Patient: Cee Pereira    Procedure(s):  UPPER ENDOSCOPY WITH BIOPSY    Diagnosis: ABDOMINAL PAIN  Diagnosis Additional Information: No value filed.    Anesthesia Type:   MAC     Note:  Airway :Nasal Cannula  Patient transferred to:Phase II  Handoff Report: Identifed the Patient, Identified the Reponsible Provider, Reviewed the pertinent medical history, Discussed the surgical course, Reviewed Intra-OP anesthesia mangement and issues during anesthesia, Set expectations for post-procedure period and Allowed opportunity for questions and acknowledgement of understanding      Vitals: (Last set prior to Anesthesia Care Transfer)    CRNA VITALS  9/6/2019 1117 - 9/6/2019 1152      9/6/2019             Resp Rate (set):  8                Electronically Signed By: FABIAN Purdy CRNA  September 6, 2019  11:52 AM

## 2019-09-06 NOTE — PLAN OF CARE
Follow up appointment made for pt w/ Dr. Barraza on 9/11/19 at the [facility] at 2:25 pm, see AVS.

## 2019-09-06 NOTE — DISCHARGE INSTRUCTIONS
You have a follow up appointment schedule with Matthew Villela at [facility] check-in at 10:20am on Friday 9/13/19.

## 2019-09-06 NOTE — PLAN OF CARE
"Temp: 99.3  F (37.4  C) Temp src: Temporal BP: 157/67   Heart Rate: 94 Resp: 18 SpO2: 94 % O2 Device: None (Room air)       VSS, afebrile, A&O x 4. Patient slept for majority of shift. Reports pain in upper abdominal region but wants to \"wait to take a medicine so I can report to the doctor how it feels.\" Maintained NPO status since 2100 last night when patient had a carton of milk. LR @ 75, IV unremakable. Up independently in room. Call light in reach, ID band on, ed low and locked.     Face to face report given with opportunity to observe patient.    Report given to AYAKA Clark RN   9/6/2019  7:16 AM    "

## 2019-09-06 NOTE — OR NURSING
Pateint discharged to med/surg.  Helen score 10/10. Pain level 0/10.  Discharged from unit via cart.  Hand off report given to afshan ramos  Pt denies abd pain or knee pain currently.

## 2019-09-06 NOTE — PLAN OF CARE
This writer was approached by Dr. Barraza and was asked to make a 1 week follow up for pt once discharged.  Called the clinic and left voicemail for Elida in surgery scheduling.

## 2019-09-06 NOTE — PLAN OF CARE
Pt is A&O, independent, regular diet.  VS /64 HR 85 RR 18 T 98.9 O2 92% on RA, denies pain.  Pt lungs are CTA, bowels active x4, passing flatus.  Pt has been having loose stools since EGD this morning. BG did not require insulin coverage for sugars. Potassium replacement this shift, lab placed for morning. Pt did c/o some abdominal fullness after having regular diet, gave oral Zofran w/ relief noted.  IV is SL.  Brakes locked, call light within reach, makes needs known.  Frequent rounding done to assess needs, free from falls.       Face to face report given with opportunity to observe patient.    Report given to Avril RN    Amber Vogel RN   9/6/2019  2:53 PM

## 2019-09-06 NOTE — PLAN OF CARE
@ 1245, this user administered 0.3 mg sub-Q Dilaudid. However, did not complete the scan of vial, and placed the vial with sub-q needle in the sharps container.  of patient and patient were in room, and pharmacy was called and notified.

## 2019-09-06 NOTE — ANESTHESIA POSTPROCEDURE EVALUATION
Patient: Cee Pereira    Procedure(s):  UPPER ENDOSCOPY WITH BIOPSY    Diagnosis:ABDOMINAL PAIN  Diagnosis Additional Information: No value filed.    Anesthesia Type:  MAC    Note:  Anesthesia Post Evaluation    Patient location during evaluation: Phase 2  Patient participation: Able to fully participate in evaluation  Level of consciousness: awake and alert  Pain management: adequate  Airway patency: patent  Cardiovascular status: acceptable  Respiratory status: acceptable  Hydration status: acceptable  PONV: none             Last vitals:  Vitals:    09/06/19 1355 09/06/19 1530 09/06/19 1554   BP:   147/69   Resp:  16 16   Temp: 97.7  F (36.5  C) 98.2  F (36.8  C) 99.4  F (37.4  C)   SpO2:  97% 95%         Electronically Signed By: FABIAN Dai CRNA  September 6, 2019  6:42 PM

## 2019-09-07 NOTE — PLAN OF CARE
1554: assessment as charted. Vitals as charted. Pt denies abd pain at this time, but has a headache ( frontal) she rates 8/10. Dr. granda paged and new orders received. Iv site wdl and infusing without difficulty  1700: tylenol give. Dr. granda here to see pt . Pt wishes to go home. Tolerated reg diet at lunch.   1720: discharge instructions given to pt from another floor nurse ( gia)  Iv discontinued. Pt up and independently getting dressed. Pt ordered dinner. carafate given.  Pt states headache better rates 2/10.  Ate dinner ( cream of wheat and pudding) pt aware of picking up prescription at [facility] pharmacy. All quesitons answered regarding discharge. Pt offers no complaints.  1740: pt discharged with .

## 2019-09-07 NOTE — PROGRESS NOTES
Name: Cee Pereira    MRN#: [id number]    Reason for Hospitalization: Intra-abdominal abscess (H) [K65.1]    Discharge Date: 9/6/2019    Patient / Family response to discharge plan: in agreement    Follow-Up Appt:   Future Appointments   Date Time Provider Department Center   9/11/2019  2:40 PM Froy Barraza MD [facility]       Other Providers (Care Coordinator, County Services, PCA services etc): No    Discharge Disposition: home    Serina Salinas CM

## 2019-09-07 NOTE — PLAN OF CARE
Patient discharged at 1740 PM via ambulation accompanied by spouse and staff. Prescriptions sent to patients preferred pharmacy. All belongings sent with patient.     Discharge instructions reviewed with amanda. Listed belongings gathered and returned to patient. yes    Patient discharged to home.   Report called to Nursing Home:  na    Core Measures and Vaccines  Core Measures applicable during stay: No. If yes, state diagnosis: na  Pneumonia and Influenza given prior to discharge, if indicated: N/A    Surgical Patient   Surgical Procedures during stay: no  Did patient receive discharge instruction on wound care and recognition of infection symptoms? N/A    MISC  Follow up appointment made:  Yes  Home and hospital aquired medications returned to patient: Yes  Patient reports pain was well managed at discharge: Yes

## 2019-09-09 LAB — COPATH REPORT: NORMAL

## 2019-09-09 NOTE — ADDENDUM NOTE
Addendum  created 09/09/19 0821 by Antwan Maradiaga MD    Attestation recorded in Intraprocedure, Intraprocedure Attestations filed, Intraprocedure Staff edited

## 2019-09-11 ENCOUNTER — OFFICE VISIT (OUTPATIENT)
Dept: SURGERY | Facility: OTHER | Age: 74
End: 2019-09-11
Attending: SURGERY
Payer: MEDICARE

## 2019-09-11 VITALS
DIASTOLIC BLOOD PRESSURE: 66 MMHG | OXYGEN SATURATION: 96 % | SYSTOLIC BLOOD PRESSURE: 118 MMHG | BODY MASS INDEX: 27.22 KG/M2 | HEART RATE: 73 BPM | TEMPERATURE: 97.5 F | HEIGHT: 66 IN

## 2019-09-11 DIAGNOSIS — R10.13 EPIGASTRIC PAIN: Primary | ICD-10-CM

## 2019-09-11 PROCEDURE — 99213 OFFICE O/P EST LOW 20 MIN: CPT | Performed by: SURGERY

## 2019-09-11 PROCEDURE — G0463 HOSPITAL OUTPT CLINIC VISIT: HCPCS

## 2019-09-11 ASSESSMENT — PAIN SCALES - GENERAL: PAINLEVEL: NO PAIN (0)

## 2019-09-11 NOTE — PROGRESS NOTES
CLINIC NOTE - FOLLOW UP  9/11/2019    Patient:Cee Pereira    Reason for Visit: Follow up from recent hospitalization for abdominal pain.  Now in the hospital she did have an upper endoscopy.  At the time of upper endoscopy bile was found within the stomach.  No other mass abnormalities were noted.  She was started on Carafate.  She is feeling better.       Current Medications:  Current Outpatient Medications   Medication Sig Dispense Refill     acetaminophen (TYLENOL) 325 MG tablet Take 650 mg by mouth every 6 hours as needed for mild pain       aspirin-dipyridamole (AGGRENOX)  MG per 12 hr capsule Take 1 capsule by mouth daily       Coenzyme Q10 (COQ-10 PO) Take 100 mg by mouth daily       docusate sodium (COLACE) 100 MG capsule Take 100 mg by mouth daily       GABAPENTIN PO Take 300 mg by mouth 2 times daily AM, second dose at 1200       GABAPENTIN PO Take 600 mg by mouth At Bedtime       hydroxychloroquine (PLAQUENIL) 200 MG tablet Take 200 mg by mouth 2 times daily       insulin degludec (TRESIBA FLEXTOUCH) 100 UNIT/ML pen Inject 27 Units Subcutaneous At Bedtime        METOPROLOL TARTRATE PO Take 25 mg by mouth daily        MOVANTIK 25 MG TABS tablet Take 25 mg by mouth as needed  0     PANTOPRAZOLE SODIUM PO Take 40 mg by mouth every morning (before breakfast)       SIMVASTATIN PO Take 10 mg by mouth daily       sucralfate (CARAFATE) 1 GM tablet Take 1 tablet (1 g) by mouth 4 times daily (before meals and nightly) 120 tablet 0       Allergies:  Allergies   Allergen Reactions     Diatrizoate      Other reaction(s): *Unknown     Iodides      Other reaction(s): *Unknown  Tincture     Ivp Dye [Contrast Dye]      Anaphylaxis     Lortab [Hydrocodone-Acetaminophen] Itching     headaches     No Clinical Screening - See Comments Itching     Pioglitazone Itching       PHYSICAL EXAM:     Vital signs: /66 (BP Location: Right arm, Patient Position: Chair, Cuff Size: Adult Large)   Pulse 73   Temp 97.5  F  "(36.4  C) (Tympanic)   Ht 1.676 m (5' 6\")   SpO2 96%   BMI 27.22 kg/m     Weight: [unfilled]   BMI: Body mass index is 27.22 kg/m .   General: Normal, healthy, cooperative   Skin: no jaundice   HEENT: PERRLA and EOMI   Neck: supple   Lungs: clear to auscultation   CV: Regular rate and rhythm without murmer   Abdominal: abdomen is soft without significant tenderness, masses, organomegaly or guarding   Extremities: No cyanosis, clubbing or edema noted bilaterally in Upper and Lower Extremities   Neurological: without deficit    ASSESSMENT:  73 year old female here as follow up from a recent hospitalization for abdominal pain.    PLAN: We will continue with the Carafate.  We will follow-up with me as needed.  "

## 2019-09-11 NOTE — PATIENT INSTRUCTIONS
Thank you for allowing Dr. Barraza and our surgical team to participate in your care.  If you have a scheduling or an appointment question please contact Mis, our Health Unit Coordinator at her direct line [phone].   ALL nursing questions or concerns can be directed to your surgical nurse at: [phone]

## 2019-09-11 NOTE — NURSING NOTE
"Chief Complaint   Patient presents with     Surgical Followup     EGD/ER follow up        Initial /66 (BP Location: Right arm, Patient Position: Chair, Cuff Size: Adult Large)   Pulse 73   Temp 97.5  F (36.4  C) (Tympanic)   Ht 1.676 m (5' 6\")   SpO2 96%   BMI 27.22 kg/m   Estimated body mass index is 27.22 kg/m  as calculated from the following:    Height as of this encounter: 1.676 m (5' 6\").    Weight as of 9/5/19: 76.5 kg (168 lb 10.4 oz).  Medication Reconciliation: complete       Devika Han LPN    "

## 2019-09-14 ENCOUNTER — TELEPHONE (OUTPATIENT)
Dept: CASE MANAGEMENT | Facility: HOSPITAL | Age: 74
End: 2019-09-14

## 2019-09-21 ENCOUNTER — TELEPHONE (OUTPATIENT)
Dept: CASE MANAGEMENT | Facility: HOSPITAL | Age: 74
End: 2019-09-21

## 2020-06-12 NOTE — PROGRESS NOTES
Pt received Covid 19 prescreen call and instructions. She answered no to all screening questions.

## 2020-06-15 ENCOUNTER — HOSPITAL ENCOUNTER (OUTPATIENT)
Dept: MRI IMAGING | Facility: HOSPITAL | Age: 75
Discharge: HOME | End: 2020-06-15
Attending: FAMILY MEDICINE | Admitting: FAMILY MEDICINE
Payer: MEDICARE

## 2020-06-15 DIAGNOSIS — M54.50 LOW BACK PAIN: ICD-10-CM

## 2020-06-15 PROCEDURE — 72148 MRI LUMBAR SPINE W/O DYE: CPT | Mod: TC

## 2020-09-12 ENCOUNTER — HOSPITAL ENCOUNTER (EMERGENCY)
Facility: HOSPITAL | Age: 75
Discharge: HOME | End: 2020-09-12
Attending: NURSE PRACTITIONER | Admitting: NURSE PRACTITIONER
Payer: MEDICARE

## 2020-09-12 VITALS
RESPIRATION RATE: 16 BRPM | HEART RATE: 79 BPM | TEMPERATURE: 98.6 F | OXYGEN SATURATION: 98 % | SYSTOLIC BLOOD PRESSURE: 132 MMHG | DIASTOLIC BLOOD PRESSURE: 66 MMHG

## 2020-09-12 DIAGNOSIS — R30.0 BURNING WITH URINATION: ICD-10-CM

## 2020-09-12 LAB
ALBUMIN UR-MCNC: NEGATIVE MG/DL
APPEARANCE UR: CLEAR
BACTERIA #/AREA URNS HPF: ABNORMAL /HPF
BILIRUB UR QL STRIP: NEGATIVE
COLOR UR AUTO: ABNORMAL
GLUCOSE UR STRIP-MCNC: NEGATIVE MG/DL
HGB UR QL STRIP: NEGATIVE
KETONES UR STRIP-MCNC: NEGATIVE MG/DL
LEUKOCYTE ESTERASE UR QL STRIP: ABNORMAL
MUCOUS THREADS #/AREA URNS LPF: PRESENT /LPF
NITRATE UR QL: NEGATIVE
PH UR STRIP: 5 PH (ref 4.7–8)
RBC #/AREA URNS AUTO: 1 /HPF (ref 0–2)
SOURCE: ABNORMAL
SP GR UR STRIP: 1.02 (ref 1–1.03)
UROBILINOGEN UR STRIP-MCNC: NORMAL MG/DL (ref 0–2)
WBC #/AREA URNS AUTO: 5 /HPF (ref 0–5)

## 2020-09-12 PROCEDURE — 99202 OFFICE O/P NEW SF 15 MIN: CPT | Mod: Z6 | Performed by: NURSE PRACTITIONER

## 2020-09-12 PROCEDURE — 81001 URINALYSIS AUTO W/SCOPE: CPT | Performed by: NURSE PRACTITIONER

## 2020-09-12 PROCEDURE — G0463 HOSPITAL OUTPT CLINIC VISIT: HCPCS

## 2020-09-12 ASSESSMENT — ENCOUNTER SYMPTOMS
DIAPHORESIS: 0
NAUSEA: 0
HEMATURIA: 0
DIFFICULTY URINATING: 0
CHEST TIGHTNESS: 0
ABDOMINAL PAIN: 0
CARDIOVASCULAR NEGATIVE: 1
CONSTIPATION: 0
FREQUENCY: 0
CHILLS: 0
FATIGUE: 0
VOMITING: 0
DIARRHEA: 0
APPETITE CHANGE: 0
PSYCHIATRIC NEGATIVE: 1
ACTIVITY CHANGE: 0
FEVER: 0
FLANK PAIN: 1
DYSURIA: 1
WHEEZING: 0
SHORTNESS OF BREATH: 0
COUGH: 0
EYES NEGATIVE: 1

## 2020-09-12 NOTE — DISCHARGE INSTRUCTIONS
Go back to using previous soap and see if burning improves.     Go to primary care provider or return to UC if symptoms worsen or don't improve.

## 2020-09-12 NOTE — ED PROVIDER NOTES
"  History     Chief Complaint   Patient presents with     Rule out Urinary Tract Infection     HPI  Cee Pereira is a 74 year old female who presents to  today for complaints of burning with urination and right flank pain for the past 5 days.  Denies fever, chills, nausea, vomiting and diarrhea.  Patient has tried left over cipro and took 500 mg daily for the past 4 days.  Drinks 6-8 glasses of H2O and Gatorade a day.  Does not get UTI frequently.  Last time was in 2013. Flank pain \"achey\" right side 4/10 intermittent.  Patient states has a history of back pain so difficult to determine if the pain is related.              Allergies:  Allergies   Allergen Reactions     Diatrizoate      Other reaction(s): *Unknown     Iodides      Other reaction(s): *Unknown  Tincture     Ivp Dye [Contrast Dye]      Anaphylaxis     Lortab [Hydrocodone-Acetaminophen] Itching     headaches     No Clinical Screening - See Comments Itching     Pioglitazone Itching       Problem List:    Patient Active Problem List    Diagnosis Date Noted     Intra-abdominal abscess (H) 09/05/2019     Priority: Medium     LUQ abdominal pain 09/18/2018     Priority: Medium     GERD (gastroesophageal reflux disease) 09/18/2018     Priority: Medium     HTN (hypertension) 09/18/2018     Priority: Medium     Hyperlipidemia 09/18/2018     Priority: Medium     Viral gastroenteritis 05/23/2017     Priority: Medium     Dehydration 05/23/2017     Priority: Medium     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) 05/23/2017     Priority: Medium     Crohn's disease of large intestine without complication (H) 05/23/2017     Priority: Medium     Gastroenteritis 05/23/2017     Priority: Medium     Small bowel obstruction (H) 08/17/2016     Priority: Medium     SBO (small bowel obstruction) (H) 08/17/2016     Priority: Medium     Lumbar stenosis 07/30/2013     Priority: Medium     Lumbar disc herniation with radiculopathy 08/03/2012     Priority: Medium "        Past Medical History:    Past Medical History:   Diagnosis Date     Diabetes mellitus (H)      Diverticulitis      Herniated lumbar intervertebral disc        Past Surgical History:    Past Surgical History:   Procedure Laterality Date     APPENDECTOMY OPEN       CHOLECYSTECTOMY       COLECTOMY WITHOUT COLOSTOMY      for diverticulitis     DECOMPRESSION LUMBAR ONE LEVEL  7/31/2013    L4-L5     ESOPHAGOSCOPY, GASTROSCOPY, DUODENOSCOPY (EGD), COMBINED N/A 9/6/2019    Procedure: UPPER ENDOSCOPY WITH BIOPSY;  Surgeon: Froy Barraza MD;  Location: [facility]     JOINT REPLACEMENT      bilateral hip replacements       Family History:    No family history on file.    Social History:  Marital Status:   [2]  Social History     Tobacco Use     Smoking status: Former Smoker     Smokeless tobacco: Never Used   Substance Use Topics     Alcohol use: No     Drug use: None        Medications:    acetaminophen (TYLENOL) 325 MG tablet  aspirin-dipyridamole (AGGRENOX)  MG per 12 hr capsule  Coenzyme Q10 (COQ-10 PO)  docusate sodium (COLACE) 100 MG capsule  GABAPENTIN PO  GABAPENTIN PO  hydroxychloroquine (PLAQUENIL) 200 MG tablet  insulin degludec (TRESIBA FLEXTOUCH) 100 UNIT/ML pen  METOPROLOL TARTRATE PO  MOVANTIK 25 MG TABS tablet  PANTOPRAZOLE SODIUM PO  SIMVASTATIN PO          Review of Systems   Constitutional: Negative for activity change, appetite change, chills, diaphoresis, fatigue and fever.   HENT: Negative.    Eyes: Negative.    Respiratory: Negative for cough, chest tightness, shortness of breath and wheezing.    Cardiovascular: Negative.    Gastrointestinal: Negative for abdominal pain, constipation, diarrhea, nausea and vomiting.   Genitourinary: Positive for dysuria, flank pain and urgency. Negative for decreased urine volume, difficulty urinating, enuresis, frequency, hematuria and pelvic pain.   Skin: Negative.    Psychiatric/Behavioral: Negative.        Physical Exam   BP: 132/66  Pulse:  79  Temp: 98.6  F (37  C)  Resp: 16  SpO2: 98 %      Physical Exam  Vitals signs and nursing note reviewed.   Cardiovascular:      Rate and Rhythm: Normal rate and regular rhythm.      Pulses: Normal pulses.      Heart sounds: Normal heart sounds.   Pulmonary:      Effort: Pulmonary effort is normal.      Breath sounds: Normal breath sounds.   Abdominal:      General: Abdomen is flat. Bowel sounds are normal.      Palpations: Abdomen is soft.      Tenderness: There is no abdominal tenderness. There is no right CVA tenderness or left CVA tenderness.   Skin:     General: Skin is warm and dry.      Capillary Refill: Capillary refill takes less than 2 seconds.   Psychiatric:         Mood and Affect: Mood normal.         ED Course     Results for orders placed or performed during the hospital encounter of 09/12/20 (from the past 24 hour(s))   UA with Microscopic reflex to Culture    Specimen: Midstream Urine   Result Value Ref Range    Color Urine Light Yellow     Appearance Urine Clear     Glucose Urine Negative NEG^Negative mg/dL    Bilirubin Urine Negative NEG^Negative    Ketones Urine Negative NEG^Negative mg/dL    Specific Gravity Urine 1.017 1.003 - 1.035    Blood Urine Negative NEG^Negative    pH Urine 5.0 4.7 - 8.0 pH    Protein Albumin Urine Negative NEG^Negative mg/dL    Urobilinogen mg/dL Normal 0.0 - 2.0 mg/dL    Nitrite Urine Negative NEG^Negative    Leukocyte Esterase Urine Trace (A) NEG^Negative    Source Midstream Urine     WBC Urine 5 0 - 5 /HPF    RBC Urine 1 0 - 2 /HPF    Bacteria Urine Few (A) NEG^Negative /HPF    Mucous Urine Present (A) NEG^Negative /LPF       Medications - No data to display    Assessments & Plan (with Medical Decision Making)     I have reviewed the nursing notes.    I have reviewed the findings, diagnosis, plan and need for follow up with the patient.  Dysuria    Hydration (push fluids)    Patient to change back to previous bar soap as she recently changed to a new one.       Follow up with your primary care provider if burning doesn't improve or return to urgent care as needed.    Discharge Medication List as of 9/12/2020 10:19 AM          Final diagnoses:   Burning with urination       9/12/2020   [facility]     Brenda Read, ALFREDO  09/12/20 1149

## 2020-09-12 NOTE — ED AVS SNAPSHOT
[facility]  [address] [address]  Phone:  [phone]                                    Cee Pereira   MRN: [id number]    Department:  [facility]   Date of Visit:  9/12/2020           After Visit Summary Signature Page    I have received my discharge instructions, and my questions have been answered. I have discussed any challenges I see with this plan with the nurse or doctor.    ..........................................................................................................................................  Patient/Patient Representative Signature      ..........................................................................................................................................  Patient Representative Print Name and Relationship to Patient    ..................................................               ................................................  Date                                   Time    ..........................................................................................................................................  Reviewed by Signature/Title    ...................................................              ..............................................  Date                                               Time          22EPIC Rev 08/18

## 2020-09-12 NOTE — ED NOTES
Patient discharged with understanding of instructions and recommendations.  Denies any questions or concerns.     Shireen Prince LPN on 9/12/2020 at 10:30 AM

## 2020-09-12 NOTE — ED TRIAGE NOTES
Patient presents today with concerns of UTI.   Ongoing s/sx for 5 days.   Experiencing dysuria, bilateral flank pain, urinary frequency/urgency,   Denies hematuria, abdominal pain, fevers, chills, pelvic, retention, discharge, incontinence.   Denies hx of UTIs / kidney stone or infections.   4/10  Tylenol OTC PRN   Took some left over Ciprofloxacin 500mg from GI infection in past. Took 4 doses and had no relief.

## 2020-09-12 NOTE — ED TRIAGE NOTES
Pt here with c/o dysuria that began this past Wednesday. States she took a couple of her cipro tablet that she had left over at home with no change in symptoms. Denies and fevers or chills.

## 2020-09-13 ENCOUNTER — DOCUMENTATION ONLY (OUTPATIENT)
Dept: OTHER | Facility: CLINIC | Age: 75
End: 2020-09-13

## 2020-11-02 ENCOUNTER — HOSPITAL ENCOUNTER (EMERGENCY)
Facility: HOSPITAL | Age: 75
Discharge: HOME | End: 2020-11-02
Attending: PHYSICIAN ASSISTANT | Admitting: PHYSICIAN ASSISTANT
Payer: MEDICARE

## 2020-11-02 ENCOUNTER — APPOINTMENT (OUTPATIENT)
Dept: GENERAL RADIOLOGY | Facility: HOSPITAL | Age: 75
End: 2020-11-02
Attending: PHYSICIAN ASSISTANT
Payer: MEDICARE

## 2020-11-02 VITALS
RESPIRATION RATE: 20 BRPM | TEMPERATURE: 97.1 F | SYSTOLIC BLOOD PRESSURE: 138 MMHG | HEART RATE: 70 BPM | DIASTOLIC BLOOD PRESSURE: 83 MMHG | OXYGEN SATURATION: 95 %

## 2020-11-02 DIAGNOSIS — R07.89 SENSATION OF CHEST PRESSURE: Primary | ICD-10-CM

## 2020-11-02 DIAGNOSIS — M25.511 SHOULDER PAIN, BILATERAL: ICD-10-CM

## 2020-11-02 DIAGNOSIS — M25.512 SHOULDER PAIN, BILATERAL: ICD-10-CM

## 2020-11-02 LAB
ALBUMIN SERPL-MCNC: 3.5 G/DL (ref 3.4–5)
ALP SERPL-CCNC: 79 U/L (ref 40–150)
ALT SERPL W P-5'-P-CCNC: 28 U/L (ref 0–50)
ANION GAP SERPL CALCULATED.3IONS-SCNC: 5 MMOL/L (ref 3–14)
AST SERPL W P-5'-P-CCNC: 16 U/L (ref 0–45)
BASOPHILS # BLD AUTO: 0.1 10E9/L (ref 0–0.2)
BASOPHILS NFR BLD AUTO: 0.6 %
BILIRUB SERPL-MCNC: 0.2 MG/DL (ref 0.2–1.3)
BUN SERPL-MCNC: 21 MG/DL (ref 7–30)
CALCIUM SERPL-MCNC: 8.8 MG/DL (ref 8.5–10.1)
CHLORIDE SERPL-SCNC: 105 MMOL/L (ref 94–109)
CO2 SERPL-SCNC: 29 MMOL/L (ref 20–32)
CREAT SERPL-MCNC: 1.28 MG/DL (ref 0.52–1.04)
CRP SERPL-MCNC: <2.9 MG/L (ref 0–8)
DIFFERENTIAL METHOD BLD: ABNORMAL
EOSINOPHIL # BLD AUTO: 0.2 10E9/L (ref 0–0.7)
EOSINOPHIL NFR BLD AUTO: 1.8 %
ERYTHROCYTE [DISTWIDTH] IN BLOOD BY AUTOMATED COUNT: 13.5 % (ref 10–15)
ERYTHROCYTE [SEDIMENTATION RATE] IN BLOOD BY WESTERGREN METHOD: 5 MM/H (ref 0–30)
GFR SERPL CREATININE-BSD FRML MDRD: 41 ML/MIN/{1.73_M2}
GLUCOSE SERPL-MCNC: 138 MG/DL (ref 70–99)
HCT VFR BLD AUTO: 42.3 % (ref 35–47)
HGB BLD-MCNC: 13.2 G/DL (ref 11.7–15.7)
IMM GRANULOCYTES # BLD: 0 10E9/L (ref 0–0.4)
IMM GRANULOCYTES NFR BLD: 0.4 %
LYMPHOCYTES # BLD AUTO: 3.5 10E9/L (ref 0.8–5.3)
LYMPHOCYTES NFR BLD AUTO: 32.7 %
MCH RBC QN AUTO: 26.5 PG (ref 26.5–33)
MCHC RBC AUTO-ENTMCNC: 31.2 G/DL (ref 31.5–36.5)
MCV RBC AUTO: 85 FL (ref 78–100)
MONOCYTES # BLD AUTO: 0.7 10E9/L (ref 0–1.3)
MONOCYTES NFR BLD AUTO: 6.7 %
NEUTROPHILS # BLD AUTO: 6.1 10E9/L (ref 1.6–8.3)
NEUTROPHILS NFR BLD AUTO: 57.8 %
NRBC # BLD AUTO: 0 10*3/UL
NRBC BLD AUTO-RTO: 0 /100
PLATELET # BLD AUTO: 124 10E9/L (ref 150–450)
POTASSIUM SERPL-SCNC: 3.9 MMOL/L (ref 3.4–5.3)
PROT SERPL-MCNC: 7.6 G/DL (ref 6.8–8.8)
RBC # BLD AUTO: 4.98 10E12/L (ref 3.8–5.2)
SODIUM SERPL-SCNC: 139 MMOL/L (ref 133–144)
TROPONIN I SERPL-MCNC: <0.015 UG/L (ref 0–0.04)
TROPONIN I SERPL-MCNC: <0.015 UG/L (ref 0–0.04)
WBC # BLD AUTO: 10.6 10E9/L (ref 4–11)

## 2020-11-02 PROCEDURE — 93010 ELECTROCARDIOGRAM REPORT: CPT | Performed by: INTERNAL MEDICINE

## 2020-11-02 PROCEDURE — 93005 ELECTROCARDIOGRAM TRACING: CPT

## 2020-11-02 PROCEDURE — 84484 ASSAY OF TROPONIN QUANT: CPT | Performed by: PHYSICIAN ASSISTANT

## 2020-11-02 PROCEDURE — 85025 COMPLETE CBC W/AUTO DIFF WBC: CPT | Performed by: PHYSICIAN ASSISTANT

## 2020-11-02 PROCEDURE — 71045 X-RAY EXAM CHEST 1 VIEW: CPT

## 2020-11-02 PROCEDURE — 80053 COMPREHEN METABOLIC PANEL: CPT | Performed by: PHYSICIAN ASSISTANT

## 2020-11-02 PROCEDURE — 36415 COLL VENOUS BLD VENIPUNCTURE: CPT | Performed by: PHYSICIAN ASSISTANT

## 2020-11-02 PROCEDURE — 85652 RBC SED RATE AUTOMATED: CPT | Performed by: PHYSICIAN ASSISTANT

## 2020-11-02 PROCEDURE — 86140 C-REACTIVE PROTEIN: CPT | Performed by: PHYSICIAN ASSISTANT

## 2020-11-02 PROCEDURE — 99285 EMERGENCY DEPT VISIT HI MDM: CPT | Mod: 25

## 2020-11-02 PROCEDURE — 99285 EMERGENCY DEPT VISIT HI MDM: CPT | Performed by: PHYSICIAN ASSISTANT

## 2020-11-02 PROCEDURE — 250N000013 HC RX MED GY IP 250 OP 250 PS 637: Mod: GY | Performed by: PHYSICIAN ASSISTANT

## 2020-11-02 RX ORDER — ASPIRIN 81 MG/1
324 TABLET, CHEWABLE ORAL ONCE
Status: COMPLETED | OUTPATIENT
Start: 2020-11-02 | End: 2020-11-02

## 2020-11-02 RX ORDER — HYDROXYZINE HYDROCHLORIDE 25 MG/1
25 TABLET, FILM COATED ORAL 3 TIMES DAILY PRN
COMMUNITY

## 2020-11-02 RX ADMIN — ASPIRIN 324 MG: 81 TABLET, CHEWABLE ORAL at 20:05

## 2020-11-02 ASSESSMENT — ENCOUNTER SYMPTOMS
WEAKNESS: 1
DIZZINESS: 0
APPETITE CHANGE: 0
ACTIVITY CHANGE: 0
NECK STIFFNESS: 0
VOMITING: 0
LIGHT-HEADEDNESS: 0
BRUISES/BLEEDS EASILY: 1
NECK PAIN: 1
CHEST TIGHTNESS: 0
PALPITATIONS: 0
SHORTNESS OF BREATH: 0
FEVER: 0
TROUBLE SWALLOWING: 0
ABDOMINAL PAIN: 0
FATIGUE: 1
NAUSEA: 0
SORE THROAT: 0

## 2020-11-03 NOTE — ED NOTES
Patient given verbal and written discharge instructions. Patient verbalized understanding of discharge instructions. Rates bilateral shoulder pain 3/10. Denies chest pain at this time. Afebrile. Denies SOB.

## 2020-11-03 NOTE — ED PROVIDER NOTES
"  History     Chief Complaint   Patient presents with     Chest Pain     Generalized Weakness     The history is provided by the patient.     Cee Pereira is a 74 year old female who presented to the emergency department ambulatory along with  for evaluation of 3-day history of intermittent chest discomfort while sitting up, bilateral shoulder pain, bilateral bicep discomfort, and neck discomfort.  Denies any fevers or chills.  Reports feeling slightly weak and fatigued.  Denies any hemoptysis.  Denies any dyspnea.  Denies any abdominal pain.  Denies any vomiting.  Denies any diaphoresis.  Denies any history of coronary artery disease.  Stress testing in 2018 was negative.  The patient does take Aggrenox for what she says is \"they say I had a lot of strokes.\"  Current pain is described as a 1/10.  The pain was not abrupt in onset.    Allergies:  Allergies   Allergen Reactions     Diatrizoate      Other reaction(s): *Unknown     Iodides      Other reaction(s): *Unknown  Tincture     Ivp Dye [Contrast Dye]      Anaphylaxis     Lortab [Hydrocodone-Acetaminophen] Itching     headaches     No Clinical Screening - See Comments Itching     Pioglitazone Itching       Problem List:    Patient Active Problem List    Diagnosis Date Noted     Intra-abdominal abscess (H) 09/05/2019     Priority: Medium     LUQ abdominal pain 09/18/2018     Priority: Medium     GERD (gastroesophageal reflux disease) 09/18/2018     Priority: Medium     HTN (hypertension) 09/18/2018     Priority: Medium     Hyperlipidemia 09/18/2018     Priority: Medium     Viral gastroenteritis 05/23/2017     Priority: Medium     Dehydration 05/23/2017     Priority: Medium     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) 05/23/2017     Priority: Medium     Crohn's disease of large intestine without complication (H) 05/23/2017     Priority: Medium     Gastroenteritis 05/23/2017     Priority: Medium     Small bowel obstruction (H) 08/17/2016    "  Priority: Medium     SBO (small bowel obstruction) (H) 08/17/2016     Priority: Medium     Lumbar stenosis 07/30/2013     Priority: Medium     Lumbar disc herniation with radiculopathy 08/03/2012     Priority: Medium        Past Medical History:    Past Medical History:   Diagnosis Date     Diabetes mellitus (H)      Diverticulitis      Herniated lumbar intervertebral disc        Past Surgical History:    Past Surgical History:   Procedure Laterality Date     APPENDECTOMY OPEN       CHOLECYSTECTOMY       COLECTOMY WITHOUT COLOSTOMY      for diverticulitis     DECOMPRESSION LUMBAR ONE LEVEL  7/31/2013    L4-L5     ESOPHAGOSCOPY, GASTROSCOPY, DUODENOSCOPY (EGD), COMBINED N/A 9/6/2019    Procedure: UPPER ENDOSCOPY WITH BIOPSY;  Surgeon: Froy Barraza MD;  Location: [facility]     JOINT REPLACEMENT      bilateral hip replacements       Family History:    No family history on file.    Social History:  Marital Status:   [2]  Social History     Tobacco Use     Smoking status: Former Smoker     Smokeless tobacco: Never Used   Substance Use Topics     Alcohol use: No     Drug use: Not on file        Medications:         acetaminophen (TYLENOL) 325 MG tablet       aspirin-dipyridamole (AGGRENOX)  MG per 12 hr capsule       Coenzyme Q10 (COQ-10 PO)       docusate sodium (COLACE) 100 MG capsule       GABAPENTIN PO       GABAPENTIN PO       hydroxychloroquine (PLAQUENIL) 200 MG tablet       hydrOXYzine (ATARAX) 25 MG tablet       insulin degludec (TRESIBA FLEXTOUCH) 100 UNIT/ML pen       METOPROLOL TARTRATE PO       PANTOPRAZOLE SODIUM PO       SIMVASTATIN PO          Review of Systems   Constitutional: Positive for fatigue. Negative for activity change, appetite change and fever.   HENT: Negative for sore throat and trouble swallowing.    Respiratory: Negative for chest tightness and shortness of breath.    Cardiovascular: Negative for palpitations and leg swelling.        See HPI.  Patient reports  intermittent anterior pressure and currently a 1 on the 10 scale.   Gastrointestinal: Negative for abdominal pain, nausea and vomiting.   Genitourinary: Negative.    Musculoskeletal: Positive for neck pain. Negative for neck stiffness.        Chronic neck discomfort.   Skin: Negative.    Allergic/Immunologic: Negative for immunocompromised state.   Neurological: Positive for weakness. Negative for dizziness, syncope and light-headedness.        Currently denies any dizziness or lightheadedness.   Hematological: Bruises/bleeds easily.        Patient takes Aggrenox.       Physical Exam   BP: 165/91  Pulse: 77  Temp: 97.1  F (36.2  C)  Resp: 16  SpO2: 98 %      Physical Exam  Vitals signs and nursing note reviewed.   Constitutional:       General: She is not in acute distress.     Appearance: Normal appearance. She is normal weight. She is not ill-appearing, toxic-appearing or diaphoretic.      Comments: Pleasant and talkative 74-year-old female found semireclined on the exam bed in no distress.   Neck:      Musculoskeletal: Normal range of motion and neck supple. No neck rigidity or muscular tenderness.   Cardiovascular:      Rate and Rhythm: Normal rate and regular rhythm.      Pulses: Normal pulses.   Pulmonary:      Effort: Pulmonary effort is normal.      Breath sounds: Normal breath sounds.   Abdominal:      General: There is no distension.      Palpations: Abdomen is soft.      Tenderness: There is no abdominal tenderness. There is no guarding.   Musculoskeletal:         General: No swelling or tenderness.      Comments: Patient complains of bilateral bicep and shoulder discomfort that is sharp in nature and worse with some movement and touch.  Also complains of some mild bilateral neck discomfort that comes and goes as well.  There is no evidence of erythema, swelling, heat, or other concerns.  No rashes.   Skin:     General: Skin is warm and dry.      Capillary Refill: Capillary refill takes less than 2  seconds.   Neurological:      General: No focal deficit present.      Mental Status: She is alert and oriented to person, place, and time.   Psychiatric:         Mood and Affect: Mood normal.         ED Course        Procedures          EKG shows a normal sinus rhythm at a rate of 74.  Normal WI interval.  Normal QRS duration.  Normal QTC.  Normal axis.  Normal P wave duration.  There are no concerning ST segments.  There are no concerning T waves.  There is no evidence of ectopy, preexcitation, or ischemia.  Comparison the previous EKG from 2018 shows no change.  QRS voltage is the same.    Critical Care time:  none               Results for orders placed or performed during the hospital encounter of 11/02/20 (from the past 24 hour(s))   CBC with platelets differential   Result Value Ref Range    WBC 10.6 4.0 - 11.0 10e9/L    RBC Count 4.98 3.8 - 5.2 10e12/L    Hemoglobin 13.2 11.7 - 15.7 g/dL    Hematocrit 42.3 35.0 - 47.0 %    MCV 85 78 - 100 fl    MCH 26.5 26.5 - 33.0 pg    MCHC 31.2 (L) 31.5 - 36.5 g/dL    RDW 13.5 10.0 - 15.0 %    Platelet Count 124 (L) 150 - 450 10e9/L    Diff Method Automated Method     % Neutrophils 57.8 %    % Lymphocytes 32.7 %    % Monocytes 6.7 %    % Eosinophils 1.8 %    % Basophils 0.6 %    % Immature Granulocytes 0.4 %    Nucleated RBCs 0 0 /100    Absolute Neutrophil 6.1 1.6 - 8.3 10e9/L    Absolute Lymphocytes 3.5 0.8 - 5.3 10e9/L    Absolute Monocytes 0.7 0.0 - 1.3 10e9/L    Absolute Eosinophils 0.2 0.0 - 0.7 10e9/L    Absolute Basophils 0.1 0.0 - 0.2 10e9/L    Abs Immature Granulocytes 0.0 0 - 0.4 10e9/L    Absolute Nucleated RBC 0.0    Comprehensive metabolic panel   Result Value Ref Range    Sodium 139 133 - 144 mmol/L    Potassium 3.9 3.4 - 5.3 mmol/L    Chloride 105 94 - 109 mmol/L    Carbon Dioxide 29 20 - 32 mmol/L    Anion Gap 5 3 - 14 mmol/L    Glucose 138 (H) 70 - 99 mg/dL    Urea Nitrogen 21 7 - 30 mg/dL    Creatinine 1.28 (H) 0.52 - 1.04 mg/dL    GFR Estimate 41 (L)  >60 mL/min/[1.73_m2]    GFR Estimate If Black 48 (L) >60 mL/min/[1.73_m2]    Calcium 8.8 8.5 - 10.1 mg/dL    Bilirubin Total 0.2 0.2 - 1.3 mg/dL    Albumin 3.5 3.4 - 5.0 g/dL    Protein Total 7.6 6.8 - 8.8 g/dL    Alkaline Phosphatase 79 40 - 150 U/L    ALT 28 0 - 50 U/L    AST 16 0 - 45 U/L   Troponin I   Result Value Ref Range    Troponin I ES <0.015 0.000 - 0.045 ug/L   CRP inflammation   Result Value Ref Range    CRP Inflammation <2.9 0.0 - 8.0 mg/L   Erythrocyte sedimentation rate auto   Result Value Ref Range    Sed Rate 5 0 - 30 mm/h   XR Chest Port 1 View    Narrative    PROCEDURE:  XR CHEST PORT 1 VW    HISTORY: chest pain. .    COMPARISON:  3/22/17    FINDINGS:    The cardiomediastinal contours are stable. There is calcific aortic  atherosclerosis.   No focal consolidation, effusion or pneumothorax.      Impression    IMPRESSION:  Stable chest.      MEREDITH HUA MD       Medications   aspirin (ASA) chewable tablet 324 mg (324 mg Oral Given 11/2/20 2005)             Assessments & Plan (with Medical Decision Making)   This is a 74-year-old female who presented to the emergency department ambulatory along with her  for evaluation of approximate 3 or 4-day history of intermittent chest discomfort, neck pain, and bilateral shoulder and bicep discomfort.  She currently reports that her chest discomfort at a 1 on the 10 scale but is mostly concerned about her bilateral shoulder pain that she describes as sharp and intermittent in nature.  She denies any abrupt onset or severe chest discomfort.  Denies any ripping or tearing sensation.  She is otherwise pleasant and talkative.  She had a negative stress test in 2018.  EKG is unchanged from 2018.  Troponin is undetectable x2.  I had a long and detailed discussion with the patient and her .  We discussed the work-up for pulmonary embolism as well as thoracic aortic dissection.  They declined that work-up which is certainly reasonable.  The  bilateral shoulder discomfort as well as generalized weakness was most consistent with inflammatory condition such as PMR however the patient has a negative sed rate and CRP.  She will be set up for outpatient stress test.  She was advised to talk to her primary care provider tomorrow for further investigation.      With the patient's symptoms lasting approximately 4 days, unchanged EKG, and undetectable troponin x2, I do believe that she can be discharged home with close follow-up and returning here for any worsening symptoms, new symptoms, or other concerns.  The patient and her  voiced complete understanding of the work-up, possible etiologies, and were quite happy and agreeable with the discharge and plan.    Differential diagnosis included acute coronary syndrome, acute pulmonary embolism, thoracic aortic dissection, pneumothorax, pneumonia, pericarditis, inflammatory arthritis, cervical radiculopathy, and musculoskeletal source.      I have reviewed the nursing notes.    I have reviewed the findings, diagnosis, plan and need for follow up with the patient.       New Prescriptions    No medications on file       Final diagnoses:   Sensation of chest pressure   Shoulder pain, bilateral       11/2/2020   [facility]     Lias Morton PA-C  11/02/20 2303

## 2020-11-03 NOTE — ED TRIAGE NOTES
Pt states she has been having chest pain on and off for the last 3 days that radiates to her neck and both arms. States pain never goes away- maybe down to a 1/10 at times. Aggravated by activity. C/O feeling very tired and SOB with activity. Denies any recent ilness. Pain 4/10 in triage.

## 2020-11-03 NOTE — ED NOTES
"Pain between the shoulder blades for about 3 days.  denies any injury or heavy lifting.  Pt does report she is getting dizzy \"from time to time\" denies any dizziness.  Pt stated it usually happens when she is walking and then feels like she get wobbly   "

## 2020-11-03 NOTE — DISCHARGE INSTRUCTIONS
I do not know what is causing your symptoms tonight.  They have been present for a few days and your ER work-up tonight does not show any signs of acute heart damage.  This does not mean that everything is fine.  I believe that you would fit any reasonable indication for stress testing.  I have placed an order for this and they should call to schedule this week.  Please continue all home medications. Please rest and stay hydrated. Return here for ANY worsening symptoms, new symptoms, return of pain, or other questions or concerns whatsoever. Please call and discuss your symptoms tomorrow with Dr. Villela to see if he would like any other work-up.

## 2020-11-04 ENCOUNTER — HOSPITAL ENCOUNTER (OUTPATIENT)
Dept: NUCLEAR MEDICINE | Facility: HOSPITAL | Age: 75
Setting detail: NUCLEAR MEDICINE
End: 2020-11-04
Attending: PHYSICIAN ASSISTANT
Payer: MEDICARE

## 2020-11-04 ENCOUNTER — HOSPITAL ENCOUNTER (OUTPATIENT)
Dept: CARDIOLOGY | Facility: HOSPITAL | Age: 75
Setting detail: NUCLEAR MEDICINE
End: 2020-11-04
Attending: PHYSICIAN ASSISTANT
Payer: MEDICARE

## 2020-11-04 DIAGNOSIS — R07.89 SENSATION OF CHEST PRESSURE: ICD-10-CM

## 2020-11-04 LAB
CV BLOOD PRESSURE: 82 %
CV STRESS MAX HR HE: 98
NUC STRESS EJECTION FRACTION: 70 %
RATE PRESSURE PRODUCT: NORMAL
STRESS ECHO BASELINE DIASTOLIC HE: 86
STRESS ECHO BASELINE HR: 72
STRESS ECHO BASELINE SYSTOLIC BP: 156
STRESS ECHO CALCULATED PERCENT HR: 67 %
STRESS ECHO LAST STRESS DIASTOLIC BP: 72
STRESS ECHO LAST STRESS SYSTOLIC BP: 130
STRESS ECHO TARGET HR: 147

## 2020-11-04 PROCEDURE — 250N000011 HC RX IP 250 OP 636: Performed by: INTERNAL MEDICINE

## 2020-11-04 PROCEDURE — 78452 HT MUSCLE IMAGE SPECT MULT: CPT

## 2020-11-04 PROCEDURE — 93016 CV STRESS TEST SUPVJ ONLY: CPT | Performed by: INTERNAL MEDICINE

## 2020-11-04 PROCEDURE — 343N000001 HC RX 343: Performed by: RADIOLOGY

## 2020-11-04 PROCEDURE — A9500 TC99M SESTAMIBI: HCPCS | Performed by: RADIOLOGY

## 2020-11-04 PROCEDURE — 93017 CV STRESS TEST TRACING ONLY: CPT | Performed by: INTERNAL MEDICINE

## 2020-11-04 PROCEDURE — 93018 CV STRESS TEST I&R ONLY: CPT | Performed by: INTERNAL MEDICINE

## 2020-11-04 RX ORDER — AMINOPHYLLINE 25 MG/ML
INJECTION, SOLUTION INTRAVENOUS
Status: DISCONTINUED
Start: 2020-11-04 | End: 2020-11-04 | Stop reason: WASHOUT

## 2020-11-04 RX ORDER — REGADENOSON 0.08 MG/ML
0.4 INJECTION, SOLUTION INTRAVENOUS ONCE
Status: COMPLETED | OUTPATIENT
Start: 2020-11-04 | End: 2020-11-04

## 2020-11-04 RX ADMIN — Medication 33 MILLICURIE: at 09:31

## 2020-11-04 RX ADMIN — Medication 10.52 MILLICURIE: at 07:27

## 2020-11-04 RX ADMIN — REGADENOSON 0.4 MG: 0.08 INJECTION, SOLUTION INTRAVENOUS at 09:30

## 2020-11-17 ENCOUNTER — MEDICAL CORRESPONDENCE (OUTPATIENT)
Dept: MRI IMAGING | Facility: HOSPITAL | Age: 75
End: 2020-11-17

## 2020-11-25 ENCOUNTER — HOSPITAL ENCOUNTER (OUTPATIENT)
Dept: MRI IMAGING | Facility: HOSPITAL | Age: 75
Discharge: HOME | End: 2020-11-25
Attending: FAMILY MEDICINE | Admitting: FAMILY MEDICINE
Payer: MEDICARE

## 2020-11-25 DIAGNOSIS — M50.20 OTHER CERVICAL DISC DISPLACEMENT, UNSPECIFIED CERVICAL REGION: ICD-10-CM

## 2020-11-25 DIAGNOSIS — M54.2 CERVICALGIA: ICD-10-CM

## 2020-11-25 PROCEDURE — 72141 MRI NECK SPINE W/O DYE: CPT

## 2020-11-30 ENCOUNTER — MEDICAL CORRESPONDENCE (OUTPATIENT)
Dept: MRI IMAGING | Facility: HOSPITAL | Age: 75
End: 2020-11-30

## 2020-12-07 ENCOUNTER — MEDICAL CORRESPONDENCE (OUTPATIENT)
Dept: INTERVENTIONAL RADIOLOGY/VASCULAR | Facility: HOSPITAL | Age: 75
End: 2020-12-07

## 2020-12-07 ENCOUNTER — HOSPITAL ENCOUNTER (OUTPATIENT)
Facility: HOSPITAL | Age: 75
End: 2020-12-07
Attending: RADIOLOGY | Admitting: RADIOLOGY
Payer: MEDICARE

## 2020-12-08 ENCOUNTER — TELEPHONE (OUTPATIENT)
Dept: INTERVENTIONAL RADIOLOGY/VASCULAR | Facility: HOSPITAL | Age: 75
End: 2020-12-08

## 2020-12-08 NOTE — TELEPHONE ENCOUNTER
Spoke with patient regarding contrast allergy.  Steroid prep called into [facility] pharmacy in [place], pt aware

## 2020-12-09 ENCOUNTER — TELEPHONE (OUTPATIENT)
Dept: INTERVENTIONAL RADIOLOGY/VASCULAR | Facility: HOSPITAL | Age: 75
End: 2020-12-09

## 2020-12-09 NOTE — TELEPHONE ENCOUNTER
Patient informed not to have a flu shot/immunization two weeks before/after injection, and aware to call and reschedule if waiting on a covid test result/experiencing covid symptoms.

## 2020-12-28 ENCOUNTER — DOCUMENTATION ONLY (OUTPATIENT)
Dept: INTERVENTIONAL RADIOLOGY/VASCULAR | Facility: HOSPITAL | Age: 75
End: 2020-12-28

## 2020-12-28 NOTE — PROGRESS NOTES
Left message to hold aggrenox starting jan. 6 and reminded her to take steroid prep 12 hrs and 2 hrs before injection. Left number if any questions

## 2021-03-30 ENCOUNTER — HOSPITAL ENCOUNTER (EMERGENCY)
Facility: HOSPITAL | Age: 76
Discharge: HOME | End: 2021-03-30
Attending: EMERGENCY MEDICINE | Admitting: EMERGENCY MEDICINE
Payer: MEDICARE

## 2021-03-30 ENCOUNTER — APPOINTMENT (OUTPATIENT)
Dept: GENERAL RADIOLOGY | Facility: HOSPITAL | Age: 76
End: 2021-03-30
Attending: EMERGENCY MEDICINE
Payer: MEDICARE

## 2021-03-30 VITALS
DIASTOLIC BLOOD PRESSURE: 73 MMHG | OXYGEN SATURATION: 96 % | HEART RATE: 70 BPM | RESPIRATION RATE: 18 BRPM | SYSTOLIC BLOOD PRESSURE: 148 MMHG | TEMPERATURE: 97.1 F

## 2021-03-30 DIAGNOSIS — E83.42 HYPOMAGNESEMIA: ICD-10-CM

## 2021-03-30 DIAGNOSIS — R00.2 PALPITATIONS: ICD-10-CM

## 2021-03-30 LAB
ANION GAP SERPL CALCULATED.3IONS-SCNC: 7 MMOL/L (ref 3–14)
BASOPHILS # BLD AUTO: 0 10E9/L (ref 0–0.2)
BASOPHILS NFR BLD AUTO: 0.4 %
BUN SERPL-MCNC: 18 MG/DL (ref 7–30)
CALCIUM SERPL-MCNC: 9.2 MG/DL (ref 8.5–10.1)
CHLORIDE SERPL-SCNC: 105 MMOL/L (ref 94–109)
CO2 SERPL-SCNC: 26 MMOL/L (ref 20–32)
CREAT SERPL-MCNC: 1.15 MG/DL (ref 0.52–1.04)
DIFFERENTIAL METHOD BLD: ABNORMAL
EOSINOPHIL # BLD AUTO: 0.2 10E9/L (ref 0–0.7)
EOSINOPHIL NFR BLD AUTO: 3.3 %
ERYTHROCYTE [DISTWIDTH] IN BLOOD BY AUTOMATED COUNT: 13.5 % (ref 10–15)
GFR SERPL CREATININE-BSD FRML MDRD: 47 ML/MIN/{1.73_M2}
GLUCOSE SERPL-MCNC: 159 MG/DL (ref 70–99)
HCT VFR BLD AUTO: 38.5 % (ref 35–47)
HGB BLD-MCNC: 12.6 G/DL (ref 11.7–15.7)
IMM GRANULOCYTES # BLD: 0 10E9/L (ref 0–0.4)
IMM GRANULOCYTES NFR BLD: 0.3 %
INR PPP: 1 (ref 0.86–1.14)
LYMPHOCYTES # BLD AUTO: 2.3 10E9/L (ref 0.8–5.3)
LYMPHOCYTES NFR BLD AUTO: 34.3 %
MAGNESIUM SERPL-MCNC: 1.5 MG/DL (ref 1.6–2.3)
MCH RBC QN AUTO: 25.9 PG (ref 26.5–33)
MCHC RBC AUTO-ENTMCNC: 32.7 G/DL (ref 31.5–36.5)
MCV RBC AUTO: 79 FL (ref 78–100)
MONOCYTES # BLD AUTO: 0.5 10E9/L (ref 0–1.3)
MONOCYTES NFR BLD AUTO: 7.7 %
NEUTROPHILS # BLD AUTO: 3.7 10E9/L (ref 1.6–8.3)
NEUTROPHILS NFR BLD AUTO: 54 %
NRBC # BLD AUTO: 0 10*3/UL
NRBC BLD AUTO-RTO: 0 /100
PLATELET # BLD AUTO: 120 10E9/L (ref 150–450)
POTASSIUM SERPL-SCNC: 3.6 MMOL/L (ref 3.4–5.3)
RBC # BLD AUTO: 4.86 10E12/L (ref 3.8–5.2)
SODIUM SERPL-SCNC: 138 MMOL/L (ref 133–144)
TROPONIN I SERPL-MCNC: <0.015 UG/L (ref 0–0.04)
TSH SERPL DL<=0.005 MIU/L-ACNC: 3.43 MU/L (ref 0.4–4)
WBC # BLD AUTO: 6.8 10E9/L (ref 4–11)

## 2021-03-30 PROCEDURE — 80048 BASIC METABOLIC PNL TOTAL CA: CPT | Performed by: EMERGENCY MEDICINE

## 2021-03-30 PROCEDURE — 85610 PROTHROMBIN TIME: CPT | Mod: GZ | Performed by: EMERGENCY MEDICINE

## 2021-03-30 PROCEDURE — 36415 COLL VENOUS BLD VENIPUNCTURE: CPT

## 2021-03-30 PROCEDURE — 99285 EMERGENCY DEPT VISIT HI MDM: CPT | Mod: 25

## 2021-03-30 PROCEDURE — 93010 ELECTROCARDIOGRAM REPORT: CPT | Performed by: INTERNAL MEDICINE

## 2021-03-30 PROCEDURE — 83735 ASSAY OF MAGNESIUM: CPT | Performed by: EMERGENCY MEDICINE

## 2021-03-30 PROCEDURE — 71045 X-RAY EXAM CHEST 1 VIEW: CPT

## 2021-03-30 PROCEDURE — 93005 ELECTROCARDIOGRAM TRACING: CPT

## 2021-03-30 PROCEDURE — 250N000013 HC RX MED GY IP 250 OP 250 PS 637: Performed by: EMERGENCY MEDICINE

## 2021-03-30 PROCEDURE — 84484 ASSAY OF TROPONIN QUANT: CPT | Performed by: EMERGENCY MEDICINE

## 2021-03-30 PROCEDURE — 84443 ASSAY THYROID STIM HORMONE: CPT | Performed by: EMERGENCY MEDICINE

## 2021-03-30 PROCEDURE — 99284 EMERGENCY DEPT VISIT MOD MDM: CPT | Performed by: EMERGENCY MEDICINE

## 2021-03-30 PROCEDURE — 85025 COMPLETE CBC W/AUTO DIFF WBC: CPT | Performed by: EMERGENCY MEDICINE

## 2021-03-30 RX ORDER — MAGNESIUM OXIDE 400 MG/1
400 TABLET ORAL ONCE
Status: COMPLETED | OUTPATIENT
Start: 2021-03-30 | End: 2021-03-30

## 2021-03-30 RX ORDER — SUCRALFATE 1 G/1
1 TABLET ORAL 4 TIMES DAILY
COMMUNITY

## 2021-03-30 RX ADMIN — MAGNESIUM OXIDE 400 MG (241.3 MG MAGNESIUM) TABLET 400 MG: TABLET at 07:40

## 2021-03-30 ASSESSMENT — ENCOUNTER SYMPTOMS
NECK STIFFNESS: 0
NECK PAIN: 0
CONSTITUTIONAL NEGATIVE: 1
PSYCHIATRIC NEGATIVE: 1
FEVER: 0
MYALGIAS: 0
NEUROLOGICAL NEGATIVE: 1
ALLERGIC/IMMUNOLOGIC NEGATIVE: 1
HEMATOLOGIC/LYMPHATIC NEGATIVE: 1
GASTROINTESTINAL NEGATIVE: 1
EYES NEGATIVE: 1
SHORTNESS OF BREATH: 0
PHOTOPHOBIA: 0
PALPITATIONS: 1
ENDOCRINE NEGATIVE: 1
MUSCULOSKELETAL NEGATIVE: 1

## 2021-03-30 NOTE — DISCHARGE INSTRUCTIONS
1) Follow the aftercare instructions provided  2) Return to the ER if you develop any recurrent symptoms, chest pain or shortness of breath  3) You must follow up with your doctor in 12 to 24 hours.

## 2021-03-30 NOTE — ED NOTES
States that last night she started having palpitations. States she is still having them, but not as bad.  States they were in [place] recently and she had similar symptoms and went to the hospital and she states her magnesium was low and they gave her some IV magnesium and she felt better. States she was discharged and then had to return 3 days later for same symptoms and she received more IV magnesium.  She initially denies chest pain during triage, but then does state she had some when provider is in to see her.

## 2021-03-30 NOTE — ED PROVIDER NOTES
History     Chief Complaint   Patient presents with     Palpitations     HPI  Cee Pereira is a 75 year old female who presents today with complaints of palpitations.  Symptoms began approximately few hours ago.  Not associated with shortness of breath or chest pain.  Patient has otherwise been in usual state of health.  No additional complaints.  Recently seen in ER in [place] for palpitations.  Patient was seen on the 12th and 16th of this month.  Patient states that she was told that her magnesium levels were low.  No additional complaints    Allergies:  Allergies   Allergen Reactions     Diatrizoate      Other reaction(s): *Unknown     Iodides      Other reaction(s): *Unknown  Tincture     Ivp Dye [Contrast Dye]      Anaphylaxis     Lortab [Hydrocodone-Acetaminophen] Itching     headaches     No Clinical Screening - See Comments Itching     Pioglitazone Itching       Problem List:    Patient Active Problem List    Diagnosis Date Noted     Intra-abdominal abscess (H) 09/05/2019     Priority: Medium     LUQ abdominal pain 09/18/2018     Priority: Medium     GERD (gastroesophageal reflux disease) 09/18/2018     Priority: Medium     HTN (hypertension) 09/18/2018     Priority: Medium     Hyperlipidemia 09/18/2018     Priority: Medium     Viral gastroenteritis 05/23/2017     Priority: Medium     Dehydration 05/23/2017     Priority: Medium     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) 05/23/2017     Priority: Medium     Crohn's disease of large intestine without complication (H) 05/23/2017     Priority: Medium     Gastroenteritis 05/23/2017     Priority: Medium     Small bowel obstruction (H) 08/17/2016     Priority: Medium     SBO (small bowel obstruction) (H) 08/17/2016     Priority: Medium     Lumbar stenosis 07/30/2013     Priority: Medium     Lumbar disc herniation with radiculopathy 08/03/2012     Priority: Medium        Past Medical History:    Past Medical History:   Diagnosis Date      Diabetes mellitus (H)      Diverticulitis      Herniated lumbar intervertebral disc        Past Surgical History:    Past Surgical History:   Procedure Laterality Date     APPENDECTOMY OPEN       CHOLECYSTECTOMY       COLECTOMY WITHOUT COLOSTOMY      for diverticulitis     DECOMPRESSION LUMBAR ONE LEVEL  7/31/2013    L4-L5     ESOPHAGOSCOPY, GASTROSCOPY, DUODENOSCOPY (EGD), COMBINED N/A 9/6/2019    Procedure: UPPER ENDOSCOPY WITH BIOPSY;  Surgeon: Froy Barraza MD;  Location: [facility]     JOINT REPLACEMENT      bilateral hip replacements       Family History:    No family history on file.    Social History:  Marital Status:   [2]  Social History     Tobacco Use     Smoking status: Former Smoker     Smokeless tobacco: Never Used   Substance Use Topics     Alcohol use: No     Drug use: Not on file        Medications:    acetaminophen (TYLENOL) 325 MG tablet  aspirin-dipyridamole (AGGRENOX)  MG per 12 hr capsule  Coenzyme Q10 (COQ-10 PO)  docusate sodium (COLACE) 100 MG capsule  GABAPENTIN PO  GABAPENTIN PO  hydroxychloroquine (PLAQUENIL) 200 MG tablet  hydrOXYzine (ATARAX) 25 MG tablet  insulin degludec (TRESIBA FLEXTOUCH) 100 UNIT/ML pen  METOPROLOL TARTRATE PO  PANTOPRAZOLE SODIUM PO  SIMVASTATIN PO  sucralfate (CARAFATE) 1 GM tablet          Review of Systems   Constitutional: Negative.  Negative for fever.   HENT: Negative.    Eyes: Negative.  Negative for photophobia.   Respiratory: Negative for shortness of breath.    Cardiovascular: Positive for palpitations.   Gastrointestinal: Negative.    Endocrine: Negative.    Genitourinary: Negative.    Musculoskeletal: Negative.  Negative for myalgias, neck pain and neck stiffness.   Skin: Negative.    Allergic/Immunologic: Negative.    Neurological: Negative.    Hematological: Negative.    Psychiatric/Behavioral: Negative.        Physical Exam   BP: 131/75  Pulse: 77  Temp: 97.1  F (36.2  C)  Resp: 18  SpO2: 97 %      Physical Exam  Constitutional:        General: She is not in acute distress.     Appearance: Normal appearance. She is normal weight. She is not toxic-appearing.   HENT:      Head: Normocephalic.   Neck:      Musculoskeletal: Normal range of motion.   Cardiovascular:      Rate and Rhythm: Normal rate and regular rhythm.      Pulses: Normal pulses.   Pulmonary:      Effort: Pulmonary effort is normal.      Breath sounds: Normal breath sounds.   Abdominal:      General: Abdomen is flat.   Skin:     General: Skin is warm.   Neurological:      General: No focal deficit present.      Mental Status: She is alert.   Psychiatric:         Mood and Affect: Mood normal.         Behavior: Behavior normal.         Thought Content: Thought content normal.         Judgment: Judgment normal.         ED Course        Procedures          EKG - NSR without ST changes. No ectopy noted.        Results for orders placed or performed during the hospital encounter of 03/30/21 (from the past 24 hour(s))   CBC with platelets differential   Result Value Ref Range    WBC 6.8 4.0 - 11.0 10e9/L    RBC Count 4.86 3.8 - 5.2 10e12/L    Hemoglobin 12.6 11.7 - 15.7 g/dL    Hematocrit 38.5 35.0 - 47.0 %    MCV 79 78 - 100 fl    MCH 25.9 (L) 26.5 - 33.0 pg    MCHC 32.7 31.5 - 36.5 g/dL    RDW 13.5 10.0 - 15.0 %    Platelet Count 120 (L) 150 - 450 10e9/L    Diff Method Automated Method     % Neutrophils 54.0 %    % Lymphocytes 34.3 %    % Monocytes 7.7 %    % Eosinophils 3.3 %    % Basophils 0.4 %    % Immature Granulocytes 0.3 %    Nucleated RBCs 0 0 /100    Absolute Neutrophil 3.7 1.6 - 8.3 10e9/L    Absolute Lymphocytes 2.3 0.8 - 5.3 10e9/L    Absolute Monocytes 0.5 0.0 - 1.3 10e9/L    Absolute Eosinophils 0.2 0.0 - 0.7 10e9/L    Absolute Basophils 0.0 0.0 - 0.2 10e9/L    Abs Immature Granulocytes 0.0 0 - 0.4 10e9/L    Absolute Nucleated RBC 0.0    INR   Result Value Ref Range    INR 1.00 0.86 - 1.14   Basic metabolic panel   Result Value Ref Range    Sodium 138 133 - 144 mmol/L     Potassium 3.6 3.4 - 5.3 mmol/L    Chloride 105 94 - 109 mmol/L    Carbon Dioxide 26 20 - 32 mmol/L    Anion Gap 7 3 - 14 mmol/L    Glucose 159 (H) 70 - 99 mg/dL    Urea Nitrogen 18 7 - 30 mg/dL    Creatinine 1.15 (H) 0.52 - 1.04 mg/dL    GFR Estimate 47 (L) >60 mL/min/[1.73_m2]    GFR Estimate If Black 54 (L) >60 mL/min/[1.73_m2]    Calcium 9.2 8.5 - 10.1 mg/dL   Magnesium   Result Value Ref Range    Magnesium 1.5 (L) 1.6 - 2.3 mg/dL   Troponin I   Result Value Ref Range    Troponin I ES <0.015 0.000 - 0.045 ug/L   TSH   Result Value Ref Range    TSH 3.43 0.40 - 4.00 mU/L   XR Chest Port 1 View    Narrative    PROCEDURE:  XR CHEST PORT 1 VW    HISTORY:  75-year-old female with complaints of palpitations.  R/o  lung mass.     COMPARISON:  November 2, 2020    FINDINGS:   The cardiac silhouette is normal in size. The pulmonary vasculature is  normal.  The lungs are clear. No pleural effusion or pneumothorax.      Impression    IMPRESSION:  No acute cardiopulmonary disease.      ROSENDO CORRIGAN MD       Medications - No data to display    Assessments & Plan (with Medical Decision Making)     75 year old with complaints of palpitations. No chest pain or shortness of breath. History of low magnesium earlier this month with similar complaints. Labs as above. Mg level noted and replaced orally. No evidence of arrhythmia noted since arrival. After two hours of observation, patient discharged home and to follow up with pcp this week. Holter monitor ordered as outpatient. Patient understands to return if she develops any new or worsening symptoms. Recheck magnesium level with pcp in 12 to 24 hours.     Due to the nature of this electronic medical record, laboratory results, imaging results, diagnosis, other information and medications reported above may not represent information available to me at the the time of my care and disposition. Medications reported above may have not been ordered by me.     Portions of the record  may have been created with voice recognition software. Occasional wrong-word or 'sound-a- like' substitution may have occurred due to the inherent limitations of voice recognition software. Though the chart has been reviewed, there may be inadvertent transcription errors. Read the chart carefully and recognize, using context, where substitutions have occurred.       New Prescriptions    No medications on file       Final diagnoses:   Palpitations   Hypomagnesemia       3/30/2021   [facility]     Nasir Mireles MD  03/30/21 [id number]

## 2021-04-05 ENCOUNTER — HOSPITAL ENCOUNTER (OUTPATIENT)
Dept: CARDIOLOGY | Facility: HOSPITAL | Age: 76
Discharge: HOME | End: 2021-04-05
Attending: EMERGENCY MEDICINE | Admitting: INTERNAL MEDICINE
Payer: MEDICARE

## 2021-04-05 DIAGNOSIS — R00.2 PALPITATIONS: ICD-10-CM

## 2021-04-05 PROCEDURE — 93244 EXT ECG>48HR<7D REV&INTERPJ: CPT | Performed by: INTERNAL MEDICINE

## 2021-04-05 PROCEDURE — 93242 EXT ECG>48HR<7D RECORDING: CPT

## 2021-06-17 ENCOUNTER — MEDICAL CORRESPONDENCE (OUTPATIENT)
Dept: INTERVENTIONAL RADIOLOGY/VASCULAR | Facility: HOSPITAL | Age: 76
End: 2021-06-17

## 2021-06-22 ENCOUNTER — HOSPITAL ENCOUNTER (OUTPATIENT)
Facility: HOSPITAL | Age: 76
End: 2021-06-22
Admitting: RADIOLOGY
Payer: MEDICARE

## 2021-07-26 ENCOUNTER — HOSPITAL ENCOUNTER (OUTPATIENT)
Facility: HOSPITAL | Age: 76
Discharge: HOME | End: 2021-07-27
Attending: RADIOLOGY | Admitting: RADIOLOGY
Payer: MEDICARE

## 2021-07-26 ENCOUNTER — HOSPITAL ENCOUNTER (OUTPATIENT)
Dept: CT IMAGING | Facility: HOSPITAL | Age: 76
End: 2021-07-26
Attending: PAIN MEDICINE | Admitting: RADIOLOGY
Payer: MEDICARE

## 2021-07-26 DIAGNOSIS — M53.3 SACROCOCCYGEAL DISORDERS, NOT ELSEWHERE CLASSIFIED: ICD-10-CM

## 2021-07-26 PROCEDURE — 250N000009 HC RX 250: Performed by: RADIOLOGY

## 2021-07-26 PROCEDURE — 250N000011 HC RX IP 250 OP 636: Performed by: RADIOLOGY

## 2021-07-26 PROCEDURE — 27096 INJECT SACROILIAC JOINT: CPT

## 2021-07-26 RX ORDER — LIDOCAINE HYDROCHLORIDE 10 MG/ML
INJECTION, SOLUTION EPIDURAL; INFILTRATION; INTRACAUDAL; PERINEURAL
Status: DISCONTINUED
Start: 2021-07-26 | End: 2021-07-27 | Stop reason: HOSPADM

## 2021-07-26 RX ORDER — TRIAMCINOLONE ACETONIDE 40 MG/ML
40-80 INJECTION, SUSPENSION INTRA-ARTICULAR; INTRAMUSCULAR
Status: COMPLETED | OUTPATIENT
Start: 2021-07-26 | End: 2021-07-26

## 2021-07-26 RX ORDER — TRIAMCINOLONE ACETONIDE 40 MG/ML
INJECTION, SUSPENSION INTRA-ARTICULAR; INTRAMUSCULAR
Status: DISCONTINUED
Start: 2021-07-26 | End: 2021-07-27 | Stop reason: HOSPADM

## 2021-07-26 RX ORDER — LIDOCAINE HYDROCHLORIDE 10 MG/ML
10 INJECTION, SOLUTION EPIDURAL; INFILTRATION; INTRACAUDAL; PERINEURAL ONCE
Status: COMPLETED | OUTPATIENT
Start: 2021-07-26 | End: 2021-07-26

## 2021-07-26 RX ORDER — BUPIVACAINE HYDROCHLORIDE 5 MG/ML
10 INJECTION, SOLUTION EPIDURAL; INTRACAUDAL ONCE
Status: COMPLETED | OUTPATIENT
Start: 2021-07-26 | End: 2021-07-26

## 2021-07-26 RX ADMIN — TRIAMCINOLONE ACETONIDE 40 MG: 40 INJECTION, SUSPENSION INTRA-ARTICULAR; INTRAMUSCULAR at 16:10

## 2021-07-26 RX ADMIN — BUPIVACAINE HYDROCHLORIDE 3 ML: 5 INJECTION, SOLUTION EPIDURAL; INTRACAUDAL at 16:08

## 2021-07-26 RX ADMIN — LIDOCAINE HYDROCHLORIDE 2 ML: 10 INJECTION, SOLUTION EPIDURAL; INFILTRATION; INTRACAUDAL; PERINEURAL at 16:09

## 2021-07-26 NOTE — DISCHARGE INSTRUCTIONS
Discharge Instructions for an Radiology Injection    Home number on file [phone] (home)  Is it ok to leave a message at this number(s) Yes    Dr. Ruiz completed your procedure on 7/26/2021.    Current Pain Level (0-10 Scale): 5/10  Post Pain Level (0-10):  2/10      Activity Level:     Do not do any heavy activity or exercise for 24 hours.   Hip Injections:  Do not drive for 4 hours after your injection.  Diet:   Return to your normal diet.  Medications:   If you have stopped taking your Aspirin, Coumadin/Warfarin, Ibuprofen, or any   other blood thinner for this procedure you may resume in the morning unless   your primary care provider has given you other instructions.    Diabetics may see an increase in blood sugar after steroid injections. If you are concerned about your blood sugar, please contact your family doctor.    Site Care:  Remove the bandage and bathe or shower the morning after the procedure.      Please allow two weeks to experience improvement in your pain.  If you have any further issues, please contact your provider.  Call your Primary Care Provider if you have the following (if your primary care provider is not available please seek emergency care):   Nausea with vomiting   Severe headache   Drowsiness or confusion   Redness or drainage at the injection or puncture site   Temperature over 101 degrees F   Other concerns   Increasing joint pain

## 2021-08-10 ENCOUNTER — TELEPHONE (OUTPATIENT)
Dept: INTERVENTIONAL RADIOLOGY/VASCULAR | Facility: HOSPITAL | Age: 76
End: 2021-08-10

## 2021-08-10 NOTE — TELEPHONE ENCOUNTER
INJECTION POST CALL    Procedure: Right SI joint injection  Radiologist(s): Dr. Behzad Ruiz  Date of Procedure:  7/26/21    Relief of pain from this injection    A = 90%  A- = 85%  B = 80%  B- =75%  C = 70%  C- = 65%  D = 60%  D- = 50%  F = less than 50%    Do you feel this injection was beneficial?Yes  If yes, how long did it last? Currently relieving 80% of pain some on the right side, some days more than others    Where is the pain located? Pain is located now on the left low back above hip  Can you describe the pain? Aching     Does the pain radiate anywhere? no  If yes, where does the pain stop?n/a    Is this new pain? No      The patient had no questions or concerns.    Instruct patient to follow up with their provider for any further care they may need. (as Dr. Ruiz will no longer be making recommendations)    Lynda Hubbard

## 2022-04-06 NOTE — ED AVS SNAPSHOT
[facility]  [address] [address]  Phone: [phone]                                    Cee Pereira   MRN: [id number]    Department: [facility]   Date of Visit: 11/2/2020           After Visit Summary Signature Page    I have received my discharge instructions, and my questions have been answered. I have discussed any challenges I see with this plan with the nurse or doctor.    ..........................................................................................................................................  Patient/Patient Representative Signature      ..........................................................................................................................................  Patient Representative Print Name and Relationship to Patient    ..................................................               ................................................  Date                                   Time    ..........................................................................................................................................  Reviewed by Signature/Title    ...................................................              ..............................................  Date                                               Time          22EPIC Rev 08/18        [facility] admitting at bedside     Derek Arizmendi RN  04/06/22 [id number]

## 2022-04-13 ENCOUNTER — MEDICAL CORRESPONDENCE (OUTPATIENT)
Dept: INTERVENTIONAL RADIOLOGY/VASCULAR | Facility: HOSPITAL | Age: 77
End: 2022-04-13
Payer: MEDICARE

## 2022-05-10 ENCOUNTER — HOSPITAL ENCOUNTER (EMERGENCY)
Facility: HOSPITAL | Age: 77
Discharge: HOME | End: 2022-05-10
Attending: PHYSICIAN ASSISTANT | Admitting: PHYSICIAN ASSISTANT
Payer: MEDICARE

## 2022-05-10 ENCOUNTER — APPOINTMENT (OUTPATIENT)
Dept: CT IMAGING | Facility: HOSPITAL | Age: 77
End: 2022-05-10
Attending: PHYSICIAN ASSISTANT
Payer: MEDICARE

## 2022-05-10 VITALS
DIASTOLIC BLOOD PRESSURE: 94 MMHG | RESPIRATION RATE: 14 BRPM | HEART RATE: 76 BPM | OXYGEN SATURATION: 98 % | SYSTOLIC BLOOD PRESSURE: 161 MMHG

## 2022-05-10 DIAGNOSIS — S06.0X0A CONCUSSION WITHOUT LOSS OF CONSCIOUSNESS, INITIAL ENCOUNTER: ICD-10-CM

## 2022-05-10 DIAGNOSIS — S00.03XA CONTUSION OF SCALP, INITIAL ENCOUNTER: ICD-10-CM

## 2022-05-10 LAB
ALBUMIN SERPL-MCNC: 3.8 G/DL (ref 3.4–5)
ALP SERPL-CCNC: 82 U/L (ref 40–150)
ALT SERPL W P-5'-P-CCNC: 28 U/L (ref 0–50)
ANION GAP SERPL CALCULATED.3IONS-SCNC: 5 MMOL/L (ref 3–14)
AST SERPL W P-5'-P-CCNC: 23 U/L (ref 0–45)
BASOPHILS # BLD AUTO: 0 10E3/UL (ref 0–0.2)
BASOPHILS NFR BLD AUTO: 0 %
BILIRUB SERPL-MCNC: 0.3 MG/DL (ref 0.2–1.3)
BUN SERPL-MCNC: 24 MG/DL (ref 7–30)
CALCIUM SERPL-MCNC: 9.2 MG/DL (ref 8.5–10.1)
CHLORIDE BLD-SCNC: 102 MMOL/L (ref 94–109)
CO2 SERPL-SCNC: 28 MMOL/L (ref 20–32)
CREAT SERPL-MCNC: 1.27 MG/DL (ref 0.52–1.04)
EOSINOPHIL # BLD AUTO: 0.2 10E3/UL (ref 0–0.7)
EOSINOPHIL NFR BLD AUTO: 1 %
ERYTHROCYTE [DISTWIDTH] IN BLOOD BY AUTOMATED COUNT: 14.7 % (ref 10–15)
GFR SERPL CREATININE-BSD FRML MDRD: 44 ML/MIN/1.73M2
GLUCOSE BLD-MCNC: 113 MG/DL (ref 70–99)
GLUCOSE BLDC GLUCOMTR-MCNC: 92 MG/DL (ref 70–99)
HCT VFR BLD AUTO: 42.9 % (ref 35–47)
HGB BLD-MCNC: 13.6 G/DL (ref 11.7–15.7)
HOLD SPECIMEN: NORMAL
IMM GRANULOCYTES # BLD: 0 10E3/UL
IMM GRANULOCYTES NFR BLD: 0 %
INR PPP: 1.04 (ref 0.85–1.15)
LYMPHOCYTES # BLD AUTO: 2.9 10E3/UL (ref 0.8–5.3)
LYMPHOCYTES NFR BLD AUTO: 27 %
MCH RBC QN AUTO: 25.2 PG (ref 26.5–33)
MCHC RBC AUTO-ENTMCNC: 31.7 G/DL (ref 31.5–36.5)
MCV RBC AUTO: 80 FL (ref 78–100)
MONOCYTES # BLD AUTO: 0.8 10E3/UL (ref 0–1.3)
MONOCYTES NFR BLD AUTO: 8 %
NEUTROPHILS # BLD AUTO: 6.6 10E3/UL (ref 1.6–8.3)
NEUTROPHILS NFR BLD AUTO: 64 %
NRBC # BLD AUTO: 0 10E3/UL
NRBC BLD AUTO-RTO: 0 /100
PLATELET # BLD AUTO: 206 10E3/UL (ref 150–450)
POTASSIUM BLD-SCNC: 4 MMOL/L (ref 3.4–5.3)
PROT SERPL-MCNC: 7.8 G/DL (ref 6.8–8.8)
RBC # BLD AUTO: 5.39 10E6/UL (ref 3.8–5.2)
SODIUM SERPL-SCNC: 135 MMOL/L (ref 133–144)
WBC # BLD AUTO: 10.5 10E3/UL (ref 4–11)

## 2022-05-10 PROCEDURE — 99284 EMERGENCY DEPT VISIT MOD MDM: CPT | Mod: 25

## 2022-05-10 PROCEDURE — 72125 CT NECK SPINE W/O DYE: CPT | Mod: ME

## 2022-05-10 PROCEDURE — G1004 CDSM NDSC: HCPCS

## 2022-05-10 PROCEDURE — 85025 COMPLETE CBC W/AUTO DIFF WBC: CPT | Performed by: PHYSICIAN ASSISTANT

## 2022-05-10 PROCEDURE — 99284 EMERGENCY DEPT VISIT MOD MDM: CPT | Performed by: PHYSICIAN ASSISTANT

## 2022-05-10 PROCEDURE — 36415 COLL VENOUS BLD VENIPUNCTURE: CPT | Performed by: PHYSICIAN ASSISTANT

## 2022-05-10 PROCEDURE — 85610 PROTHROMBIN TIME: CPT | Performed by: PHYSICIAN ASSISTANT

## 2022-05-10 PROCEDURE — 80053 COMPREHEN METABOLIC PANEL: CPT | Performed by: PHYSICIAN ASSISTANT

## 2022-05-10 ASSESSMENT — ENCOUNTER SYMPTOMS
NECK PAIN: 1
CONSTITUTIONAL NEGATIVE: 1
WOUND: 1
CARDIOVASCULAR NEGATIVE: 1
HEADACHES: 1
ENDOCRINE NEGATIVE: 1
EYES NEGATIVE: 1
GASTROINTESTINAL NEGATIVE: 1
RESPIRATORY NEGATIVE: 1
CONFUSION: 1
DIZZINESS: 1

## 2022-05-11 NOTE — ED NOTES
Patient brought in by  as she had been outside after dinner this evening and she had a fall. Per  she was only out of the house maybe 15 minutes. The neighbor saw or heard her as she yelled for help. Patient does not remember going outside or what happened. The fist thing she remembers is getting into the car to come to the hospital. Per  she was confused about some things that she had just purchased as well as some things that were just brought over by her sister-in-law. She seems to know she is confused about some things and makes remarks in that she does remember some things, which are farther back then the last few days. Does endorse pain in her right forehead area where she has a small abrasion. She also endorses neck pain too.

## 2022-05-11 NOTE — DISCHARGE INSTRUCTIONS
Tylenol every 4-6 hours to help with headache  Ice to the front head where headache is occurring there may be some swelling and bruising over the next 24 to 48 hours.  Postconcussion information given to patient.  Turn if symptoms worsen or change  Charges have been in stable condition

## 2022-05-11 NOTE — ED TRIAGE NOTES
Patient presents to emergency room with spouse with c/o unwitnessed fall. Pt does not remember anything and not able to tell me what happened. Noted joelle to left eye brow. C/o neck pain. C-collar placed in triage. Bedside glucose 92

## 2022-05-11 NOTE — ED PROVIDER NOTES
History     Chief Complaint   Patient presents with     Fall     Confusion     Neck Pain     HPI  Cee Pereira is a 76 year old female who fall.  Patient was standing on the driveway which was slanted.  She slightly lost her balance and fell hitting the left side of her head.  This was witnessed states there was no loss of consciousness.  Patient was brought into her home by the neighbor and  felt that patient was having some trouble with memory.  Patient had 2 episodes of confusion that seems to be odd to him th so brought her to the ER for evaluation.  Has a small contusion left eyebrow.  Complains of a headache.  Does complain of some mild neck pain and was placed in a neck collar on arrival even though she walked in.  She denies any back pain chest pain any extremity pain.  She denies any nausea vomiting she did feel a little dizzy prior to arrival but that does seem to be better.  She seems to be alert and orientated on arrival currently.  She did not remember an outside but did remember coming in the car.    Allergies:  Allergies   Allergen Reactions     Diatrizoate      Other reaction(s): *Unknown     Iodides      Other reaction(s): *Unknown  Tincture     Ivp Dye [Contrast Dye]      Anaphylaxis     Lortab [Hydrocodone-Acetaminophen] Itching     headaches     No Clinical Screening - See Comments Itching     Pioglitazone Itching       Problem List:    Patient Active Problem List    Diagnosis Date Noted     Intra-abdominal abscess (H) 09/05/2019     Priority: Medium     LUQ abdominal pain 09/18/2018     Priority: Medium     GERD (gastroesophageal reflux disease) 09/18/2018     Priority: Medium     HTN (hypertension) 09/18/2018     Priority: Medium     Hyperlipidemia 09/18/2018     Priority: Medium     Viral gastroenteritis 05/23/2017     Priority: Medium     Dehydration 05/23/2017     Priority: Medium     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) 05/23/2017     Priority: Medium      Crohn's disease of large intestine without complication (H) 05/23/2017     Priority: Medium     Gastroenteritis 05/23/2017     Priority: Medium     Small bowel obstruction (H) 08/17/2016     Priority: Medium     SBO (small bowel obstruction) (H) 08/17/2016     Priority: Medium     Lumbar stenosis 07/30/2013     Priority: Medium     Lumbar disc herniation with radiculopathy 08/03/2012     Priority: Medium        Past Medical History:    Past Medical History:   Diagnosis Date     Diabetes mellitus (H)      Diverticulitis      Herniated lumbar intervertebral disc        Past Surgical History:    Past Surgical History:   Procedure Laterality Date     APPENDECTOMY OPEN       CHOLECYSTECTOMY       COLECTOMY WITHOUT COLOSTOMY      for diverticulitis     DECOMPRESSION LUMBAR ONE LEVEL  7/31/2013    L4-L5     ESOPHAGOSCOPY, GASTROSCOPY, DUODENOSCOPY (EGD), COMBINED N/A 9/6/2019    Procedure: UPPER ENDOSCOPY WITH BIOPSY;  Surgeon: Froy Barraza MD;  Location: [facility]     JOINT REPLACEMENT      bilateral hip replacements       Family History:    No family history on file.    Social History:  Marital Status:   [2]  Social History     Tobacco Use     Smoking status: Former Smoker     Smokeless tobacco: Never Used   Substance Use Topics     Alcohol use: No        Medications:    acetaminophen (TYLENOL) 325 MG tablet  aspirin-dipyridamole (AGGRENOX)  MG per 12 hr capsule  Coenzyme Q10 (COQ-10 PO)  docusate sodium (COLACE) 100 MG capsule  GABAPENTIN PO  GABAPENTIN PO  hydroxychloroquine (PLAQUENIL) 200 MG tablet  hydrOXYzine (ATARAX) 25 MG tablet  insulin degludec (TRESIBA FLEXTOUCH) 100 UNIT/ML pen  METOPROLOL TARTRATE PO  PANTOPRAZOLE SODIUM PO  SIMVASTATIN PO  sucralfate (CARAFATE) 1 GM tablet      Review of Systems   Constitutional: Negative.    HENT: Negative.    Eyes: Negative.    Respiratory: Negative.    Cardiovascular: Negative.    Gastrointestinal: Negative.    Endocrine: Negative.    Genitourinary:  Negative.    Musculoskeletal: Positive for neck pain.   Skin: Positive for wound (left eye brow).   Neurological: Positive for dizziness and headaches.   Psychiatric/Behavioral: Positive for confusion.       Physical Exam   BP: 177/96  Pulse: 76  Resp: 14  SpO2: 96 %      Physical Exam  Vitals and nursing note reviewed.   Constitutional:       Appearance: Normal appearance.   HENT:      Head: Abrasion and contusion present.     Neurological:      Mental Status: She is alert.         ED Course        76-year-old female presents to the ER no apparent distress her vitals are stable nontoxic.  Patient was placed in a c-collar on arrival secondary to a ground-level fall.  Patient was found to be slightly confused in her home.  Patient seems to be talking and is alert and orientated on arrival here in the ER.  He has a small contusion over the left eyebrow.  He is alert and orientated and answers my questions appropriately.  Patient was sent to the CT scanner for CT head without contrast as well as a cervical spine CT both of those came back completely normal with no signs of intercranial hemorrhage skull fracture or other pathology and no signs of any neck fracture.  Discussed findings with patient and .  The c-collar was removed.  Did a further neuro evaluation patient completed well with no signs of any complications she was not confused.  At this time I discussed with patient that she may have sustained a slight concussion we talked about postconcussion syndrome.  Patient will use some Tylenol ice for headache and pain.  Patient is to watch for any further symptoms and return if needed.  Recommendations are to do mild activity over the next couple of days.  Patient was discharged in stable condition.                 Trauma Summary Disposition     Patient is trauma admission:  Trauma  Evaluation    Intent to transfer: 5/10/2022 @ 2015    Spine  Backboard removal time: no back board   C-collar and immobilization:  applied in ED on initial evaluation.  CSpine Clearance: Patient left in collar, Ct scan of neck and head negative  Full Primary and Secondary survey with appropriate immobilization of spine completed in exam section.     Neuro  GCS at arrival:  Motor 6=Obeys commands   Verbal 5=Oriented   Eye Opening 4=Spontaneous   Total: 15     GCS at disposition: unchanged    ED Procedures completed  none    Transfer Consultant:  Patient s status reviewed with Dr. Blandon, no further workup, neuro exam negative.  Patient discharged home.           Results for orders placed or performed during the hospital encounter of 05/10/22 (from the past 24 hour(s))   Glucose by meter   Result Value Ref Range    GLUCOSE BY METER POCT 92 70 - 99 mg/dL   Extra Tube    Narrative    The following orders were created for panel order Extra Tube.  Procedure                               Abnormality         Status                     ---------                               -----------         ------                     Extra Red Top Tube[[id number]]                               In process                   Please view results for these tests on the individual orders.   CBC with platelets differential    Narrative    The following orders were created for panel order CBC with platelets differential.  Procedure                               Abnormality         Status                     ---------                               -----------         ------                     CBC with platelets and d...[[id number]]  Abnormal            Final result                 Please view results for these tests on the individual orders.   Comprehensive metabolic panel   Result Value Ref Range    Sodium 135 133 - 144 mmol/L    Potassium 4.0 3.4 - 5.3 mmol/L    Chloride 102 94 - 109 mmol/L    Carbon Dioxide (CO2) 28 20 - 32 mmol/L    Anion Gap 5 3 - 14 mmol/L    Urea Nitrogen 24 7 - 30 mg/dL    Creatinine 1.27 (H) 0.52 - 1.04 mg/dL    Calcium 9.2 8.5 - 10.1 mg/dL    Glucose  113 (H) 70 - 99 mg/dL    Alkaline Phosphatase 82 40 - 150 U/L    AST 23 0 - 45 U/L    ALT 28 0 - 50 U/L    Protein Total 7.8 6.8 - 8.8 g/dL    Albumin 3.8 3.4 - 5.0 g/dL    Bilirubin Total 0.3 0.2 - 1.3 mg/dL    GFR Estimate 44 (L) >60 mL/min/1.73m2   INR   Result Value Ref Range    INR 1.04 0.85 - 1.15   CBC with platelets and differential   Result Value Ref Range    WBC Count 10.5 4.0 - 11.0 10e3/uL    RBC Count 5.39 (H) 3.80 - 5.20 10e6/uL    Hemoglobin 13.6 11.7 - 15.7 g/dL    Hematocrit 42.9 35.0 - 47.0 %    MCV 80 78 - 100 fL    MCH 25.2 (L) 26.5 - 33.0 pg    MCHC 31.7 31.5 - 36.5 g/dL    RDW 14.7 10.0 - 15.0 %    Platelet Count 206 150 - 450 10e3/uL    % Neutrophils 64 %    % Lymphocytes 27 %    % Monocytes 8 %    % Eosinophils 1 %    % Basophils 0 %    % Immature Granulocytes 0 %    NRBCs per 100 WBC 0 <1 /100    Absolute Neutrophils 6.6 1.6 - 8.3 10e3/uL    Absolute Lymphocytes 2.9 0.8 - 5.3 10e3/uL    Absolute Monocytes 0.8 0.0 - 1.3 10e3/uL    Absolute Eosinophils 0.2 0.0 - 0.7 10e3/uL    Absolute Basophils 0.0 0.0 - 0.2 10e3/uL    Absolute Immature Granulocytes 0.0 <=0.4 10e3/uL    Absolute NRBCs 0.0 10e3/uL   Head CT w/o contrast    Narrative    PROCEDURE: CT HEAD W/O CONTRAST     HISTORY: Head trauma, minor (Age >= 65y).    COMPARISON: 12/29/2016    TECHNIQUE:  Helical images of the head from the foramen magnum to the  vertex were obtained without contrast.    FINDINGS: The ventricles and sulci are prominent, compatible with  mild, generalized volume loss. No acute intracranial hemorrhage, mass  effect, midline shift, hydrocephalus or basilar cystern effacement are  present.    The grey-white matter interface is preserved. Moderate microvascular  disease is worse when compared to 2016.    The calvarium is intact.       Impression    IMPRESSION: No acute intracranial hemorrhage.      MEREDITH HUA MD         SYSTEM ID:  [id number]   Cervical spine CT w/o contrast    Narrative    PROCEDURE: CT  CERVICAL SPINE W/O CONTRAST     HISTORY: Neck trauma (Age >= 65y); fall neck pain.    TECHNIQUE: Helical noncontrast CT images of the cervical spine.    COMPARISON: MR 11/25/2020    FINDINGS:     No acute fracture is identified. The vertebral bodies are normal in  height. The cervical lordosis is preserved. Changes of prior ACDF at  C6-7 are redemonstrated. The C1-2 articulation and the craniocervical  junction are intact.     Degenerative changes are chronic. No severe spinal stenosis.     The paravertebral soft tissues demonstrate atherosclerotic  calcifications. The lung apices are clear.      Impression    IMPRESSION: No evidence of acute cervical spine fracture.    MEREDITH HUA MD         SYSTEM ID:  [id number]       Medications - No data to display    Assessments & Plan (with Medical Decision Making)     I have reviewed the nursing notes.    I have reviewed the findings, diagnosis, plan and need for follow up with the patient.      Current Discharge Medication List          Final diagnoses:   Contusion of scalp, initial encounter   Concussion without loss of consciousness, initial encounter       5/10/2022   [facility]     Aileen Baeza PA-C  05/10/22 2250       Aileen Baeza PA-C  05/10/22 225

## 2022-08-15 ENCOUNTER — HOSPITAL ENCOUNTER (EMERGENCY)
Facility: HOSPITAL | Age: 77
Discharge: HOME | End: 2022-08-15
Attending: NURSE PRACTITIONER | Admitting: NURSE PRACTITIONER
Payer: MEDICARE

## 2022-08-15 ENCOUNTER — APPOINTMENT (OUTPATIENT)
Dept: GENERAL RADIOLOGY | Facility: HOSPITAL | Age: 77
End: 2022-08-15
Attending: NURSE PRACTITIONER
Payer: MEDICARE

## 2022-08-15 VITALS
OXYGEN SATURATION: 96 % | DIASTOLIC BLOOD PRESSURE: 87 MMHG | HEART RATE: 80 BPM | SYSTOLIC BLOOD PRESSURE: 161 MMHG | TEMPERATURE: 97.7 F | RESPIRATION RATE: 16 BRPM

## 2022-08-15 DIAGNOSIS — R07.9 CHEST PAIN, UNSPECIFIED TYPE: Primary | ICD-10-CM

## 2022-08-15 LAB
ANION GAP SERPL CALCULATED.3IONS-SCNC: 2 MMOL/L (ref 3–14)
BASOPHILS # BLD AUTO: 0 10E3/UL (ref 0–0.2)
BASOPHILS NFR BLD AUTO: 0 %
BUN SERPL-MCNC: 20 MG/DL (ref 7–30)
CALCIUM SERPL-MCNC: 9.1 MG/DL (ref 8.5–10.1)
CHLORIDE BLD-SCNC: 107 MMOL/L (ref 94–109)
CO2 SERPL-SCNC: 29 MMOL/L (ref 20–32)
CREAT SERPL-MCNC: 0.97 MG/DL (ref 0.52–1.04)
EOSINOPHIL # BLD AUTO: 0.1 10E3/UL (ref 0–0.7)
EOSINOPHIL NFR BLD AUTO: 1 %
ERYTHROCYTE [DISTWIDTH] IN BLOOD BY AUTOMATED COUNT: 14.4 % (ref 10–15)
GFR SERPL CREATININE-BSD FRML MDRD: 61 ML/MIN/1.73M2
GLUCOSE BLD-MCNC: 80 MG/DL (ref 70–99)
HCT VFR BLD AUTO: 42.5 % (ref 35–47)
HGB BLD-MCNC: 13.7 G/DL (ref 11.7–15.7)
HOLD SPECIMEN: NORMAL
IMM GRANULOCYTES # BLD: 0 10E3/UL
IMM GRANULOCYTES NFR BLD: 0 %
LYMPHOCYTES # BLD AUTO: 2.4 10E3/UL (ref 0.8–5.3)
LYMPHOCYTES NFR BLD AUTO: 24 %
MCH RBC QN AUTO: 26 PG (ref 26.5–33)
MCHC RBC AUTO-ENTMCNC: 32.2 G/DL (ref 31.5–36.5)
MCV RBC AUTO: 81 FL (ref 78–100)
MONOCYTES # BLD AUTO: 0.9 10E3/UL (ref 0–1.3)
MONOCYTES NFR BLD AUTO: 9 %
NEUTROPHILS # BLD AUTO: 6.5 10E3/UL (ref 1.6–8.3)
NEUTROPHILS NFR BLD AUTO: 66 %
NRBC # BLD AUTO: 0 10E3/UL
NRBC BLD AUTO-RTO: 0 /100
PLATELET # BLD AUTO: 153 10E3/UL (ref 150–450)
POTASSIUM BLD-SCNC: 3.7 MMOL/L (ref 3.4–5.3)
RBC # BLD AUTO: 5.26 10E6/UL (ref 3.8–5.2)
SODIUM SERPL-SCNC: 138 MMOL/L (ref 133–144)
TROPONIN I SERPL HS-MCNC: 6 NG/L
WBC # BLD AUTO: 9.9 10E3/UL (ref 4–11)

## 2022-08-15 PROCEDURE — 84484 ASSAY OF TROPONIN QUANT: CPT | Performed by: NURSE PRACTITIONER

## 2022-08-15 PROCEDURE — 93010 ELECTROCARDIOGRAM REPORT: CPT | Performed by: INTERNAL MEDICINE

## 2022-08-15 PROCEDURE — 80048 BASIC METABOLIC PNL TOTAL CA: CPT | Performed by: NURSE PRACTITIONER

## 2022-08-15 PROCEDURE — 36415 COLL VENOUS BLD VENIPUNCTURE: CPT | Performed by: NURSE PRACTITIONER

## 2022-08-15 PROCEDURE — 99214 OFFICE O/P EST MOD 30 MIN: CPT | Performed by: NURSE PRACTITIONER

## 2022-08-15 PROCEDURE — 85025 COMPLETE CBC W/AUTO DIFF WBC: CPT | Performed by: NURSE PRACTITIONER

## 2022-08-15 PROCEDURE — 93005 ELECTROCARDIOGRAM TRACING: CPT | Mod: RTG

## 2022-08-15 PROCEDURE — G0463 HOSPITAL OUTPT CLINIC VISIT: HCPCS | Mod: 25

## 2022-08-15 PROCEDURE — 71046 X-RAY EXAM CHEST 2 VIEWS: CPT

## 2022-08-15 ASSESSMENT — ENCOUNTER SYMPTOMS
NAUSEA: 0
COUGH: 0
DIARRHEA: 0
SHORTNESS OF BREATH: 0
FEVER: 0
CHILLS: 0
VOMITING: 0

## 2022-08-15 ASSESSMENT — ACTIVITIES OF DAILY LIVING (ADL): ADLS_ACUITY_SCORE: 37

## 2022-08-15 NOTE — ED TRIAGE NOTES
Ongoing chest pain for 7 days, unsure of the cause, denies cough, sob, smoking, or vaping. Painful in sternum and radiates left side into armpit. Worse with movement. Feels fine with movement. Therapies tried, ibuprofen, and tramadol, relieves the pain for a little while

## 2022-08-15 NOTE — ED TRIAGE NOTES
Pt presents with c/o chest wall pain that has been presents for about a week, pain is reproducible.

## 2022-08-15 NOTE — DISCHARGE INSTRUCTIONS
Continue taking Tylenol or ibuprofen as needed for your pain.  Apply warm compresses to your chest.      Follow-up with your primary medical provider for reevaluation in 3 to 5 days.    Return to the emergency department for worsening and/or concerning symptoms.

## 2022-08-15 NOTE — ED PROVIDER NOTES
History     Chief Complaint   Patient presents with     Chest Wall Pain     HPI  Cee Pereira is a 76 year old female with primary medical history of diabetes mellitus, hypertension, hyperlipidemia, GERD and Crohn's disease who presents with her spouse for evaluation of chest pain.  Onset 1 week ago.  Patient tells me that she has pain over her sternum and going to the left side of her chest.  She describes the pain as aching.  Earlier this morning it was starting to go to her armpit and arm which prompted today's visit.  She denies any shortness of breath, cough, fever or chills.  No nausea or vomiting.  Patient tells me that there was a neighbors dog that may have jumped up to her and hit her in the chest several days ago.  Dog is about 50 pounds.  She did not get knocked down.  She has tried ibuprofen and tramadol with pain resolving for a little bit before it starts up again.    No tobacco use.    Allergies:  Allergies   Allergen Reactions     Diatrizoate      Other reaction(s): *Unknown     Iodides      Other reaction(s): *Unknown  Tincture     Ivp Dye [Contrast Dye]      Anaphylaxis     Lortab [Hydrocodone-Acetaminophen] Itching     headaches     No Clinical Screening - See Comments Itching     Pioglitazone Itching       Problem List:    Patient Active Problem List    Diagnosis Date Noted     Intra-abdominal abscess (H) 09/05/2019     Priority: Medium     LUQ abdominal pain 09/18/2018     Priority: Medium     GERD (gastroesophageal reflux disease) 09/18/2018     Priority: Medium     HTN (hypertension) 09/18/2018     Priority: Medium     Hyperlipidemia 09/18/2018     Priority: Medium     Viral gastroenteritis 05/23/2017     Priority: Medium     Dehydration 05/23/2017     Priority: Medium     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) 05/23/2017     Priority: Medium     Crohn's disease of large intestine without complication (H) 05/23/2017     Priority: Medium     Gastroenteritis 05/23/2017      Priority: Medium     Small bowel obstruction (H) 08/17/2016     Priority: Medium     SBO (small bowel obstruction) (H) 08/17/2016     Priority: Medium     Lumbar stenosis 07/30/2013     Priority: Medium     Lumbar disc herniation with radiculopathy 08/03/2012     Priority: Medium        Past Medical History:    Past Medical History:   Diagnosis Date     Diabetes mellitus (H)      Diverticulitis      Herniated lumbar intervertebral disc        Past Surgical History:    Past Surgical History:   Procedure Laterality Date     APPENDECTOMY OPEN       CHOLECYSTECTOMY       COLECTOMY WITHOUT COLOSTOMY      for diverticulitis     DECOMPRESSION LUMBAR ONE LEVEL  7/31/2013    L4-L5     ESOPHAGOSCOPY, GASTROSCOPY, DUODENOSCOPY (EGD), COMBINED N/A 9/6/2019    Procedure: UPPER ENDOSCOPY WITH BIOPSY;  Surgeon: Froy Barraza MD;  Location: [facility]     JOINT REPLACEMENT      bilateral hip replacements       Family History:    No family history on file.    Social History:  Marital Status:   [2]  Social History     Tobacco Use     Smoking status: Former Smoker     Smokeless tobacco: Never Used   Substance Use Topics     Alcohol use: No        Medications:    acetaminophen (TYLENOL) 325 MG tablet  aspirin-dipyridamole (AGGRENOX)  MG per 12 hr capsule  Coenzyme Q10 (COQ-10 PO)  docusate sodium (COLACE) 100 MG capsule  GABAPENTIN PO  GABAPENTIN PO  hydroxychloroquine (PLAQUENIL) 200 MG tablet  hydrOXYzine (ATARAX) 25 MG tablet  insulin degludec (TRESIBA) 100 UNIT/ML pen  METOPROLOL TARTRATE PO  PANTOPRAZOLE SODIUM PO  SIMVASTATIN PO  sucralfate (CARAFATE) 1 GM tablet          Review of Systems   Constitutional: Negative for chills and fever.   Respiratory: Negative for cough and shortness of breath.    Cardiovascular: Positive for chest pain.   Gastrointestinal: Negative for diarrhea, nausea and vomiting.   All other systems reviewed and are negative.      Physical Exam   BP: 161/87  Pulse: 80  Temp: 97.7  F (36.5   C)  Resp: 16  SpO2: 96 %      Physical Exam  Vitals and nursing note reviewed.   Constitutional:       General: She is not in acute distress.     Appearance: Normal appearance. She is not diaphoretic.   HENT:      Head: Atraumatic.   Eyes:      Pupils: Pupils are equal, round, and reactive to light.   Cardiovascular:      Rate and Rhythm: Normal rate and regular rhythm.      Heart sounds: Normal heart sounds. No murmur heard.    No friction rub. No gallop.   Pulmonary:      Effort: Pulmonary effort is normal. No respiratory distress.      Breath sounds: Normal breath sounds.   Chest:      Chest wall: Tenderness present. No crepitus or edema.          Comments: Swelling, bruising or obvious deformity to chest.  Abdominal:      Tenderness: There is no abdominal tenderness.   Musculoskeletal:         General: No tenderness. Normal range of motion.      Cervical back: Neck supple.   Skin:     General: Skin is warm.      Capillary Refill: Capillary refill takes less than 2 seconds.      Findings: No bruising, erythema, lesion or rash.   Neurological:      Mental Status: She is alert and oriented to person, place, and time.         ED Course                 Procedures              EKG Interpretation:      Interpreted by Teresa Carson CNP  Time reviewed: 1300  Symptoms at time of EKG: Chest pain  Rhythm: normal sinus   Rate: normal  Axis: normal  Ectopy: none  Conduction: normal  ST Segments/ T Waves: No ST-T wave changes  Q Waves: none  Comparison to prior: Unchanged    Clinical Impression: normal EKG       Results for orders placed or performed during the hospital encounter of 08/15/22 (from the past 24 hour(s))   CBC with platelets differential    Narrative    The following orders were created for panel order CBC with platelets differential.  Procedure                               Abnormality         Status                     ---------                               -----------         ------                     CBC  with platelets and d...[[id number]]  Abnormal            Final result                 Please view results for these tests on the individual orders.   Basic metabolic panel   Result Value Ref Range    Sodium 138 133 - 144 mmol/L    Potassium 3.7 3.4 - 5.3 mmol/L    Chloride 107 94 - 109 mmol/L    Carbon Dioxide (CO2) 29 20 - 32 mmol/L    Anion Gap 2 (L) 3 - 14 mmol/L    Urea Nitrogen 20 7 - 30 mg/dL    Creatinine 0.97 0.52 - 1.04 mg/dL    Calcium 9.1 8.5 - 10.1 mg/dL    Glucose 80 70 - 99 mg/dL    GFR Estimate 61 >60 mL/min/1.73m2   Troponin I   Result Value Ref Range    Troponin I High Sensitivity 6 <54 ng/L   CBC with platelets and differential   Result Value Ref Range    WBC Count 9.9 4.0 - 11.0 10e3/uL    RBC Count 5.26 (H) 3.80 - 5.20 10e6/uL    Hemoglobin 13.7 11.7 - 15.7 g/dL    Hematocrit 42.5 35.0 - 47.0 %    MCV 81 78 - 100 fL    MCH 26.0 (L) 26.5 - 33.0 pg    MCHC 32.2 31.5 - 36.5 g/dL    RDW 14.4 10.0 - 15.0 %    Platelet Count 153 150 - 450 10e3/uL    % Neutrophils 66 %    % Lymphocytes 24 %    % Monocytes 9 %    % Eosinophils 1 %    % Basophils 0 %    % Immature Granulocytes 0 %    NRBCs per 100 WBC 0 <1 /100    Absolute Neutrophils 6.5 1.6 - 8.3 10e3/uL    Absolute Lymphocytes 2.4 0.8 - 5.3 10e3/uL    Absolute Monocytes 0.9 0.0 - 1.3 10e3/uL    Absolute Eosinophils 0.1 0.0 - 0.7 10e3/uL    Absolute Basophils 0.0 0.0 - 0.2 10e3/uL    Absolute Immature Granulocytes 0.0 <=0.4 10e3/uL    Absolute NRBCs 0.0 10e3/uL   Extra Tube    Narrative    The following orders were created for panel order Extra Tube.  Procedure                               Abnormality         Status                     ---------                               -----------         ------                     Extra Blue Top Tube[[id number]]                              In process                 Extra Red Top Tube[[id number]]                               In process                 Extra Green Top (Lithium...[[id number]]                      In  process                   Please view results for these tests on the individual orders.   XR Chest 2 Views    Narrative    PROCEDURE: XR CHEST 2 VIEWS 8/15/2022 1:23 PM    HISTORY: sternum and left chest pain x 1 week, no cough, no known  recent trauma    COMPARISONS: 3/30/2021.    TECHNIQUE: 2 views.     FINDINGS: Heart is not enlarged. 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  Lungs are clear and no pleural effusion is seen.    Degenerative changes seen in the spine. There are surgical clips in  the right upper quadrant.           Impression    IMPRESSION: No acute infiltrate.    ELSY UMANZOR MD         SYSTEM ID:  [id number]       Medications - No data to display    Assessments & Plan (with Medical Decision Making)     I have reviewed the nursing notes.    Pleasant 76-year-old female that presented for evaluation of chest pain that started over a week ago.  Could have possibly been hit in the chest by a 50 pound dog.  Pain to her sternum and left side of her chest is reproducible with palpation.  Her respirations are nonlabored.  EKG shows a normal sinus rhythm with no ST elevation or depression.  Normal QT.  Lab findings are also reassuring without leukocytosis, electrolyte abnormalities.  Normal kidney function.  Negative troponin.  Considering symptoms have been going on for a week no delta troponin was completed today.  Chest x-ray shows no acute findings-also discussed with radiologist who had noted ectasia of the aorta but notes this finding is unchanged from previous x-rays within the last 2 years.    Discussed all findings with patient and spouse.  Low concern for ACS today.  Could likely be musculoskeletal pain from the dog jumping on her.  Recommended continuing with ibuprofen/Tylenol or her tramadol as needed for pain.  Apply warm compresses over the chest.  Close follow-up with primary medical provider for reevaluation.  Return to ED/UC for worsening and or concerning symptoms.  Patient verbalized  understanding.    I have reviewed the findings, diagnosis, plan and need for follow up with the patient.  This document was prepared using a combination of typing and voice generated software.  While every attempt was made for accuracy, spelling and grammatical errors may exist.    New Prescriptions    No medications on file       Final diagnoses:   Chest pain, unspecified type       8/15/2022   [facility]     Mpofu, Prudence, CNP  08/15/22 1412       Mpofu, Prudence, CNP  08/15/22 1412

## 2023-04-05 ENCOUNTER — APPOINTMENT (OUTPATIENT)
Dept: GENERAL RADIOLOGY | Facility: HOSPITAL | Age: 78
End: 2023-04-05
Attending: NURSE PRACTITIONER
Payer: MEDICARE

## 2023-04-05 ENCOUNTER — HOSPITAL ENCOUNTER (EMERGENCY)
Facility: HOSPITAL | Age: 78
Discharge: HOME | End: 2023-04-05
Attending: NURSE PRACTITIONER | Admitting: NURSE PRACTITIONER
Payer: MEDICARE

## 2023-04-05 VITALS
WEIGHT: 145 LBS | DIASTOLIC BLOOD PRESSURE: 76 MMHG | SYSTOLIC BLOOD PRESSURE: 168 MMHG | OXYGEN SATURATION: 95 % | TEMPERATURE: 96.8 F | HEIGHT: 66 IN | RESPIRATION RATE: 18 BRPM | HEART RATE: 78 BPM | BODY MASS INDEX: 23.3 KG/M2

## 2023-04-05 DIAGNOSIS — S79.912A HIP INJURY, LEFT, INITIAL ENCOUNTER: ICD-10-CM

## 2023-04-05 PROCEDURE — 250N000013 HC RX MED GY IP 250 OP 250 PS 637: Performed by: NURSE PRACTITIONER

## 2023-04-05 PROCEDURE — 73502 X-RAY EXAM HIP UNI 2-3 VIEWS: CPT

## 2023-04-05 PROCEDURE — G0463 HOSPITAL OUTPT CLINIC VISIT: HCPCS

## 2023-04-05 PROCEDURE — 99213 OFFICE O/P EST LOW 20 MIN: CPT | Performed by: NURSE PRACTITIONER

## 2023-04-05 RX ORDER — HYDROCODONE BITARTRATE AND ACETAMINOPHEN 5; 325 MG/1; MG/1
1 TABLET ORAL EVERY 6 HOURS PRN
Qty: 12 TABLET | Refills: 0 | Status: SHIPPED | OUTPATIENT
Start: 2023-04-05 | End: 2024-09-11

## 2023-04-05 RX ORDER — HYDROCODONE BITARTRATE AND ACETAMINOPHEN 5; 325 MG/1; MG/1
1 TABLET ORAL ONCE
Status: COMPLETED | OUTPATIENT
Start: 2023-04-05 | End: 2023-04-05

## 2023-04-05 RX ORDER — ACETAMINOPHEN 325 MG/1
325 TABLET ORAL ONCE
Status: COMPLETED | OUTPATIENT
Start: 2023-04-05 | End: 2023-04-05

## 2023-04-05 RX ADMIN — HYDROCODONE BITARTRATE AND ACETAMINOPHEN 1 TABLET: 5; 325 TABLET ORAL at 12:53

## 2023-04-05 RX ADMIN — ACETAMINOPHEN 325 MG: 325 TABLET ORAL at 12:54

## 2023-04-05 ASSESSMENT — ENCOUNTER SYMPTOMS
FEVER: 0
NAUSEA: 1
VOMITING: 0
ARTHRALGIAS: 1
ACTIVITY CHANGE: 1
CHILLS: 0
NUMBNESS: 1

## 2023-04-05 ASSESSMENT — ACTIVITIES OF DAILY LIVING (ADL): ADLS_ACUITY_SCORE: 37

## 2023-04-05 NOTE — ED TRIAGE NOTES
Pt presents with c/o left hip pain able to ambulate and stand but sitting causes discomfort. Pt slipped and fell on ice on Saturday. Pt has hx of artifical hips. Pt has been using heating pad and aleve and tylenol which have not helped.

## 2023-04-05 NOTE — ED TRIAGE NOTES
Pt states she slipped and fell on Saturday and fell onto her left hip. Pt c/o left hip pain. Has been using ice/heat and alleve. Pt able to ambulate without much difficulty.

## 2023-04-05 NOTE — DISCHARGE INSTRUCTIONS
Keep affected extremity elevated as much as possible for next 24 - 48 hours. Ice to affected area 20 minutes every hour as needed for comfort. After 48 hours you can apply heat.     Hydrocodone is combined with  325 mg of acetaminophen (Tylenol)  For severe to moderate pain take one hydrocodone with 325 to 650 mg of acetaminophen (Tylenol). For mild pain take Tylenol 650 to 1000 mg every four to six hours as needed (not to exceed more than 4000 mg in a 24 hour period).  BE aware using narcotics can increase your risk of falling so be very careful with ambulation and getting in and out of bed and standing up from chairs.      Use walker when up ambulating, especially at night when getting up out of bed and going to the bathroom.      Suggest medicating around the clock for the next 24-48 hours. . Slowly start to wiggle your left foot and move left hip as often as possible but not beyond the point of pain. Follow up with primary provider or orthopedics as needed

## 2023-06-07 ENCOUNTER — HOSPITAL ENCOUNTER (EMERGENCY)
Facility: HOSPITAL | Age: 78
Discharge: HOME | End: 2023-06-07
Attending: EMERGENCY MEDICINE | Admitting: EMERGENCY MEDICINE
Payer: MEDICARE

## 2023-06-07 VITALS
DIASTOLIC BLOOD PRESSURE: 82 MMHG | SYSTOLIC BLOOD PRESSURE: 166 MMHG | HEART RATE: 85 BPM | RESPIRATION RATE: 16 BRPM | TEMPERATURE: 97.3 F | OXYGEN SATURATION: 97 %

## 2023-06-07 DIAGNOSIS — N30.00 ACUTE CYSTITIS WITHOUT HEMATURIA: ICD-10-CM

## 2023-06-07 LAB
ALBUMIN UR-MCNC: 100 MG/DL
APPEARANCE UR: ABNORMAL
BACTERIA #/AREA URNS HPF: ABNORMAL /HPF
BILIRUB UR QL STRIP: NEGATIVE
COLOR UR AUTO: ABNORMAL
GLUCOSE UR STRIP-MCNC: NEGATIVE MG/DL
HGB UR QL STRIP: ABNORMAL
KETONES UR STRIP-MCNC: NEGATIVE MG/DL
LEUKOCYTE ESTERASE UR QL STRIP: ABNORMAL
MUCOUS THREADS #/AREA URNS LPF: PRESENT /LPF
NITRATE UR QL: NEGATIVE
PH UR STRIP: 6 [PH] (ref 4.7–8)
RBC URINE: >182 /HPF
SP GR UR STRIP: 1.01 (ref 1–1.03)
SQUAMOUS EPITHELIAL: 0 /HPF
TRANSITIONAL EPI: 1 /HPF
UROBILINOGEN UR STRIP-MCNC: NORMAL MG/DL
WBC URINE: 160 /HPF

## 2023-06-07 PROCEDURE — 99284 EMERGENCY DEPT VISIT MOD MDM: CPT

## 2023-06-07 PROCEDURE — 81001 URINALYSIS AUTO W/SCOPE: CPT | Performed by: EMERGENCY MEDICINE

## 2023-06-07 PROCEDURE — 87086 URINE CULTURE/COLONY COUNT: CPT | Performed by: EMERGENCY MEDICINE

## 2023-06-07 PROCEDURE — 99284 EMERGENCY DEPT VISIT MOD MDM: CPT | Performed by: EMERGENCY MEDICINE

## 2023-06-07 RX ORDER — NITROFURANTOIN 25; 75 MG/1; MG/1
100 CAPSULE ORAL 2 TIMES DAILY
Qty: 10 CAPSULE | Refills: 0 | Status: SHIPPED | OUTPATIENT
Start: 2023-06-07 | End: 2023-06-12

## 2023-06-07 RX ORDER — PHENAZOPYRIDINE HYDROCHLORIDE 200 MG/1
200 TABLET, FILM COATED ORAL 3 TIMES DAILY
Qty: 9 TABLET | Refills: 0 | Status: SHIPPED | OUTPATIENT
Start: 2023-06-07 | End: 2023-06-10

## 2023-06-07 ASSESSMENT — ENCOUNTER SYMPTOMS
CONSTITUTIONAL NEGATIVE: 1
DYSURIA: 1
ENDOCRINE NEGATIVE: 1
DIFFICULTY URINATING: 1
GASTROINTESTINAL NEGATIVE: 1
RESPIRATORY NEGATIVE: 1
CARDIOVASCULAR NEGATIVE: 1
MUSCULOSKELETAL NEGATIVE: 1
NEUROLOGICAL NEGATIVE: 1
EYES NEGATIVE: 1

## 2023-06-07 NOTE — ED TRIAGE NOTES
States that Sunday she started having UTI symptoms of urinary frequency and pain with urination.      Triage Assessment     Row Name 06/07/23 0643       Triage Assessment (Adult)    Airway WDL WDL

## 2023-06-07 NOTE — ED PROVIDER NOTES
History     Chief Complaint   Patient presents with     Urinary Frequency     HPI  Cee Pereira is a 77 year old female who to the emergency department complaining of urinary frequency and dysuria which started this weekend.  Patient states that she was up quite frequently throughout the night last night going to the bathroom.  She denies hematuria.  She does have some mild low back pain with this as well.  She has not had fevers or shaking chills.  She has not had nausea or vomiting.  She denies other symptoms at this time.    Allergies:  Allergies   Allergen Reactions     Diatrizoate      Other reaction(s): *Unknown     Iodides      Other reaction(s): *Unknown  Tincture     Ivp Dye [Contrast Dye]      Anaphylaxis     Lortab [Hydrocodone-Acetaminophen] Itching     headaches     No Clinical Screening - See Comments Itching     Pioglitazone Itching       Problem List:    Patient Active Problem List    Diagnosis Date Noted     Intra-abdominal abscess (H) 09/05/2019     Priority: Medium     LUQ abdominal pain 09/18/2018     Priority: Medium     GERD (gastroesophageal reflux disease) 09/18/2018     Priority: Medium     HTN (hypertension) 09/18/2018     Priority: Medium     Hyperlipidemia 09/18/2018     Priority: Medium     Viral gastroenteritis 05/23/2017     Priority: Medium     Dehydration 05/23/2017     Priority: Medium     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) 05/23/2017     Priority: Medium     Crohn's disease of large intestine without complication (H) 05/23/2017     Priority: Medium     Gastroenteritis 05/23/2017     Priority: Medium     Small bowel obstruction (H) 08/17/2016     Priority: Medium     SBO (small bowel obstruction) (H) 08/17/2016     Priority: Medium     Lumbar stenosis 07/30/2013     Priority: Medium     Lumbar disc herniation with radiculopathy 08/03/2012     Priority: Medium        Past Medical History:    Past Medical History:   Diagnosis Date     Diabetes mellitus (H)       Diverticulitis      Herniated lumbar intervertebral disc        Past Surgical History:    Past Surgical History:   Procedure Laterality Date     APPENDECTOMY OPEN       CHOLECYSTECTOMY       COLECTOMY WITHOUT COLOSTOMY      for diverticulitis     DECOMPRESSION LUMBAR ONE LEVEL  7/31/2013    L4-L5     ESOPHAGOSCOPY, GASTROSCOPY, DUODENOSCOPY (EGD), COMBINED N/A 9/6/2019    Procedure: UPPER ENDOSCOPY WITH BIOPSY;  Surgeon: Froy Barraza MD;  Location: [facility]     JOINT REPLACEMENT      bilateral hip replacements       Family History:    No family history on file.    Social History:  Marital Status:   [2]  Social History     Tobacco Use     Smoking status: Former     Smokeless tobacco: Never   Substance Use Topics     Alcohol use: No        Medications:    acetaminophen (TYLENOL) 325 MG tablet  aspirin-dipyridamole (AGGRENOX)  MG per 12 hr capsule  Coenzyme Q10 (COQ-10 PO)  docusate sodium (COLACE) 100 MG capsule  GABAPENTIN PO  GABAPENTIN PO  HYDROcodone-acetaminophen (NORCO) 5-325 MG tablet  hydroxychloroquine (PLAQUENIL) 200 MG tablet  hydrOXYzine (ATARAX) 25 MG tablet  insulin degludec (TRESIBA) 100 UNIT/ML pen  magnesium oxide 200 MG TABS  METOPROLOL TARTRATE PO  nitroFURantoin macrocrystal-monohydrate (MACROBID) 100 MG capsule  PANTOPRAZOLE SODIUM PO  phenazopyridine (PYRIDIUM) 200 MG tablet  SIMVASTATIN PO  sucralfate (CARAFATE) 1 GM tablet          Review of Systems   Constitutional: Negative.    HENT: Negative.    Eyes: Negative.    Respiratory: Negative.    Cardiovascular: Negative.    Gastrointestinal: Negative.    Endocrine: Negative.    Genitourinary: Positive for difficulty urinating and dysuria.   Musculoskeletal: Negative.    Neurological: Negative.    All other systems reviewed and found unremarkable    Physical Exam   BP: 166/82  Pulse: 85  Temp: 97.3  F (36.3  C)  Resp: 16  SpO2: 97 %      Physical Exam 77-year-old female who is awake alert oriented person place and time very  pleasant and cooperative with my exam.  HEENT normocephalic extraocular muscles intact pupils equally round and active light and oropharynx is clear.  Neck is supple is full range of motion without pain.  Lungs are clear bilaterally.  Heart maintains regular rate and rhythm no murmur.  Abdomen is soft no rebound or involuntary guarding.  No flank tenderness.  Extremities are full range of motion 5/5 strength no edema.  Neurologic exam no focal deficit.  Dermatologic exam no diffuse skin rashes or lesions.    ED Course              ED Course as of 06/07/23 0744   Wed Jun 07, 2023   0741 The patient remained very stable throughout her stay in the department.  I will prescribe Macrobid and Pyridium.  I will advise the patient be rechecked by her primary care provider in the next 5 to 7 days.                         Results for orders placed or performed during the hospital encounter of 06/07/23 (from the past 24 hour(s))   UA with Microscopic reflex to Culture    Specimen: Urine, NOS   Result Value Ref Range    Color Urine Light Yellow Colorless, Straw, Light Yellow, Yellow    Appearance Urine Slightly Cloudy (A) Clear    Glucose Urine Negative Negative mg/dL    Bilirubin Urine Negative Negative    Ketones Urine Negative Negative mg/dL    Specific Gravity Urine 1.015 1.003 - 1.035    Blood Urine Large (A) Negative    pH Urine 6.0 4.7 - 8.0    Protein Albumin Urine 100 (A) Negative mg/dL    Urobilinogen Urine Normal Normal, 2.0 mg/dL    Nitrite Urine Negative Negative    Leukocyte Esterase Urine Large (A) Negative    Bacteria Urine Few (A) None Seen /HPF    Mucus Urine Present (A) None Seen /LPF    RBC Urine >182 (H) <=2 /HPF    WBC Urine 160 (H) <=5 /HPF    Squamous Epithelials Urine 0 <=1 /HPF    Transitional Epithelials Urine 1 <=1 /HPF    Narrative    Urine Culture ordered based on laboratory criteria       Medications - No data to display    Assessments & Plan (with Medical Decision Making)     I have reviewed the  nursing notes.    I have reviewed the findings, diagnosis, plan and need for follow up with the patient.      Medical Decision Making  The patient's presentation was of moderate complexity (an acute illness with systemic symptoms).    The patient's evaluation involved:  ordering and/or review of 1 test(s) in this encounter (see separate area of note for details)    The patient's management necessitated moderate risk (prescription drug management including medications given in the ED).        New Prescriptions    NITROFURANTOIN MACROCRYSTAL-MONOHYDRATE (MACROBID) 100 MG CAPSULE    Take 1 capsule (100 mg) by mouth 2 times daily for 5 days    PHENAZOPYRIDINE (PYRIDIUM) 200 MG TABLET    Take 1 tablet (200 mg) by mouth 3 times daily for 3 days       Final diagnoses:   Acute cystitis without hematuria       6/7/2023   [facility]     Behzad Isaacs,   06/07/23 07

## 2023-06-09 LAB — BACTERIA UR CULT: ABNORMAL

## 2023-10-26 ENCOUNTER — MEDICAL CORRESPONDENCE (OUTPATIENT)
Dept: INTERVENTIONAL RADIOLOGY/VASCULAR | Facility: HOSPITAL | Age: 78
End: 2023-10-26

## 2023-11-02 ENCOUNTER — HOSPITAL ENCOUNTER (OUTPATIENT)
Dept: GENERAL RADIOLOGY | Facility: HOSPITAL | Age: 78
Discharge: HOME | End: 2023-11-02
Attending: FAMILY MEDICINE | Admitting: FAMILY MEDICINE
Payer: MEDICARE

## 2023-11-02 DIAGNOSIS — R10.9 UNSPECIFIED ABDOMINAL PAIN: ICD-10-CM

## 2023-11-02 PROCEDURE — 74248 X-RAY SM INT F-THRU STD: CPT

## 2024-02-21 ENCOUNTER — HOSPITAL ENCOUNTER (EMERGENCY)
Facility: HOSPITAL | Age: 79
Discharge: HOME | End: 2024-02-21
Attending: EMERGENCY MEDICINE | Admitting: EMERGENCY MEDICINE
Payer: MEDICARE

## 2024-02-21 ENCOUNTER — APPOINTMENT (OUTPATIENT)
Dept: GENERAL RADIOLOGY | Facility: HOSPITAL | Age: 79
End: 2024-02-21
Attending: EMERGENCY MEDICINE
Payer: MEDICARE

## 2024-02-21 ENCOUNTER — APPOINTMENT (OUTPATIENT)
Dept: ULTRASOUND IMAGING | Facility: HOSPITAL | Age: 79
End: 2024-02-21
Attending: EMERGENCY MEDICINE
Payer: MEDICARE

## 2024-02-21 VITALS
HEIGHT: 67 IN | DIASTOLIC BLOOD PRESSURE: 91 MMHG | OXYGEN SATURATION: 95 % | TEMPERATURE: 97.1 F | HEART RATE: 78 BPM | BODY MASS INDEX: 23.41 KG/M2 | SYSTOLIC BLOOD PRESSURE: 173 MMHG | WEIGHT: 149.14 LBS | RESPIRATION RATE: 16 BRPM

## 2024-02-21 DIAGNOSIS — R07.9 CHEST PAIN, UNSPECIFIED TYPE: ICD-10-CM

## 2024-02-21 LAB
ALBUMIN SERPL BCG-MCNC: 4.2 G/DL (ref 3.5–5.2)
ALP SERPL-CCNC: 82 U/L (ref 40–150)
ALT SERPL W P-5'-P-CCNC: 17 U/L (ref 0–50)
ANION GAP SERPL CALCULATED.3IONS-SCNC: 12 MMOL/L (ref 7–15)
AST SERPL W P-5'-P-CCNC: 22 U/L (ref 0–45)
ATRIAL RATE - MUSE: 83 BPM
BASOPHILS # BLD AUTO: 0 10E3/UL (ref 0–0.2)
BASOPHILS NFR BLD AUTO: 1 %
BILIRUB DIRECT SERPL-MCNC: <0.2 MG/DL (ref 0–0.3)
BILIRUB SERPL-MCNC: 0.3 MG/DL
BUN SERPL-MCNC: 15.1 MG/DL (ref 8–23)
CALCIUM SERPL-MCNC: 9.4 MG/DL (ref 8.8–10.2)
CHLORIDE SERPL-SCNC: 102 MMOL/L (ref 98–107)
CREAT SERPL-MCNC: 1.06 MG/DL (ref 0.51–0.95)
DEPRECATED HCO3 PLAS-SCNC: 26 MMOL/L (ref 22–29)
DIASTOLIC BLOOD PRESSURE - MUSE: NORMAL MMHG
EGFRCR SERPLBLD CKD-EPI 2021: 54 ML/MIN/1.73M2
EOSINOPHIL # BLD AUTO: 0.2 10E3/UL (ref 0–0.7)
EOSINOPHIL NFR BLD AUTO: 3 %
ERYTHROCYTE [DISTWIDTH] IN BLOOD BY AUTOMATED COUNT: 14.6 % (ref 10–15)
GLUCOSE SERPL-MCNC: 112 MG/DL (ref 70–99)
HCT VFR BLD AUTO: 40 % (ref 35–47)
HGB BLD-MCNC: 12.8 G/DL (ref 11.7–15.7)
HOLD SPECIMEN: NORMAL
IMM GRANULOCYTES # BLD: 0 10E3/UL
IMM GRANULOCYTES NFR BLD: 1 %
INTERPRETATION ECG - MUSE: NORMAL
LIPASE SERPL-CCNC: 29 U/L (ref 13–60)
LYMPHOCYTES # BLD AUTO: 1.6 10E3/UL (ref 0.8–5.3)
LYMPHOCYTES NFR BLD AUTO: 20 %
MCH RBC QN AUTO: 24.8 PG (ref 26.5–33)
MCHC RBC AUTO-ENTMCNC: 32 G/DL (ref 31.5–36.5)
MCV RBC AUTO: 78 FL (ref 78–100)
MONOCYTES # BLD AUTO: 0.8 10E3/UL (ref 0–1.3)
MONOCYTES NFR BLD AUTO: 10 %
NEUTROPHILS # BLD AUTO: 5.2 10E3/UL (ref 1.6–8.3)
NEUTROPHILS NFR BLD AUTO: 65 %
NRBC # BLD AUTO: 0 10E3/UL
NRBC BLD AUTO-RTO: 0 /100
P AXIS - MUSE: 74 DEGREES
PLATELET # BLD AUTO: 197 10E3/UL (ref 150–450)
POTASSIUM SERPL-SCNC: 4 MMOL/L (ref 3.4–5.3)
PR INTERVAL - MUSE: 150 MS
PROT SERPL-MCNC: 7.6 G/DL (ref 6.4–8.3)
QRS DURATION - MUSE: 86 MS
QT - MUSE: 388 MS
QTC - MUSE: 455 MS
R AXIS - MUSE: -20 DEGREES
RBC # BLD AUTO: 5.16 10E6/UL (ref 3.8–5.2)
SODIUM SERPL-SCNC: 140 MMOL/L (ref 135–145)
SYSTOLIC BLOOD PRESSURE - MUSE: NORMAL MMHG
T AXIS - MUSE: 67 DEGREES
TROPONIN T SERPL HS-MCNC: 11 NG/L
TROPONIN T SERPL HS-MCNC: 9 NG/L
VENTRICULAR RATE- MUSE: 83 BPM
WBC # BLD AUTO: 7.9 10E3/UL (ref 4–11)

## 2024-02-21 PROCEDURE — 250N000013 HC RX MED GY IP 250 OP 250 PS 637: Performed by: EMERGENCY MEDICINE

## 2024-02-21 PROCEDURE — 82248 BILIRUBIN DIRECT: CPT | Performed by: EMERGENCY MEDICINE

## 2024-02-21 PROCEDURE — 36415 COLL VENOUS BLD VENIPUNCTURE: CPT | Performed by: EMERGENCY MEDICINE

## 2024-02-21 PROCEDURE — 76705 ECHO EXAM OF ABDOMEN: CPT

## 2024-02-21 PROCEDURE — 99285 EMERGENCY DEPT VISIT HI MDM: CPT | Mod: 25

## 2024-02-21 PROCEDURE — 84484 ASSAY OF TROPONIN QUANT: CPT | Mod: 91 | Performed by: EMERGENCY MEDICINE

## 2024-02-21 PROCEDURE — 250N000009 HC RX 250: Performed by: EMERGENCY MEDICINE

## 2024-02-21 PROCEDURE — 85025 COMPLETE CBC W/AUTO DIFF WBC: CPT | Performed by: EMERGENCY MEDICINE

## 2024-02-21 PROCEDURE — 99284 EMERGENCY DEPT VISIT MOD MDM: CPT | Performed by: EMERGENCY MEDICINE

## 2024-02-21 PROCEDURE — 93005 ELECTROCARDIOGRAM TRACING: CPT

## 2024-02-21 PROCEDURE — 93010 ELECTROCARDIOGRAM REPORT: CPT | Performed by: INTERNAL MEDICINE

## 2024-02-21 PROCEDURE — 71046 X-RAY EXAM CHEST 2 VIEWS: CPT

## 2024-02-21 PROCEDURE — 83690 ASSAY OF LIPASE: CPT | Performed by: EMERGENCY MEDICINE

## 2024-02-21 PROCEDURE — 80048 BASIC METABOLIC PNL TOTAL CA: CPT | Performed by: EMERGENCY MEDICINE

## 2024-02-21 RX ORDER — MAGNESIUM HYDROXIDE/ALUMINUM HYDROXICE/SIMETHICONE 120; 1200; 1200 MG/30ML; MG/30ML; MG/30ML
15 SUSPENSION ORAL ONCE
Status: COMPLETED | OUTPATIENT
Start: 2024-02-21 | End: 2024-02-21

## 2024-02-21 RX ORDER — LIDOCAINE HYDROCHLORIDE 20 MG/ML
5 SOLUTION OROPHARYNGEAL ONCE
Status: COMPLETED | OUTPATIENT
Start: 2024-02-21 | End: 2024-02-21

## 2024-02-21 RX ORDER — HYDROXYZINE PAMOATE 25 MG/1
25 CAPSULE ORAL AT BEDTIME
COMMUNITY
Start: 2024-01-14 | End: 2024-09-11

## 2024-02-21 RX ADMIN — ALUMINUM HYDROXIDE, MAGNESIUM HYDROXIDE, AND SIMETHICONE 15 ML: 200; 200; 20 SUSPENSION ORAL at 10:37

## 2024-02-21 RX ADMIN — LIDOCAINE HYDROCHLORIDE 5 ML: 20 SOLUTION ORAL at 10:37

## 2024-02-21 ASSESSMENT — COLUMBIA-SUICIDE SEVERITY RATING SCALE - C-SSRS
1. IN THE PAST MONTH, HAVE YOU WISHED YOU WERE DEAD OR WISHED YOU COULD GO TO SLEEP AND NOT WAKE UP?: NO
2. HAVE YOU ACTUALLY HAD ANY THOUGHTS OF KILLING YOURSELF IN THE PAST MONTH?: NO
6. HAVE YOU EVER DONE ANYTHING, STARTED TO DO ANYTHING, OR PREPARED TO DO ANYTHING TO END YOUR LIFE?: NO

## 2024-02-21 ASSESSMENT — ACTIVITIES OF DAILY LIVING (ADL): ADLS_ACUITY_SCORE: 39

## 2024-02-21 NOTE — DISCHARGE INSTRUCTIONS
Return for any new or concerning symptoms.  Follow-up with Dr. iVllela regarding a stress test as discussed.

## 2024-02-21 NOTE — ED TRIAGE NOTES
Pt presents with chest pains and goes to her back.  Onset was early this morning. No cardiac hx.  Left neck pain and jaw more achy then pain

## 2024-02-21 NOTE — ED PROVIDER NOTES
History     Chief Complaint   Patient presents with    Chest Pain     HPI  Cee Pereira is a 78 year old female who presents with chest pain onset earlier today, now resolved, unsure what it is, no history of similar.  No radiation.    Allergies:  Allergies   Allergen Reactions    Diatrizoate      Other reaction(s): *Unknown    Iodides      Other reaction(s): *Unknown  Tincture    Ivp Dye [Contrast Dye]      Anaphylaxis    Lortab [Hydrocodone-Acetaminophen] Itching     headaches    No Clinical Screening - See Comments Itching    Pioglitazone Itching       Problem List:    Patient Active Problem List    Diagnosis Date Noted    Intra-abdominal abscess (H) 09/05/2019     Priority: Medium    LUQ abdominal pain 09/18/2018     Priority: Medium    GERD (gastroesophageal reflux disease) 09/18/2018     Priority: Medium    HTN (hypertension) 09/18/2018     Priority: Medium    Hyperlipidemia 09/18/2018     Priority: Medium    Viral gastroenteritis 05/23/2017     Priority: Medium    Dehydration 05/23/2017     Priority: Medium    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) 05/23/2017     Priority: Medium    Crohn's disease of large intestine without complication (H) 05/23/2017     Priority: Medium    Gastroenteritis 05/23/2017     Priority: Medium    Small bowel obstruction (H) 08/17/2016     Priority: Medium    SBO (small bowel obstruction) (H) 08/17/2016     Priority: Medium    Lumbar stenosis 07/30/2013     Priority: Medium    Lumbar disc herniation with radiculopathy 08/03/2012     Priority: Medium        Past Medical History:    Past Medical History:   Diagnosis Date    Diabetes mellitus (H)     Diverticulitis     Herniated lumbar intervertebral disc        Past Surgical History:    Past Surgical History:   Procedure Laterality Date    APPENDECTOMY OPEN      CHOLECYSTECTOMY      COLECTOMY WITHOUT COLOSTOMY      for diverticulitis    DECOMPRESSION LUMBAR ONE LEVEL  7/31/2013    L4-L5    ESOPHAGOSCOPY,  "GASTROSCOPY, DUODENOSCOPY (EGD), COMBINED N/A 9/6/2019    Procedure: UPPER ENDOSCOPY WITH BIOPSY;  Surgeon: Froy Barraza MD;  Location: [facility]    JOINT REPLACEMENT      bilateral hip replacements       Family History:    No family history on file.    Social History:  Marital Status:   [2]  Social History     Tobacco Use    Smoking status: Former    Smokeless tobacco: Never   Substance Use Topics    Alcohol use: No        Medications:    acetaminophen (TYLENOL) 325 MG tablet  aspirin-dipyridamole (AGGRENOX)  MG per 12 hr capsule  Coenzyme Q10 (COQ-10 PO)  docusate sodium (COLACE) 100 MG capsule  GABAPENTIN PO  GABAPENTIN PO  hydroxychloroquine (PLAQUENIL) 200 MG tablet  hydrOXYzine (ATARAX) 25 MG tablet  hydrOXYzine romain (VISTARIL) 25 MG capsule  magnesium oxide 200 MG TABS  METOPROLOL TARTRATE PO  PANTOPRAZOLE SODIUM PO  SEMAGLUTIDE,0.25 OR 0.5MG/DOS, SC  SIMVASTATIN PO  sucralfate (CARAFATE) 1 GM tablet  HYDROcodone-acetaminophen (NORCO) 5-325 MG tablet  insulin degludec (TRESIBA) 100 UNIT/ML pen          Review of Systems   per HPI.  GI denies.  Respiratory denies.    Physical Exam   BP: 178/82  Pulse: 87  Temp: 97.1  F (36.2  C)  Resp: 16  Height: 170.2 cm (5' 7\")  Weight: 67.7 kg (149 lb 2.3 oz)  SpO2: 97 %      Physical Exam  Constitutional:       Appearance: She is well-developed.   HENT:      Head: Normocephalic and atraumatic.   Eyes:      Pupils: Pupils are equal, round, and reactive to light.   Cardiovascular:      Rate and Rhythm: Normal rate.   Pulmonary:      Effort: Pulmonary effort is normal. No tachypnea or respiratory distress.      Breath sounds: Normal breath sounds.   Abdominal:      Palpations: Abdomen is soft.   Genitourinary:     Comments: Galdamez catheter in place.  No blood at meatus.  Musculoskeletal:         General: Normal range of motion.      Cervical back: Normal range of motion and neck supple.      Right lower leg: No edema.      Left lower leg: No edema. "   Skin:     General: Skin is warm and dry.      Capillary Refill: Capillary refill takes less than 2 seconds.   Neurological:      Mental Status: She is alert.            Results for orders placed or performed during the hospital encounter of 02/21/24 (from the past 24 hour(s))   EKG 12-lead, tracing only   Result Value Ref Range    Systolic Blood Pressure  mmHg    Diastolic Blood Pressure  mmHg    Ventricular Rate 83 BPM    Atrial Rate 83 BPM    NJ Interval 150 ms    QRS Duration 86 ms     ms    QTc 455 ms    P Axis 74 degrees    R AXIS -20 degrees    T Axis 67 degrees    Interpretation ECG       Sinus rhythm  Normal ECG  No previous ECGs available  Confirmed by MD Batista Anthony ([id number]) on 2/21/2024 11:33:02 AM     [place] Draw    Narrative    The following orders were created for panel order [place] Draw.  Procedure                               Abnormality         Status                     ---------                               -----------         ------                     Extra Blue Top Tube[[id number]]                              Final result               Extra Red Top Tube[[id number]]                               Final result               Extra Green Top (Lithium...[[id number]]                      Final result               Extra Purple Top Tube[[id number]]                            Final result               Extra Green Top (Lithium...[[id number]]                      Final result                 Please view results for these tests on the individual orders.   Extra Blue Top Tube   Result Value Ref Range    Hold Specimen JIC    Extra Red Top Tube   Result Value Ref Range    Hold Specimen JIC    Extra Green Top (Lithium Heparin) Tube   Result Value Ref Range    Hold Specimen JIC    Extra Purple Top Tube   Result Value Ref Range    Hold Specimen JIC    Extra Green Top (Lithium Heparin) ON ICE   Result Value Ref Range    Hold Specimen JIC    CBC with platelets differential    Narrative    The  following orders were created for panel order CBC with platelets differential.  Procedure                               Abnormality         Status                     ---------                               -----------         ------                     CBC with platelets and d...[[id number]]  Abnormal            Final result                 Please view results for these tests on the individual orders.   Basic metabolic panel   Result Value Ref Range    Sodium 140 135 - 145 mmol/L    Potassium 4.0 3.4 - 5.3 mmol/L    Chloride 102 98 - 107 mmol/L    Carbon Dioxide (CO2) 26 22 - 29 mmol/L    Anion Gap 12 7 - 15 mmol/L    Urea Nitrogen 15.1 8.0 - 23.0 mg/dL    Creatinine 1.06 (H) 0.51 - 0.95 mg/dL    GFR Estimate 54 (L) >60 mL/min/1.73m2    Calcium 9.4 8.8 - 10.2 mg/dL    Glucose 112 (H) 70 - 99 mg/dL   Troponin T, High Sensitivity   Result Value Ref Range    Troponin T, High Sensitivity 11 <=14 ng/L   Hepatic panel   Result Value Ref Range    Protein Total 7.6 6.4 - 8.3 g/dL    Albumin 4.2 3.5 - 5.2 g/dL    Bilirubin Total 0.3 <=1.2 mg/dL    Alkaline Phosphatase 82 40 - 150 U/L    AST 22 0 - 45 U/L    ALT 17 0 - 50 U/L    Bilirubin Direct <0.20 0.00 - 0.30 mg/dL   Lipase   Result Value Ref Range    Lipase 29 13 - 60 U/L   CBC with platelets and differential   Result Value Ref Range    WBC Count 7.9 4.0 - 11.0 10e3/uL    RBC Count 5.16 3.80 - 5.20 10e6/uL    Hemoglobin 12.8 11.7 - 15.7 g/dL    Hematocrit 40.0 35.0 - 47.0 %    MCV 78 78 - 100 fL    MCH 24.8 (L) 26.5 - 33.0 pg    MCHC 32.0 31.5 - 36.5 g/dL    RDW 14.6 10.0 - 15.0 %    Platelet Count 197 150 - 450 10e3/uL    % Neutrophils 65 %    % Lymphocytes 20 %    % Monocytes 10 %    % Eosinophils 3 %    % Basophils 1 %    % Immature Granulocytes 1 %    NRBCs per 100 WBC 0 <1 /100    Absolute Neutrophils 5.2 1.6 - 8.3 10e3/uL    Absolute Lymphocytes 1.6 0.8 - 5.3 10e3/uL    Absolute Monocytes 0.8 0.0 - 1.3 10e3/uL    Absolute Eosinophils 0.2 0.0 - 0.7 10e3/uL     Absolute Basophils 0.0 0.0 - 0.2 10e3/uL    Absolute Immature Granulocytes 0.0 <=0.4 10e3/uL    Absolute NRBCs 0.0 10e3/uL   Chest XR,  PA & LAT    Narrative    PROCEDURE: XR CHEST 2 VIEWS 2/21/2024 10:31 AM    HISTORY: cp    COMPARISONS: 8/15/2022.    TECHNIQUE: 3 Views.    FINDINGS: Heart is not enlarged. 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  Lungs are clear. No pleural effusion is seen.    Degenerative changes seen in the spine. There are surgical clips in  the right upper quadrant.         Impression    IMPRESSION: No acute infiltrate.    ELSY UMANZOR MD         SYSTEM ID:  [id number]   Troponin T, High Sensitivity   Result Value Ref Range    Troponin T, High Sensitivity 9 <=14 ng/L   US Abdomen Limited    Narrative    PROCEDURES: US ABDOMEN LIMITED    HISTORY:78 years Female   with right upper quadrant pain..    TECHNIQUE:  Ultrasound of the right upper quadrant was performed.    COMPARISON: None.     FINDINGS:    PANCREAS:The pancreatic head and body are unremarkable.    ABDOMINAL AORTA AND IVC: Unremarkable    LIVER: Echogenicity is uniform. Craniocaudad length is 12.1 cm.    GALLBLADDER: The gallbladder is nonvisualized. The common bile duct is  3 mm at the anne hepatis. There is no biliary dilatation.    Right Kidney: 10.1 cm in length. No hydronephrosis.        Impression    IMPRESSION: Negative study. No evidence of biliary dilatation.     KEYA DRAKE MD         SYSTEM ID:  [id number]       Medications   alum & mag hydroxide-simethicone (MAALOX) suspension 15 mL (15 mLs Oral $Given 2/21/24 1037)   lidocaine (viscous) (XYLOCAINE) 2 % solution 5 mL (5 mLs Mouth/Throat $Given 2/21/24 1037)       Assessments & Plan (with Medical Decision Making)   Patient with just discomfort, evaluation nondiagnostic.  Plan to follow-up with Dr. Villela for consideration of stress test or return if any new or concerning symptoms.  Patient agrees with plan.  Spoke to  as well.  Discharge Medication List as of  2/21/2024 12:13 PM          Final diagnoses:   Chest pain, unspecified type       2/21/2024   [facility]       Ed Rob MD  02/21/24 0079

## 2024-02-21 NOTE — ED NOTES
Pt given discharge instructions, pt verbalized understanding. No questions or concerns at time of discharge. All personal belongings sent home with pt

## 2024-09-11 ENCOUNTER — APPOINTMENT (OUTPATIENT)
Dept: CT IMAGING | Facility: HOSPITAL | Age: 79
End: 2024-09-11
Attending: STUDENT IN AN ORGANIZED HEALTH CARE EDUCATION/TRAINING PROGRAM
Payer: MEDICARE

## 2024-09-11 ENCOUNTER — HOSPITAL ENCOUNTER (EMERGENCY)
Facility: HOSPITAL | Age: 79
Discharge: HOME | End: 2024-09-11
Attending: STUDENT IN AN ORGANIZED HEALTH CARE EDUCATION/TRAINING PROGRAM | Admitting: STUDENT IN AN ORGANIZED HEALTH CARE EDUCATION/TRAINING PROGRAM
Payer: MEDICARE

## 2024-09-11 VITALS
TEMPERATURE: 97.2 F | RESPIRATION RATE: 16 BRPM | OXYGEN SATURATION: 97 % | SYSTOLIC BLOOD PRESSURE: 177 MMHG | DIASTOLIC BLOOD PRESSURE: 96 MMHG | WEIGHT: 148.92 LBS | BODY MASS INDEX: 23.32 KG/M2 | HEART RATE: 73 BPM

## 2024-09-11 DIAGNOSIS — R10.12 LEFT UPPER QUADRANT ABDOMINAL PAIN: ICD-10-CM

## 2024-09-11 LAB
ALBUMIN SERPL BCG-MCNC: 4.1 G/DL (ref 3.5–5.2)
ALBUMIN UR-MCNC: NEGATIVE MG/DL
ALP SERPL-CCNC: 80 U/L (ref 40–150)
ALT SERPL W P-5'-P-CCNC: 16 U/L (ref 0–50)
ANION GAP SERPL CALCULATED.3IONS-SCNC: 10 MMOL/L (ref 7–15)
APPEARANCE UR: CLEAR
AST SERPL W P-5'-P-CCNC: 20 U/L (ref 0–45)
BASOPHILS # BLD AUTO: 0.1 10E3/UL (ref 0–0.2)
BASOPHILS NFR BLD AUTO: 1 %
BILIRUB SERPL-MCNC: 0.3 MG/DL
BILIRUB UR QL STRIP: NEGATIVE
BUN SERPL-MCNC: 17.7 MG/DL (ref 8–23)
CALCIUM SERPL-MCNC: 9.6 MG/DL (ref 8.8–10.4)
CHLORIDE SERPL-SCNC: 103 MMOL/L (ref 98–107)
COLOR UR AUTO: NORMAL
CREAT SERPL-MCNC: 1.14 MG/DL (ref 0.51–0.95)
EGFRCR SERPLBLD CKD-EPI 2021: 49 ML/MIN/1.73M2
EOSINOPHIL # BLD AUTO: 0.2 10E3/UL (ref 0–0.7)
EOSINOPHIL NFR BLD AUTO: 4 %
ERYTHROCYTE [DISTWIDTH] IN BLOOD BY AUTOMATED COUNT: 14.7 % (ref 10–15)
GLUCOSE SERPL-MCNC: 102 MG/DL (ref 70–99)
GLUCOSE UR STRIP-MCNC: NEGATIVE MG/DL
HCO3 SERPL-SCNC: 27 MMOL/L (ref 22–29)
HCT VFR BLD AUTO: 41.1 % (ref 35–47)
HGB BLD-MCNC: 13.2 G/DL (ref 11.7–15.7)
HGB UR QL STRIP: NEGATIVE
HOLD SPECIMEN: NORMAL
IMM GRANULOCYTES # BLD: 0 10E3/UL
IMM GRANULOCYTES NFR BLD: 0 %
KETONES UR STRIP-MCNC: NEGATIVE MG/DL
LEUKOCYTE ESTERASE UR QL STRIP: NEGATIVE
LIPASE SERPL-CCNC: 31 U/L (ref 13–60)
LYMPHOCYTES # BLD AUTO: 1.7 10E3/UL (ref 0.8–5.3)
LYMPHOCYTES NFR BLD AUTO: 29 %
MCH RBC QN AUTO: 25.5 PG (ref 26.5–33)
MCHC RBC AUTO-ENTMCNC: 32.1 G/DL (ref 31.5–36.5)
MCV RBC AUTO: 80 FL (ref 78–100)
MONOCYTES # BLD AUTO: 0.6 10E3/UL (ref 0–1.3)
MONOCYTES NFR BLD AUTO: 11 %
NEUTROPHILS # BLD AUTO: 3.2 10E3/UL (ref 1.6–8.3)
NEUTROPHILS NFR BLD AUTO: 55 %
NITRATE UR QL: NEGATIVE
NRBC # BLD AUTO: 0 10E3/UL
NRBC BLD AUTO-RTO: 0 /100
PH UR STRIP: 7 [PH] (ref 4.7–8)
PLATELET # BLD AUTO: 177 10E3/UL (ref 150–450)
POTASSIUM SERPL-SCNC: 4.3 MMOL/L (ref 3.4–5.3)
PROT SERPL-MCNC: 7.1 G/DL (ref 6.4–8.3)
RBC # BLD AUTO: 5.17 10E6/UL (ref 3.8–5.2)
RBC URINE: 0 /HPF
SODIUM SERPL-SCNC: 140 MMOL/L (ref 135–145)
SP GR UR STRIP: 1.02 (ref 1–1.03)
SQUAMOUS EPITHELIAL: 0 /HPF
UROBILINOGEN UR STRIP-MCNC: NORMAL MG/DL
WBC # BLD AUTO: 5.8 10E3/UL (ref 4–11)
WBC URINE: <1 /HPF

## 2024-09-11 PROCEDURE — 81001 URINALYSIS AUTO W/SCOPE: CPT | Performed by: STUDENT IN AN ORGANIZED HEALTH CARE EDUCATION/TRAINING PROGRAM

## 2024-09-11 PROCEDURE — 83690 ASSAY OF LIPASE: CPT | Performed by: STUDENT IN AN ORGANIZED HEALTH CARE EDUCATION/TRAINING PROGRAM

## 2024-09-11 PROCEDURE — 36415 COLL VENOUS BLD VENIPUNCTURE: CPT | Performed by: STUDENT IN AN ORGANIZED HEALTH CARE EDUCATION/TRAINING PROGRAM

## 2024-09-11 PROCEDURE — 99284 EMERGENCY DEPT VISIT MOD MDM: CPT | Performed by: STUDENT IN AN ORGANIZED HEALTH CARE EDUCATION/TRAINING PROGRAM

## 2024-09-11 PROCEDURE — 80053 COMPREHEN METABOLIC PANEL: CPT | Performed by: STUDENT IN AN ORGANIZED HEALTH CARE EDUCATION/TRAINING PROGRAM

## 2024-09-11 PROCEDURE — G1010 CDSM STANSON: HCPCS

## 2024-09-11 PROCEDURE — 85025 COMPLETE CBC W/AUTO DIFF WBC: CPT | Performed by: STUDENT IN AN ORGANIZED HEALTH CARE EDUCATION/TRAINING PROGRAM

## 2024-09-11 PROCEDURE — 74176 CT ABD & PELVIS W/O CONTRAST: CPT | Mod: MG

## 2024-09-11 PROCEDURE — 99284 EMERGENCY DEPT VISIT MOD MDM: CPT | Mod: 25

## 2024-09-11 ASSESSMENT — ACTIVITIES OF DAILY LIVING (ADL)
ADLS_ACUITY_SCORE: 39
ADLS_ACUITY_SCORE: 39

## 2024-09-11 ASSESSMENT — COLUMBIA-SUICIDE SEVERITY RATING SCALE - C-SSRS
2. HAVE YOU ACTUALLY HAD ANY THOUGHTS OF KILLING YOURSELF IN THE PAST MONTH?: NO
1. IN THE PAST MONTH, HAVE YOU WISHED YOU WERE DEAD OR WISHED YOU COULD GO TO SLEEP AND NOT WAKE UP?: NO
6. HAVE YOU EVER DONE ANYTHING, STARTED TO DO ANYTHING, OR PREPARED TO DO ANYTHING TO END YOUR LIFE?: NO

## 2024-09-11 NOTE — ED PROVIDER NOTES
[facility]  ED Provider Note    Chief Complaint   Patient presents with    Abdominal Pain     History:  Cee Pereira is a 78 year old female with with a history of multiple abdominal surgeries, SBO presents to the emergency department today with left-sided abdominal pain that wraps around to the back.  No urinary symptoms.  No fevers.  No nausea or vomiting.  Her stool output is reduced but she does continue to be able to stool.  Symptoms have been present for the last week and fairly constant.  There is worsening of the symptoms overall.  No other complaints    Review of Systems   Performed; see HPI for pertinent positives and negatives.     Medical history, surgical history, and social history was reviewed.  Nursing documentation, triage note, and vitals were reviewed.    Vitals:  BP: 167/87  Pulse: 75  Temp: 97.2  F (36.2  C)  Resp: 16  Weight: 67.5 kg (148 lb 14.7 oz)  SpO2: 96 %    Physical Exam:  Constitutional: Alert and conversant. NAD   HENT: NCAT   Eyes: Normal pupils   Neck: supple   CV: No pallor  Pulmonary/Chest: Non-labored respirations  Abdominal: non-distended no rebound no guardingMild left upper quadrant tenderness to palpation  MSK: JACQUES.   Neuro: Alert and appropriate   Skin: Warm and dry. No diaphoresis. No rashes on exposed skin    Psych: Appropriate mood and affect       MDM:      ED Course as of 09/11/24 1504   Wed Sep 11, 2024   1229 Differential includes but is not limited to appendicitis, cholecystitis, pancreatitis, gastroesophageal reflux disease, gastritis, nephrolithiasis, pyelonephritis, urinary tract infection, ovarian cyst, ovarian torsion, ectopic pregnancy, mesenteric ischemia, strangulated hernia, aortic aneurysm.   Given her hx of abdominal surgeries, high risk for SBO and she says that it feels in the same ballpark based on the quality of pain I think reasonable for CT scan.  She does not tolerate contrast however has an anaphylactic reaction listed we will  hold off on contrast at this time   1230 Laboratory evaluation largely unremarkable.  No concerning findings   1230 CT Abdomen Pelvis w/o Contrast  Postoperative changes without findings of small bowel obstruction. No  acute abnormality.     Disc osteophyte complex at L3-4 results in moderate to severe spinal  canal narrowing at this level, possibly progressed since comparison  3/29/2002, consider short-term follow-up lumbar marked better  characterize.  Patient states she just had an MRI about a month ago, is following up with pain management specialists for it.  Recommend to continue her plan       Procedures:  Procedures        Impression:  Final diagnoses:   Left upper quadrant abdominal pain            Grupo Mays MD  09/11/24 9710

## 2024-09-11 NOTE — ED TRIAGE NOTES
Pt presents with c/o left sided abd pain that has been present for the past week and has gotten progressively worse. Pt has a hx of SBO and 11 abd surgeries.

## 2024-09-11 NOTE — DISCHARGE INSTRUCTIONS
Follow-up with a back pain specialist and your primary care provider.  Call to schedule an appointment for ongoing symptoms.  Return to the emergency department for worsening symptoms or new concerning symptoms

## 2024-11-17 ENCOUNTER — HOSPITAL ENCOUNTER (EMERGENCY)
Facility: HOSPITAL | Age: 79
Discharge: HOME | End: 2024-11-17
Attending: EMERGENCY MEDICINE
Payer: MEDICARE

## 2024-11-17 ENCOUNTER — APPOINTMENT (OUTPATIENT)
Dept: CT IMAGING | Facility: HOSPITAL | Age: 79
End: 2024-11-17
Attending: EMERGENCY MEDICINE
Payer: MEDICARE

## 2024-11-17 VITALS
OXYGEN SATURATION: 95 % | HEART RATE: 85 BPM | DIASTOLIC BLOOD PRESSURE: 84 MMHG | RESPIRATION RATE: 18 BRPM | SYSTOLIC BLOOD PRESSURE: 163 MMHG | TEMPERATURE: 97.6 F

## 2024-11-17 DIAGNOSIS — R51.9 ACUTE NONINTRACTABLE HEADACHE, UNSPECIFIED HEADACHE TYPE: ICD-10-CM

## 2024-11-17 LAB
ABO/RH(D): NORMAL
ALBUMIN SERPL BCG-MCNC: 4.1 G/DL (ref 3.5–5.2)
ALP SERPL-CCNC: 79 U/L (ref 40–150)
ALT SERPL W P-5'-P-CCNC: 15 U/L (ref 0–50)
ANION GAP SERPL CALCULATED.3IONS-SCNC: 13 MMOL/L (ref 7–15)
ANTIBODY SCREEN: NEGATIVE
AST SERPL W P-5'-P-CCNC: 20 U/L (ref 0–45)
BASOPHILS # BLD AUTO: 0 10E3/UL (ref 0–0.2)
BASOPHILS NFR BLD AUTO: 0 %
BILIRUB SERPL-MCNC: 0.3 MG/DL
BUN SERPL-MCNC: 19.7 MG/DL (ref 8–23)
CALCIUM SERPL-MCNC: 9.3 MG/DL (ref 8.8–10.4)
CHLORIDE SERPL-SCNC: 99 MMOL/L (ref 98–107)
CREAT SERPL-MCNC: 0.84 MG/DL (ref 0.51–0.95)
EGFRCR SERPLBLD CKD-EPI 2021: 71 ML/MIN/1.73M2
EOSINOPHIL # BLD AUTO: 0.1 10E3/UL (ref 0–0.7)
EOSINOPHIL NFR BLD AUTO: 2 %
ERYTHROCYTE [DISTWIDTH] IN BLOOD BY AUTOMATED COUNT: 14.6 % (ref 10–15)
GLUCOSE SERPL-MCNC: 114 MG/DL (ref 70–99)
HCO3 SERPL-SCNC: 22 MMOL/L (ref 22–29)
HCT VFR BLD AUTO: 38.6 % (ref 35–47)
HGB BLD-MCNC: 12.7 G/DL (ref 11.7–15.7)
IMM GRANULOCYTES # BLD: 0 10E3/UL
IMM GRANULOCYTES NFR BLD: 0 %
INR PPP: 1.02 (ref 0.85–1.15)
LYMPHOCYTES # BLD AUTO: 1.5 10E3/UL (ref 0.8–5.3)
LYMPHOCYTES NFR BLD AUTO: 20 %
MCH RBC QN AUTO: 25.2 PG (ref 26.5–33)
MCHC RBC AUTO-ENTMCNC: 32.9 G/DL (ref 31.5–36.5)
MCV RBC AUTO: 77 FL (ref 78–100)
MONOCYTES # BLD AUTO: 0.7 10E3/UL (ref 0–1.3)
MONOCYTES NFR BLD AUTO: 9 %
NEUTROPHILS # BLD AUTO: 5.1 10E3/UL (ref 1.6–8.3)
NEUTROPHILS NFR BLD AUTO: 69 %
NRBC # BLD AUTO: 0 10E3/UL
NRBC BLD AUTO-RTO: 0 /100
PLATELET # BLD AUTO: 175 10E3/UL (ref 150–450)
POTASSIUM SERPL-SCNC: 3.7 MMOL/L (ref 3.4–5.3)
PROT SERPL-MCNC: 7 G/DL (ref 6.4–8.3)
RBC # BLD AUTO: 5.04 10E6/UL (ref 3.8–5.2)
SODIUM SERPL-SCNC: 134 MMOL/L (ref 135–145)
SPECIMEN EXPIRATION DATE: NORMAL
WBC # BLD AUTO: 7.5 10E3/UL (ref 4–11)

## 2024-11-17 PROCEDURE — 96374 THER/PROPH/DIAG INJ IV PUSH: CPT

## 2024-11-17 PROCEDURE — 96375 TX/PRO/DX INJ NEW DRUG ADDON: CPT

## 2024-11-17 PROCEDURE — 250N000011 HC RX IP 250 OP 636: Performed by: EMERGENCY MEDICINE

## 2024-11-17 PROCEDURE — 99285 EMERGENCY DEPT VISIT HI MDM: CPT | Mod: 25

## 2024-11-17 PROCEDURE — 80053 COMPREHEN METABOLIC PANEL: CPT | Performed by: EMERGENCY MEDICINE

## 2024-11-17 PROCEDURE — 36415 COLL VENOUS BLD VENIPUNCTURE: CPT | Performed by: EMERGENCY MEDICINE

## 2024-11-17 PROCEDURE — 70450 CT HEAD/BRAIN W/O DYE: CPT | Mod: MF

## 2024-11-17 PROCEDURE — 85004 AUTOMATED DIFF WBC COUNT: CPT | Performed by: EMERGENCY MEDICINE

## 2024-11-17 PROCEDURE — 99284 EMERGENCY DEPT VISIT MOD MDM: CPT | Performed by: EMERGENCY MEDICINE

## 2024-11-17 PROCEDURE — G1010 CDSM STANSON: HCPCS

## 2024-11-17 PROCEDURE — 85610 PROTHROMBIN TIME: CPT | Performed by: EMERGENCY MEDICINE

## 2024-11-17 PROCEDURE — 86850 RBC ANTIBODY SCREEN: CPT | Performed by: EMERGENCY MEDICINE

## 2024-11-17 PROCEDURE — 86900 BLOOD TYPING SEROLOGIC ABO: CPT | Performed by: EMERGENCY MEDICINE

## 2024-11-17 PROCEDURE — 85048 AUTOMATED LEUKOCYTE COUNT: CPT | Performed by: EMERGENCY MEDICINE

## 2024-11-17 RX ORDER — METOCLOPRAMIDE HYDROCHLORIDE 5 MG/ML
10 INJECTION INTRAMUSCULAR; INTRAVENOUS ONCE
Status: COMPLETED | OUTPATIENT
Start: 2024-11-17 | End: 2024-11-17

## 2024-11-17 RX ORDER — KETOROLAC TROMETHAMINE 15 MG/ML
15 INJECTION, SOLUTION INTRAMUSCULAR; INTRAVENOUS ONCE
Status: COMPLETED | OUTPATIENT
Start: 2024-11-17 | End: 2024-11-17

## 2024-11-17 RX ADMIN — METOCLOPRAMIDE HYDROCHLORIDE 10 MG: 5 INJECTION INTRAMUSCULAR; INTRAVENOUS at 05:33

## 2024-11-17 RX ADMIN — KETOROLAC TROMETHAMINE 15 MG: 15 INJECTION, SOLUTION INTRAMUSCULAR; INTRAVENOUS at 06:01

## 2024-11-17 ASSESSMENT — ACTIVITIES OF DAILY LIVING (ADL)
ADLS_ACUITY_SCORE: 0
ADLS_ACUITY_SCORE: 0

## 2024-11-17 ASSESSMENT — COLUMBIA-SUICIDE SEVERITY RATING SCALE - C-SSRS
6. HAVE YOU EVER DONE ANYTHING, STARTED TO DO ANYTHING, OR PREPARED TO DO ANYTHING TO END YOUR LIFE?: NO
2. HAVE YOU ACTUALLY HAD ANY THOUGHTS OF KILLING YOURSELF IN THE PAST MONTH?: NO
1. IN THE PAST MONTH, HAVE YOU WISHED YOU WERE DEAD OR WISHED YOU COULD GO TO SLEEP AND NOT WAKE UP?: NO

## 2024-11-17 NOTE — ED TRIAGE NOTES
Headache that has been going on for a couple of days but reports much worse at night pt reports feeling nauseous.   Back stimulator placed 3 weeks ago

## 2024-11-17 NOTE — ED NOTES
Provider at bedside with nurse. Patient states that her headache has krish progressively worse tonight over the last few hours. Denies new medications, denies head trauma. Patient states she is nauseous, but denies vomiting. Denies history of stroke. States she is on aggronox.     Denies cardiac history, but states she is being treated for high BP. Reports hx of ovarian cysts and states her LLQ has had pain since last night that feels similar to previous cysts.

## 2024-11-18 ENCOUNTER — APPOINTMENT (OUTPATIENT)
Dept: CT IMAGING | Facility: HOSPITAL | Age: 79
End: 2024-11-18
Attending: STUDENT IN AN ORGANIZED HEALTH CARE EDUCATION/TRAINING PROGRAM
Payer: MEDICARE

## 2024-11-18 ENCOUNTER — HOSPITAL ENCOUNTER (EMERGENCY)
Facility: HOSPITAL | Age: 79
Discharge: HOME | End: 2024-11-19
Attending: STUDENT IN AN ORGANIZED HEALTH CARE EDUCATION/TRAINING PROGRAM
Payer: MEDICARE

## 2024-11-18 DIAGNOSIS — R10.9 ABDOMINAL PAIN, UNSPECIFIED ABDOMINAL LOCATION: ICD-10-CM

## 2024-11-18 LAB
ALBUMIN SERPL BCG-MCNC: 4.4 G/DL (ref 3.5–5.2)
ALBUMIN UR-MCNC: 30 MG/DL
ALP SERPL-CCNC: 85 U/L (ref 40–150)
ALT SERPL W P-5'-P-CCNC: 16 U/L (ref 0–50)
ANION GAP SERPL CALCULATED.3IONS-SCNC: 12 MMOL/L (ref 7–15)
APPEARANCE UR: CLEAR
AST SERPL W P-5'-P-CCNC: 23 U/L (ref 0–45)
BASOPHILS # BLD AUTO: 0 10E3/UL (ref 0–0.2)
BASOPHILS NFR BLD AUTO: 0 %
BILIRUB SERPL-MCNC: 0.3 MG/DL
BILIRUB UR QL STRIP: NEGATIVE
BUN SERPL-MCNC: 21.7 MG/DL (ref 8–23)
CALCIUM SERPL-MCNC: 9.7 MG/DL (ref 8.8–10.4)
CHLORIDE SERPL-SCNC: 101 MMOL/L (ref 98–107)
COLOR UR AUTO: YELLOW
CREAT SERPL-MCNC: 0.95 MG/DL (ref 0.51–0.95)
EGFRCR SERPLBLD CKD-EPI 2021: 61 ML/MIN/1.73M2
EOSINOPHIL # BLD AUTO: 0.1 10E3/UL (ref 0–0.7)
EOSINOPHIL NFR BLD AUTO: 1 %
ERYTHROCYTE [DISTWIDTH] IN BLOOD BY AUTOMATED COUNT: 15.1 % (ref 10–15)
GLUCOSE SERPL-MCNC: 93 MG/DL (ref 70–99)
GLUCOSE UR STRIP-MCNC: NEGATIVE MG/DL
HCO3 SERPL-SCNC: 25 MMOL/L (ref 22–29)
HCT VFR BLD AUTO: 43.1 % (ref 35–47)
HGB BLD-MCNC: 13.8 G/DL (ref 11.7–15.7)
HGB UR QL STRIP: NEGATIVE
HOLD SPECIMEN: NORMAL
IMM GRANULOCYTES # BLD: 0 10E3/UL
IMM GRANULOCYTES NFR BLD: 0 %
KETONES UR STRIP-MCNC: NEGATIVE MG/DL
LEUKOCYTE ESTERASE UR QL STRIP: ABNORMAL
LIPASE SERPL-CCNC: 30 U/L (ref 13–60)
LYMPHOCYTES # BLD AUTO: 2.5 10E3/UL (ref 0.8–5.3)
LYMPHOCYTES NFR BLD AUTO: 25 %
MCH RBC QN AUTO: 24.9 PG (ref 26.5–33)
MCHC RBC AUTO-ENTMCNC: 32 G/DL (ref 31.5–36.5)
MCV RBC AUTO: 78 FL (ref 78–100)
MONOCYTES # BLD AUTO: 0.9 10E3/UL (ref 0–1.3)
MONOCYTES NFR BLD AUTO: 9 %
MUCOUS THREADS #/AREA URNS LPF: PRESENT /LPF
NEUTROPHILS # BLD AUTO: 6.4 10E3/UL (ref 1.6–8.3)
NEUTROPHILS NFR BLD AUTO: 64 %
NITRATE UR QL: NEGATIVE
NRBC # BLD AUTO: 0 10E3/UL
NRBC BLD AUTO-RTO: 0 /100
PH UR STRIP: 5.5 [PH] (ref 4.7–8)
PLATELET # BLD AUTO: 238 10E3/UL (ref 150–450)
POTASSIUM SERPL-SCNC: 3.8 MMOL/L (ref 3.4–5.3)
PROT SERPL-MCNC: 7.5 G/DL (ref 6.4–8.3)
RBC # BLD AUTO: 5.54 10E6/UL (ref 3.8–5.2)
RBC URINE: 1 /HPF
SODIUM SERPL-SCNC: 138 MMOL/L (ref 135–145)
SP GR UR STRIP: 1.03 (ref 1–1.03)
SQUAMOUS EPITHELIAL: 2 /HPF
UROBILINOGEN UR STRIP-MCNC: NORMAL MG/DL
WBC # BLD AUTO: 10 10E3/UL (ref 4–11)
WBC URINE: 17 /HPF

## 2024-11-18 PROCEDURE — 36415 COLL VENOUS BLD VENIPUNCTURE: CPT | Performed by: PHYSICIAN ASSISTANT

## 2024-11-18 PROCEDURE — 80053 COMPREHEN METABOLIC PANEL: CPT | Performed by: PHYSICIAN ASSISTANT

## 2024-11-18 PROCEDURE — 99285 EMERGENCY DEPT VISIT HI MDM: CPT | Mod: 25

## 2024-11-18 PROCEDURE — G1010 CDSM STANSON: HCPCS

## 2024-11-18 PROCEDURE — 87086 URINE CULTURE/COLONY COUNT: CPT | Performed by: STUDENT IN AN ORGANIZED HEALTH CARE EDUCATION/TRAINING PROGRAM

## 2024-11-18 PROCEDURE — 250N000011 HC RX IP 250 OP 636: Performed by: STUDENT IN AN ORGANIZED HEALTH CARE EDUCATION/TRAINING PROGRAM

## 2024-11-18 PROCEDURE — 96374 THER/PROPH/DIAG INJ IV PUSH: CPT | Mod: XU

## 2024-11-18 PROCEDURE — 99284 EMERGENCY DEPT VISIT MOD MDM: CPT | Performed by: EMERGENCY MEDICINE

## 2024-11-18 PROCEDURE — 250N000013 HC RX MED GY IP 250 OP 250 PS 637: Performed by: STUDENT IN AN ORGANIZED HEALTH CARE EDUCATION/TRAINING PROGRAM

## 2024-11-18 PROCEDURE — 83690 ASSAY OF LIPASE: CPT | Performed by: PHYSICIAN ASSISTANT

## 2024-11-18 PROCEDURE — 85004 AUTOMATED DIFF WBC COUNT: CPT | Performed by: PHYSICIAN ASSISTANT

## 2024-11-18 PROCEDURE — 81001 URINALYSIS AUTO W/SCOPE: CPT | Performed by: STUDENT IN AN ORGANIZED HEALTH CARE EDUCATION/TRAINING PROGRAM

## 2024-11-18 PROCEDURE — 74177 CT ABD & PELVIS W/CONTRAST: CPT | Mod: MG

## 2024-11-18 PROCEDURE — 96375 TX/PRO/DX INJ NEW DRUG ADDON: CPT

## 2024-11-18 RX ORDER — METHYLPREDNISOLONE SODIUM SUCCINATE 125 MG/2ML
125 INJECTION INTRAMUSCULAR; INTRAVENOUS ONCE
Status: COMPLETED | OUTPATIENT
Start: 2024-11-18 | End: 2024-11-18

## 2024-11-18 RX ORDER — DIPHENHYDRAMINE HYDROCHLORIDE 50 MG/ML
50 INJECTION INTRAMUSCULAR; INTRAVENOUS ONCE
OUTPATIENT
Start: 2024-11-18 | End: 2024-11-18

## 2024-11-18 RX ORDER — KETOROLAC TROMETHAMINE 15 MG/ML
15 INJECTION, SOLUTION INTRAMUSCULAR; INTRAVENOUS ONCE
Status: COMPLETED | OUTPATIENT
Start: 2024-11-19 | End: 2024-11-19

## 2024-11-18 RX ORDER — IOPAMIDOL 755 MG/ML
74 INJECTION, SOLUTION INTRAVASCULAR ONCE
Status: COMPLETED | OUTPATIENT
Start: 2024-11-18 | End: 2024-11-18

## 2024-11-18 RX ORDER — METHYLPREDNISOLONE SODIUM SUCCINATE 125 MG/2ML
125 INJECTION INTRAMUSCULAR; INTRAVENOUS EVERY 4 HOURS
OUTPATIENT
Start: 2024-11-18 | End: 2024-11-19

## 2024-11-18 RX ORDER — ACETAMINOPHEN 325 MG/1
975 TABLET ORAL ONCE
Status: COMPLETED | OUTPATIENT
Start: 2024-11-18 | End: 2024-11-18

## 2024-11-18 RX ORDER — DIPHENHYDRAMINE HYDROCHLORIDE 50 MG/ML
50 INJECTION INTRAMUSCULAR; INTRAVENOUS
Status: COMPLETED | OUTPATIENT
Start: 2024-11-18 | End: 2024-11-18

## 2024-11-18 RX ORDER — METOCLOPRAMIDE HYDROCHLORIDE 5 MG/ML
10 INJECTION INTRAMUSCULAR; INTRAVENOUS ONCE
Status: COMPLETED | OUTPATIENT
Start: 2024-11-19 | End: 2024-11-19

## 2024-11-18 RX ORDER — ONDANSETRON 2 MG/ML
4 INJECTION INTRAMUSCULAR; INTRAVENOUS ONCE
Status: COMPLETED | OUTPATIENT
Start: 2024-11-18 | End: 2024-11-18

## 2024-11-18 RX ADMIN — IOPAMIDOL 74 ML: 755 INJECTION, SOLUTION INTRAVENOUS at 21:54

## 2024-11-18 RX ADMIN — ACETAMINOPHEN 975 MG: 325 TABLET, FILM COATED ORAL at 17:47

## 2024-11-18 RX ADMIN — METHYLPREDNISOLONE SODIUM SUCCINATE 125 MG: 125 INJECTION, POWDER, FOR SOLUTION INTRAMUSCULAR; INTRAVENOUS at 17:46

## 2024-11-18 RX ADMIN — DIPHENHYDRAMINE HYDROCHLORIDE 50 MG: 50 INJECTION, SOLUTION INTRAMUSCULAR; INTRAVENOUS at 17:47

## 2024-11-18 RX ADMIN — ONDANSETRON 4 MG: 2 INJECTION INTRAMUSCULAR; INTRAVENOUS at 17:45

## 2024-11-18 ASSESSMENT — ACTIVITIES OF DAILY LIVING (ADL)
ADLS_ACUITY_SCORE: 0

## 2024-11-18 ASSESSMENT — ENCOUNTER SYMPTOMS
SHORTNESS OF BREATH: 0
FEVER: 0
COUGH: 0
CHILLS: 0

## 2024-11-18 ASSESSMENT — COLUMBIA-SUICIDE SEVERITY RATING SCALE - C-SSRS
1. IN THE PAST MONTH, HAVE YOU WISHED YOU WERE DEAD OR WISHED YOU COULD GO TO SLEEP AND NOT WAKE UP?: NO
6. HAVE YOU EVER DONE ANYTHING, STARTED TO DO ANYTHING, OR PREPARED TO DO ANYTHING TO END YOUR LIFE?: NO
2. HAVE YOU ACTUALLY HAD ANY THOUGHTS OF KILLING YOURSELF IN THE PAST MONTH?: NO

## 2024-11-18 NOTE — ED TRIAGE NOTES
Pt is now having left sided abdominal pain, was seen yesterday for a headache. Pt has had several abdominal surgeries in the past. Pt last bowel movement was about 4 hours.

## 2024-11-18 NOTE — ED PROVIDER NOTES
"  History     Chief Complaint   Patient presents with    Abdominal Pain     HPI  Cee Pereira is a 79 year old female with complicated past medical history including Crohn's disease, recurrent small bowel obstructions, intra-abdominal abscess status post colectomy with anastomosis, presenting with abdominal pain.  She has felt generally unwell for around 4 days with nausea and decreased p.o. intake.  Her last bowel movement was approximately 2 hours ago.  She also has had a headache and fatigue for the past 4 days.  She was evaluated for this with a head CT yesterday and continues to have a mild headache.  No chest pain or shortness of breath. No fevers. No vomiting. No radiation of pain to back. She feels like there is a spot on her abdomen where she had a previous scar that is \"hard\" today. No blood in stool.    Allergies:  Allergies   Allergen Reactions    Diatrizoate      Other reaction(s): *Unknown    Iodides      Other reaction(s): *Unknown  Tincture    Ivp Dye [Contrast Dye]      Anaphylaxis    Lortab [Hydrocodone-Acetaminophen] Itching     headaches    No Clinical Screening - See Comments Itching    Pioglitazone Itching       Problem List:    Patient Active Problem List    Diagnosis Date Noted    Intra-abdominal abscess (H) 09/05/2019     Priority: Medium    LUQ abdominal pain 09/18/2018     Priority: Medium    GERD (gastroesophageal reflux disease) 09/18/2018     Priority: Medium    HTN (hypertension) 09/18/2018     Priority: Medium    Hyperlipidemia 09/18/2018     Priority: Medium    Viral gastroenteritis 05/23/2017     Priority: Medium    Dehydration 05/23/2017     Priority: Medium    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) 05/23/2017     Priority: Medium    Crohn's disease of large intestine without complication (H) 05/23/2017     Priority: Medium    Gastroenteritis 05/23/2017     Priority: Medium    Small bowel obstruction (H) 08/17/2016     Priority: Medium    SBO (small bowel " obstruction) (H) 08/17/2016     Priority: Medium    Lumbar stenosis 07/30/2013     Priority: Medium    Lumbar disc herniation with radiculopathy 08/03/2012     Priority: Medium        Past Medical History:    Past Medical History:   Diagnosis Date    Diabetes mellitus (H)     Diverticulitis     Herniated lumbar intervertebral disc        Past Surgical History:    Past Surgical History:   Procedure Laterality Date    APPENDECTOMY OPEN      CHOLECYSTECTOMY      COLECTOMY WITHOUT COLOSTOMY      for diverticulitis    DECOMPRESSION LUMBAR ONE LEVEL  7/31/2013    L4-L5    ESOPHAGOSCOPY, GASTROSCOPY, DUODENOSCOPY (EGD), COMBINED N/A 9/6/2019    Procedure: UPPER ENDOSCOPY WITH BIOPSY;  Surgeon: Froy Barraza MD;  Location: [facility]    JOINT REPLACEMENT      bilateral hip replacements       Family History:    No family history on file.    Social History:  Marital Status:   [2]  Social History     Tobacco Use    Smoking status: Former    Smokeless tobacco: Never   Substance Use Topics    Alcohol use: No        Medications:    acetaminophen (TYLENOL) 325 MG tablet  aspirin-dipyridamole (AGGRENOX)  MG per 12 hr capsule  Coenzyme Q10 (COQ-10 PO)  docusate sodium (COLACE) 100 MG capsule  GABAPENTIN PO  GABAPENTIN PO  hydroxychloroquine (PLAQUENIL) 200 MG tablet  hydrOXYzine (ATARAX) 25 MG tablet  magnesium oxide 200 MG TABS  METOPROLOL TARTRATE PO  PANTOPRAZOLE SODIUM PO  SEMAGLUTIDE,0.25 OR 0.5MG/DOS, SC  SIMVASTATIN PO  sucralfate (CARAFATE) 1 GM tablet          Review of Systems   All other systems reviewed and are negative.      Physical Exam   BP: (!) 179/101  Pulse: 79  Temp: 98.2  F (36.8  C)  Resp: 16  SpO2: 94 %      Physical Exam  Vitals and nursing note reviewed.   Constitutional:       General: She is not in acute distress.     Appearance: She is well-developed. She is not ill-appearing, toxic-appearing or diaphoretic.   HENT:      Head: Normocephalic and atraumatic.      Mouth/Throat:      Mouth:  Mucous membranes are moist.      Pharynx: Oropharynx is clear.   Eyes:      Extraocular Movements: Extraocular movements intact.      Pupils: Pupils are equal, round, and reactive to light.   Cardiovascular:      Rate and Rhythm: Normal rate and regular rhythm.   Pulmonary:      Effort: Pulmonary effort is normal.      Breath sounds: Normal breath sounds.   Abdominal:      General: Abdomen is flat. A surgical scar is present.      Palpations: Abdomen is soft.      Tenderness: There is abdominal tenderness. There is no guarding or rebound. Negative signs include Lawrence's sign and McBurney's sign.      Comments: Left of the umbilicus there is an area she describes as hard where I can feel fibrotic scarring. Difficult to assess if there may but a secondary mass there. She has no wincing or guarding to palpation but endorses tenderness.   Skin:     General: Skin is warm and dry.      Capillary Refill: Capillary refill takes less than 2 seconds.   Neurological:      General: No focal deficit present.      Mental Status: She is alert and oriented to person, place, and time.   Psychiatric:         Mood and Affect: Mood normal.         ED Course     ED Course as of 11/19/24 0117   Mon Nov 18, 2024   1843 Sign out given pending CT. If negative, okay for discharge home.   2355 CT scan w/o acute findings. Patient given report. I will push images to [facility], pt wants to f/u with their surgeons.     Procedures              Results for orders placed or performed during the hospital encounter of 11/18/24 (from the past 24 hours)   UA with Microscopic reflex to Culture    Specimen: Urine, Midstream   Result Value Ref Range    Color Urine Yellow Colorless, Straw, Light Yellow, Yellow    Appearance Urine Clear Clear    Glucose Urine Negative Negative mg/dL    Bilirubin Urine Negative Negative    Ketones Urine Negative Negative mg/dL    Specific Gravity Urine 1.031 1.003 - 1.035    Blood Urine Negative Negative    pH Urine 5.5 4.7 -  8.0    Protein Albumin Urine 30 (A) Negative mg/dL    Urobilinogen Urine Normal Normal, 2.0 mg/dL    Nitrite Urine Negative Negative    Leukocyte Esterase Urine Moderate (A) Negative    Mucus Urine Present (A) None Seen /LPF    RBC Urine 1 <=2 /HPF    WBC Urine 17 (H) <=5 /HPF    Squamous Epithelials Urine 2 (H) <=1 /HPF    Narrative    Urine Culture ordered based on laboratory criteria   CBC with platelets differential    Narrative    The following orders were created for panel order CBC with platelets differential.  Procedure                               Abnormality         Status                     ---------                               -----------         ------                     CBC with platelets and d...[[id number]]  Abnormal            Final result                 Please view results for these tests on the individual orders.   Comprehensive metabolic panel   Result Value Ref Range    Sodium 138 135 - 145 mmol/L    Potassium 3.8 3.4 - 5.3 mmol/L    Carbon Dioxide (CO2) 25 22 - 29 mmol/L    Anion Gap 12 7 - 15 mmol/L    Urea Nitrogen 21.7 8.0 - 23.0 mg/dL    Creatinine 0.95 0.51 - 0.95 mg/dL    GFR Estimate 61 >60 mL/min/1.73m2    Calcium 9.7 8.8 - 10.4 mg/dL    Chloride 101 98 - 107 mmol/L    Glucose 93 70 - 99 mg/dL    Alkaline Phosphatase 85 40 - 150 U/L    AST 23 0 - 45 U/L    ALT 16 0 - 50 U/L    Protein Total 7.5 6.4 - 8.3 g/dL    Albumin 4.4 3.5 - 5.2 g/dL    Bilirubin Total 0.3 <=1.2 mg/dL   Lipase   Result Value Ref Range    Lipase 30 13 - 60 U/L   CBC with platelets and differential   Result Value Ref Range    WBC Count 10.0 4.0 - 11.0 10e3/uL    RBC Count 5.54 (H) 3.80 - 5.20 10e6/uL    Hemoglobin 13.8 11.7 - 15.7 g/dL    Hematocrit 43.1 35.0 - 47.0 %    MCV 78 78 - 100 fL    MCH 24.9 (L) 26.5 - 33.0 pg    MCHC 32.0 31.5 - 36.5 g/dL    RDW 15.1 (H) 10.0 - 15.0 %    Platelet Count 238 150 - 450 10e3/uL    % Neutrophils 64 %    % Lymphocytes 25 %    % Monocytes 9 %    % Eosinophils 1 %    %  Basophils 0 %    % Immature Granulocytes 0 %    NRBCs per 100 WBC 0 <1 /100    Absolute Neutrophils 6.4 1.6 - 8.3 10e3/uL    Absolute Lymphocytes 2.5 0.8 - 5.3 10e3/uL    Absolute Monocytes 0.9 0.0 - 1.3 10e3/uL    Absolute Eosinophils 0.1 0.0 - 0.7 10e3/uL    Absolute Basophils 0.0 0.0 - 0.2 10e3/uL    Absolute Immature Granulocytes 0.0 <=0.4 10e3/uL    Absolute NRBCs 0.0 10e3/uL   Extra Tube    Narrative    The following orders were created for panel order Extra Tube.  Procedure                               Abnormality         Status                     ---------                               -----------         ------                     Extra Blue Top Tube[[id number]]                              Final result               Extra Red Top Tube[[id number]]                               Final result               Extra Heparinized Syringe[[id number]]                        Final result                 Please view results for these tests on the individual orders.   Extra Blue Top Tube   Result Value Ref Range    Hold Specimen JIC    Extra Red Top Tube   Result Value Ref Range    Hold Specimen JIC    Extra Heparinized Syringe   Result Value Ref Range    Hold Specimen OK        Medications   ondansetron (ZOFRAN) injection 4 mg (4 mg Intravenous $Given 11/18/24 1745)   acetaminophen (TYLENOL) tablet 975 mg (975 mg Oral $Given 11/18/24 1747)   methylPREDNISolone Na Suc (solu-MEDROL) injection 125 mg (125 mg Intravenous $Given 11/18/24 1746)   diphenhydrAMINE (BENADRYL) injection 50 mg (50 mg Intravenous $Given 11/18/24 1747)   iopamidol (ISOVUE-370) solution 74 mL (74 mLs Intravenous $Given 11/18/24 2154)   sodium chloride (PF) 0.9% PF flush 50 mL (50 mLs Intravenous $Given 11/18/24 2153)   metoclopramide (REGLAN) injection 10 mg (10 mg Intravenous $Given 11/19/24 0004)   ketorolac (TORADOL) injection 15 mg (15 mg Intravenous $Given 11/19/24 0002)       Assessments & Plan (with Medical Decision Making)     I have reviewed  the nursing notes.    79-year-old female with abdominal pain for the past several days.  She does have an extensive history of abdominal issues including small bowel obstruction, Crohn's, colectomy.  Given her history along with the presenting exam, will plan on CT.  She has anaphylaxis to contrast but contrast is indeed indicated in this case so will premedicate with methylprednisolone and Benadryl.  Will plan on pain control with ondansetron and oral Tylenol.  She is well-appearing, her abdominal exam is overall reassuring, but given the history, CT testing will be utilized to help rule out more severe pathology.  If workup is negative, appropriate for discharge home. Low suspicion for ischemic colitis or AAA based on hx and exam.    See ED Course.    I have reviewed the findings, diagnosis, plan and need for follow up with the patient.      Discharge Medication List as of 11/19/2024 12:09 AM          Final diagnoses:   Abdominal pain, unspecified abdominal location       11/18/2024   [facility]       Drew Iniguez MD  11/18/24 1844       Travon Cabral MD  11/19/24 0117

## 2024-11-18 NOTE — ED PROVIDER NOTES
History     Chief Complaint   Patient presents with    Headache     HPI  Cee Pereira is a 79 year old female who is here with headache.  Gradual onset a few days ago and has been persistent. Took no meds prior to arrival.  No vomiting.  No dizziness.  No vision changes.  Onset of headache was gradual, not sudden onset.    Allergies:  Allergies   Allergen Reactions    Diatrizoate      Other reaction(s): *Unknown    Iodides      Other reaction(s): *Unknown  Tincture    Ivp Dye [Contrast Dye]      Anaphylaxis    Lortab [Hydrocodone-Acetaminophen] Itching     headaches    No Clinical Screening - See Comments Itching    Pioglitazone Itching       Problem List:    Patient Active Problem List    Diagnosis Date Noted    Intra-abdominal abscess (H) 09/05/2019     Priority: Medium    LUQ abdominal pain 09/18/2018     Priority: Medium    GERD (gastroesophageal reflux disease) 09/18/2018     Priority: Medium    HTN (hypertension) 09/18/2018     Priority: Medium    Hyperlipidemia 09/18/2018     Priority: Medium    Viral gastroenteritis 05/23/2017     Priority: Medium    Dehydration 05/23/2017     Priority: Medium    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) 05/23/2017     Priority: Medium    Crohn's disease of large intestine without complication (H) 05/23/2017     Priority: Medium    Gastroenteritis 05/23/2017     Priority: Medium    Small bowel obstruction (H) 08/17/2016     Priority: Medium    SBO (small bowel obstruction) (H) 08/17/2016     Priority: Medium    Lumbar stenosis 07/30/2013     Priority: Medium    Lumbar disc herniation with radiculopathy 08/03/2012     Priority: Medium        Past Medical History:    Past Medical History:   Diagnosis Date    Diabetes mellitus (H)     Diverticulitis     Herniated lumbar intervertebral disc        Past Surgical History:    Past Surgical History:   Procedure Laterality Date    APPENDECTOMY OPEN      CHOLECYSTECTOMY      COLECTOMY WITHOUT COLOSTOMY      for  diverticulitis    DECOMPRESSION LUMBAR ONE LEVEL  7/31/2013    L4-L5    ESOPHAGOSCOPY, GASTROSCOPY, DUODENOSCOPY (EGD), COMBINED N/A 9/6/2019    Procedure: UPPER ENDOSCOPY WITH BIOPSY;  Surgeon: Froy Barraza MD;  Location: [facility]    JOINT REPLACEMENT      bilateral hip replacements       Family History:    No family history on file.    Social History:  Marital Status:   [2]  Social History     Tobacco Use    Smoking status: Former    Smokeless tobacco: Never   Substance Use Topics    Alcohol use: No        Medications:    acetaminophen (TYLENOL) 325 MG tablet  aspirin-dipyridamole (AGGRENOX)  MG per 12 hr capsule  Coenzyme Q10 (COQ-10 PO)  docusate sodium (COLACE) 100 MG capsule  GABAPENTIN PO  GABAPENTIN PO  hydroxychloroquine (PLAQUENIL) 200 MG tablet  hydrOXYzine (ATARAX) 25 MG tablet  magnesium oxide 200 MG TABS  METOPROLOL TARTRATE PO  PANTOPRAZOLE SODIUM PO  SEMAGLUTIDE,0.25 OR 0.5MG/DOS, SC  SIMVASTATIN PO  sucralfate (CARAFATE) 1 GM tablet          Review of Systems   Constitutional:  Negative for chills and fever.   Respiratory:  Negative for cough and shortness of breath.    All other systems reviewed and are negative.      Physical Exam   BP: (!) 181/91  Pulse: 87  Temp: 97.8  F (36.6  C)  Resp: 20  SpO2: 96 %      Physical Exam  Constitutional:       General: She is not in acute distress.     Appearance: She is not diaphoretic.   HENT:      Head: Normocephalic and atraumatic.      Right Ear: External ear normal.      Left Ear: External ear normal.      Nose: No congestion or rhinorrhea.      Mouth/Throat:      Pharynx: Oropharynx is clear. No oropharyngeal exudate.   Eyes:      General: No scleral icterus.     Pupils: Pupils are equal, round, and reactive to light.   Cardiovascular:      Rate and Rhythm: Normal rate and regular rhythm.      Heart sounds: Normal heart sounds.   Pulmonary:      Effort: No respiratory distress.      Breath sounds: Normal breath sounds.   Abdominal:       General: Bowel sounds are normal.      Palpations: Abdomen is soft.      Tenderness: There is no abdominal tenderness.   Musculoskeletal:         General: No tenderness.      Cervical back: Normal range of motion and neck supple.      Right lower leg: No edema.      Left lower leg: No edema.   Skin:     General: Skin is warm.      Capillary Refill: Capillary refill takes less than 2 seconds.      Findings: No rash.   Neurological:      Mental Status: Mental status is at baseline.      Cranial Nerves: No cranial nerve deficit.      Comments: CN II-XII intact, 5/5 strength to UE and LE, gait steady, finer to nose quick and coordinated, no gross neuro focal deficits     Psychiatric:         Mood and Affect: Mood normal.         Behavior: Behavior normal.         ED Course     ED Course as of 11/18/24 0621   Sun Nov 17, 2024   0604 Reglan improved HA, no bleed on my read, will give toradol     Procedures             Critical Care time:               No results found for this or any previous visit (from the past 24 hours).    Medications   metoclopramide (REGLAN) injection 10 mg (10 mg Intravenous $Given 11/17/24 0533)   ketorolac (TORADOL) injection 15 mg (15 mg Intravenous $Given 11/17/24 0601)       Assessments & Plan (with Medical Decision Making)     I have reviewed the nursing notes.    I have reviewed the findings, diagnosis, plan and need for follow up with the patient.          Medical Decision Making  The patient's presentation was of low complexity (an acute and uncomplicated illness or injury).    The patient's evaluation involved:  ordering and/or review of 3+ test(s) in this encounter (see separate area of note for details)    The patient's management necessitated moderate risk (prescription drug management including medications given in the ED).    79-year-old female here with headache for several days.  Given severity, concern for bleed.  None identified on Noncon.  Unremarkable neuroexam.  I feel  comfortable stopping at this point not getting CTA neck to rule out aneurysm as her pain resolved after Reglan and Toradol.    Discharge Medication List as of 11/17/2024  6:40 AM          Final diagnoses:   Acute nonintractable headache, unspecified headache type       11/17/2024   [facility]       Travon Cabral MD  11/18/24 0695

## 2024-11-19 VITALS
TEMPERATURE: 98.2 F | DIASTOLIC BLOOD PRESSURE: 100 MMHG | RESPIRATION RATE: 16 BRPM | OXYGEN SATURATION: 96 % | HEART RATE: 87 BPM | SYSTOLIC BLOOD PRESSURE: 171 MMHG

## 2024-11-19 PROCEDURE — 96375 TX/PRO/DX INJ NEW DRUG ADDON: CPT

## 2024-11-19 PROCEDURE — 250N000011 HC RX IP 250 OP 636: Performed by: EMERGENCY MEDICINE

## 2024-11-19 RX ADMIN — KETOROLAC TROMETHAMINE 15 MG: 15 INJECTION, SOLUTION INTRAMUSCULAR; INTRAVENOUS at 00:02

## 2024-11-19 RX ADMIN — METOCLOPRAMIDE 10 MG: 5 INJECTION, SOLUTION INTRAMUSCULAR; INTRAVENOUS at 00:04

## 2024-11-19 NOTE — ED NOTES
Pt walked to the bathroom independently. New linens put on bed and pt due to being wet from a rag. Repo'd in bed, call light within reach

## 2024-11-19 NOTE — ED NOTES
Face to face report given with opportunity to observe patient.    Report given to AYAKA Owen RN   11/18/2024  9:15 PM

## 2024-11-20 LAB — BACTERIA UR CULT: NORMAL

## 2025-03-05 NOTE — ED NOTES
Patient states she developed some left sided abdominal pain around 1900 tonight. States she did try Toradol x 1 at home with no relief. States she had pizza earlier and believs this may be what caused her pain.  She also states she has a history of small bowel obstruction and this oain is very similar and she thinks she may have same now. States she doesn't have a colon.  States she has had bowel movement x 2 today and they were both similar to normal .   
<-- Click to add NO significant Past Surgical History

## 2025-03-21 ENCOUNTER — HOSPITAL ENCOUNTER (EMERGENCY)
Facility: HOSPITAL | Age: 80
Discharge: HOME | End: 2025-03-21
Attending: EMERGENCY MEDICINE
Payer: MEDICARE

## 2025-03-21 ENCOUNTER — APPOINTMENT (OUTPATIENT)
Dept: CT IMAGING | Facility: HOSPITAL | Age: 80
End: 2025-03-21
Attending: EMERGENCY MEDICINE
Payer: MEDICARE

## 2025-03-21 VITALS
BODY MASS INDEX: 21.8 KG/M2 | RESPIRATION RATE: 16 BRPM | TEMPERATURE: 96.8 F | SYSTOLIC BLOOD PRESSURE: 190 MMHG | DIASTOLIC BLOOD PRESSURE: 87 MMHG | WEIGHT: 147.16 LBS | HEIGHT: 69 IN | OXYGEN SATURATION: 97 % | HEART RATE: 70 BPM

## 2025-03-21 DIAGNOSIS — R07.9 CHEST PAIN, UNSPECIFIED TYPE: ICD-10-CM

## 2025-03-21 LAB
ALBUMIN SERPL BCG-MCNC: 3.7 G/DL (ref 3.5–5.2)
ALP SERPL-CCNC: 90 U/L (ref 40–150)
ALT SERPL W P-5'-P-CCNC: 18 U/L (ref 0–50)
ANION GAP SERPL CALCULATED.3IONS-SCNC: 13 MMOL/L (ref 7–15)
AST SERPL W P-5'-P-CCNC: 22 U/L (ref 0–45)
BASOPHILS # BLD AUTO: 0 10E3/UL (ref 0–0.2)
BASOPHILS NFR BLD AUTO: 1 %
BILIRUB SERPL-MCNC: 0.2 MG/DL
BUN SERPL-MCNC: 17.6 MG/DL (ref 8–23)
CALCIUM SERPL-MCNC: 9.1 MG/DL (ref 8.8–10.4)
CHLORIDE SERPL-SCNC: 104 MMOL/L (ref 98–107)
CREAT SERPL-MCNC: 0.98 MG/DL (ref 0.51–0.95)
EGFRCR SERPLBLD CKD-EPI 2021: 59 ML/MIN/1.73M2
EOSINOPHIL # BLD AUTO: 0.2 10E3/UL (ref 0–0.7)
EOSINOPHIL NFR BLD AUTO: 4 %
ERYTHROCYTE [DISTWIDTH] IN BLOOD BY AUTOMATED COUNT: 14.9 % (ref 10–15)
GLUCOSE SERPL-MCNC: 99 MG/DL (ref 70–99)
HCO3 SERPL-SCNC: 24 MMOL/L (ref 22–29)
HCT VFR BLD AUTO: 37.4 % (ref 35–47)
HGB BLD-MCNC: 12 G/DL (ref 11.7–15.7)
HOLD SPECIMEN: NORMAL
IMM GRANULOCYTES # BLD: 0 10E3/UL
IMM GRANULOCYTES NFR BLD: 0 %
LIPASE SERPL-CCNC: 32 U/L (ref 13–60)
LYMPHOCYTES # BLD AUTO: 1.4 10E3/UL (ref 0.8–5.3)
LYMPHOCYTES NFR BLD AUTO: 30 %
MAGNESIUM SERPL-MCNC: 1.7 MG/DL (ref 1.7–2.3)
MCH RBC QN AUTO: 25 PG (ref 26.5–33)
MCHC RBC AUTO-ENTMCNC: 32.1 G/DL (ref 31.5–36.5)
MCV RBC AUTO: 78 FL (ref 78–100)
MONOCYTES # BLD AUTO: 0.5 10E3/UL (ref 0–1.3)
MONOCYTES NFR BLD AUTO: 11 %
NEUTROPHILS # BLD AUTO: 2.6 10E3/UL (ref 1.6–8.3)
NEUTROPHILS NFR BLD AUTO: 55 %
NRBC # BLD AUTO: 0 10E3/UL
NRBC BLD AUTO-RTO: 0 /100
PLATELET # BLD AUTO: 162 10E3/UL (ref 150–450)
POTASSIUM SERPL-SCNC: 4.1 MMOL/L (ref 3.4–5.3)
PROT SERPL-MCNC: 6.9 G/DL (ref 6.4–8.3)
RBC # BLD AUTO: 4.8 10E6/UL (ref 3.8–5.2)
SODIUM SERPL-SCNC: 141 MMOL/L (ref 135–145)
TROPONIN T SERPL HS-MCNC: 10 NG/L
TROPONIN T SERPL HS-MCNC: 10 NG/L
WBC # BLD AUTO: 4.7 10E3/UL (ref 4–11)

## 2025-03-21 PROCEDURE — 36415 COLL VENOUS BLD VENIPUNCTURE: CPT | Performed by: EMERGENCY MEDICINE

## 2025-03-21 PROCEDURE — 80053 COMPREHEN METABOLIC PANEL: CPT | Performed by: EMERGENCY MEDICINE

## 2025-03-21 PROCEDURE — 99284 EMERGENCY DEPT VISIT MOD MDM: CPT | Performed by: EMERGENCY MEDICINE

## 2025-03-21 PROCEDURE — 93010 ELECTROCARDIOGRAM REPORT: CPT | Performed by: INTERNAL MEDICINE

## 2025-03-21 PROCEDURE — 83690 ASSAY OF LIPASE: CPT | Performed by: EMERGENCY MEDICINE

## 2025-03-21 PROCEDURE — 85025 COMPLETE CBC W/AUTO DIFF WBC: CPT | Performed by: EMERGENCY MEDICINE

## 2025-03-21 PROCEDURE — 83735 ASSAY OF MAGNESIUM: CPT | Performed by: EMERGENCY MEDICINE

## 2025-03-21 PROCEDURE — 84484 ASSAY OF TROPONIN QUANT: CPT | Performed by: EMERGENCY MEDICINE

## 2025-03-21 PROCEDURE — 99285 EMERGENCY DEPT VISIT HI MDM: CPT | Mod: 25

## 2025-03-21 PROCEDURE — 93005 ELECTROCARDIOGRAM TRACING: CPT

## 2025-03-21 PROCEDURE — 71250 CT THORAX DX C-: CPT

## 2025-03-21 ASSESSMENT — ACTIVITIES OF DAILY LIVING (ADL)
ADLS_ACUITY_SCORE: 48

## 2025-03-21 NOTE — ED TRIAGE NOTES
Patient to room 2, changed into gown and call light with in reach.    Patient having chest pain on left side, up into left neck and into back since last night. Denies shortness of breath or sweating. Is also tender over ribs. Denies falls.      Triage Assessment (Adult)       Row Name 03/21/25 0812          Triage Assessment    Airway WDL WDL        Respiratory WDL    Respiratory WDL WDL        Skin Circulation/Temperature WDL    Skin Circulation/Temperature WDL WDL        Cardiac WDL    Cardiac WDL X;chest pain        Chest Pain Assessment    Chest Pain Location anterior chest, left     Chest Pain Radiation back;neck;jaw     Character sharp;pressure     Precipitating Factors nothing     Alleviating Factors nothing     Chest Pain Intervention cardiac biomarkers drawn;cardiac monitoring continued;cardiac monitor placed;12-lead ECG obtained;activity minimized        Peripheral/Neurovascular WDL    Peripheral Neurovascular WDL WDL        Cognitive/Neuro/Behavioral WDL    Cognitive/Neuro/Behavioral WDL WDL

## 2025-03-21 NOTE — ED PROVIDER NOTES
History     Chief Complaint   Patient presents with    Chest Pain     HPI  Cee Pereira is a 79 year old female who presents because of episodes of chest pain.  Yesterday afternoon she was walking across her kitchen had sudden severe 10 out of 10 sharp pain in her left anterior lower thorax that caused her to buckle her knees.  It only lasted a few seconds then resolved.  She has had about 8 episodes since some as severe most not long lasting a few seconds.  She does not remember any trauma.  A week ago she notes she was having some picky anterior parasternal chest pains bilaterally that would come and go few seconds and discomfort between her shoulder blades.  She is currently pain-free.  No associated shortness of breath no pleuritic chest pain no palpitations presyncope diaphoresis or nausea.  Pain worse with movement    Allergies:  Allergies   Allergen Reactions    Diatrizoate      Other reaction(s): *Unknown    Iodides      Other reaction(s): *Unknown  Tincture    Ivp Dye [Contrast Dye]      Anaphylaxis    Lortab [Hydrocodone-Acetaminophen] Itching     headaches    No Clinical Screening - See Comments Itching    Pioglitazone Itching       Problem List:    Patient Active Problem List    Diagnosis Date Noted    Intra-abdominal abscess (H) 09/05/2019     Priority: Medium    LUQ abdominal pain 09/18/2018     Priority: Medium    GERD (gastroesophageal reflux disease) 09/18/2018     Priority: Medium    HTN (hypertension) 09/18/2018     Priority: Medium    Hyperlipidemia 09/18/2018     Priority: Medium    Viral gastroenteritis 05/23/2017     Priority: Medium    Dehydration 05/23/2017     Priority: Medium    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) 05/23/2017     Priority: Medium    Crohn's disease of large intestine without complication (H) 05/23/2017     Priority: Medium    Gastroenteritis 05/23/2017     Priority: Medium    Small bowel obstruction (H) 08/17/2016     Priority: Medium    SBO (small  "bowel obstruction) (H) 08/17/2016     Priority: Medium    Lumbar stenosis 07/30/2013     Priority: Medium    Lumbar disc herniation with radiculopathy 08/03/2012     Priority: Medium        Past Medical History:    Past Medical History:   Diagnosis Date    Diabetes mellitus (H)     Diverticulitis     Herniated lumbar intervertebral disc        Past Surgical History:    Past Surgical History:   Procedure Laterality Date    APPENDECTOMY OPEN      CHOLECYSTECTOMY      COLECTOMY WITHOUT COLOSTOMY      for diverticulitis    DECOMPRESSION LUMBAR ONE LEVEL  7/31/2013    L4-L5    ESOPHAGOSCOPY, GASTROSCOPY, DUODENOSCOPY (EGD), COMBINED N/A 9/6/2019    Procedure: UPPER ENDOSCOPY WITH BIOPSY;  Surgeon: Froy Barraza MD;  Location: [facility]    JOINT REPLACEMENT      bilateral hip replacements       Family History:    No family history on file.    Social History:  Marital Status:   [2]  Social History     Tobacco Use    Smoking status: Former    Smokeless tobacco: Never   Substance Use Topics    Alcohol use: No        Medications:    acetaminophen (TYLENOL) 325 MG tablet  aspirin-dipyridamole (AGGRENOX)  MG per 12 hr capsule  Coenzyme Q10 (COQ-10 PO)  docusate sodium (COLACE) 100 MG capsule  GABAPENTIN PO  GABAPENTIN PO  hydroxychloroquine (PLAQUENIL) 200 MG tablet  hydrOXYzine (ATARAX) 25 MG tablet  magnesium oxide 200 MG TABS  METOPROLOL TARTRATE PO  PANTOPRAZOLE SODIUM PO  SEMAGLUTIDE,0.25 OR 0.5MG/DOS, SC  SIMVASTATIN PO  sucralfate (CARAFATE) 1 GM tablet          Review of Systems   All other systems reviewed and are negative.      Physical Exam   BP: (!) 192/95  Pulse: 75  Temp: 96.8  F (36  C)  Resp: 16  Height: 175.3 cm (5' 9\")  Weight: 66.7 kg (147 lb 2.5 oz)  SpO2: 95 %      Physical Exam  Vitals and nursing note reviewed.   Constitutional:       General: She is not in acute distress.     Appearance: Normal appearance. She is well-developed. She is not ill-appearing, toxic-appearing or diaphoretic. "      Comments: Pale   HENT:      Head: Normocephalic and atraumatic.      Mouth/Throat:      Mouth: Mucous membranes are moist.   Eyes:      General: No scleral icterus.     Extraocular Movements: Extraocular movements intact.      Conjunctiva/sclera: Conjunctivae normal.   Cardiovascular:      Rate and Rhythm: Normal rate and regular rhythm.      Heart sounds: Normal heart sounds.   Pulmonary:      Effort: Pulmonary effort is normal. No respiratory distress.      Breath sounds: Normal breath sounds.   Abdominal:      General: Abdomen is flat. Bowel sounds are normal.      Palpations: Abdomen is soft.   Musculoskeletal:      Cervical back: Normal range of motion and neck supple.      Right lower leg: No edema.      Left lower leg: No edema.   Skin:     General: Skin is warm and dry.      Capillary Refill: Capillary refill takes less than 2 seconds.      Coloration: Skin is pale.      Findings: No rash.   Neurological:      General: No focal deficit present.      Mental Status: She is alert and oriented to person, place, and time.   Psychiatric:         Mood and Affect: Mood normal.         Behavior: Behavior normal.         ED Course        Procedures              Critical Care time:  none     None       EKG done at 0 59 reviewed at 0 59 agree with below interpretation  Results for orders placed or performed during the hospital encounter of 03/21/25 (from the past 24 hours)   EKG 12-lead, tracing only   Result Value Ref Range    Systolic Blood Pressure  mmHg    Diastolic Blood Pressure  mmHg    Ventricular Rate 76 BPM    Atrial Rate 76 BPM    NV Interval 154 ms    QRS Duration 88 ms     ms    QTc 429 ms    P Axis 79 degrees    R AXIS -12 degrees    T Axis 74 degrees    Interpretation ECG       Sinus rhythm  Possible Anterior infarct , age undetermined  Abnormal ECG  When compared with ECG of 21-Feb-2024 10:05,  No significant change was found     CBC with platelets differential    Narrative    The following  orders were created for panel order CBC with platelets differential.  Procedure                               Abnormality         Status                     ---------                               -----------         ------                     CBC with platelets and ...[[id number]]  Abnormal            Final result                 Please view results for these tests on the individual orders.   Comprehensive metabolic panel   Result Value Ref Range    Sodium 141 135 - 145 mmol/L    Potassium 4.1 3.4 - 5.3 mmol/L    Carbon Dioxide (CO2) 24 22 - 29 mmol/L    Anion Gap 13 7 - 15 mmol/L    Urea Nitrogen 17.6 8.0 - 23.0 mg/dL    Creatinine 0.98 (H) 0.51 - 0.95 mg/dL    GFR Estimate 59 (L) >60 mL/min/1.73m2    Calcium 9.1 8.8 - 10.4 mg/dL    Chloride 104 98 - 107 mmol/L    Glucose 99 70 - 99 mg/dL    Alkaline Phosphatase 90 40 - 150 U/L    AST 22 0 - 45 U/L    ALT 18 0 - 50 U/L    Protein Total 6.9 6.4 - 8.3 g/dL    Albumin 3.7 3.5 - 5.2 g/dL    Bilirubin Total 0.2 <=1.2 mg/dL   Lipase   Result Value Ref Range    Lipase 32 13 - 60 U/L   Troponin T, High Sensitivity   Result Value Ref Range    Troponin T, High Sensitivity 10 <=14 ng/L   Magnesium   Result Value Ref Range    Magnesium 1.7 1.7 - 2.3 mg/dL   [place] Draw    Narrative    The following orders were created for panel order [place] [facility].  Procedure                               Abnormality         Status                     ---------                               -----------         ------                     Extra Blue Top Tube[[id number]]                             Final result               Extra Red Top Tube[[id number]]                              Final result               Extra Heparinized Syringe[[id number]]                       Final result                 Please view results for these tests on the individual orders.   CBC with platelets and differential   Result Value Ref Range    WBC Count 4.7 4.0 - 11.0 10e3/uL    RBC Count 4.80 3.80 - 5.20 10e6/uL     Hemoglobin 12.0 11.7 - 15.7 g/dL    Hematocrit 37.4 35.0 - 47.0 %    MCV 78 78 - 100 fL    MCH 25.0 (L) 26.5 - 33.0 pg    MCHC 32.1 31.5 - 36.5 g/dL    RDW 14.9 10.0 - 15.0 %    Platelet Count 162 150 - 450 10e3/uL    % Neutrophils 55 %    % Lymphocytes 30 %    % Monocytes 11 %    % Eosinophils 4 %    % Basophils 1 %    % Immature Granulocytes 0 %    NRBCs per 100 WBC 0 <1 /100    Absolute Neutrophils 2.6 1.6 - 8.3 10e3/uL    Absolute Lymphocytes 1.4 0.8 - 5.3 10e3/uL    Absolute Monocytes 0.5 0.0 - 1.3 10e3/uL    Absolute Eosinophils 0.2 0.0 - 0.7 10e3/uL    Absolute Basophils 0.0 0.0 - 0.2 10e3/uL    Absolute Immature Granulocytes 0.0 <=0.4 10e3/uL    Absolute NRBCs 0.0 10e3/uL   Extra Blue Top Tube   Result Value Ref Range    Hold Specimen JIC    Extra Red Top Tube   Result Value Ref Range    Hold Specimen JIC    Extra Heparinized Syringe   Result Value Ref Range    Hold Specimen JIC    CT Chest Abdomen Pelvis w/o Contrast    Narrative    PROCEDURE: CT CHEST ABDOMEN PELVIS W/O CONTRAST 3/21/2025 8:46 AM    HISTORY: chest pain, posterior thorax pain, and anterior lower rib  pain and tenderness, no trauma, LUQ abd pain    COMPARISONS: None.    Meds/Dose Given:    TECHNIQUE: CT scan of the chest abdomen and pelvis without IV contrast  sagittal coronal reconstructions    FINDINGS: CT chest: The lungs are clear. The hilar and mediastinal  lymph nodes are normal in caliber. The heart is normal in size.  Moderate coronary artery calcified axillary and supraclavicular lymph  nodes are normal. No fractures of the regional skeleton are seen.  There is a stimulator seen in the epidural space at the midthoracic  level.    CT scan of the abdomen and pelvis: The liver is free of masses or  biliary ductal enlargement. The gallbladder has been removed. Spleen  and pancreas are normal. The adrenal glands are normal. The right and  left kidneys are free of masses or hydronephrosis. A site of hernia  repair is seen in the anterior  abdominal wall. No intraperitoneal  masses or inflammatory changes are noted. No bowel abnormalities are  seen. The bladder was obscured by streak artifact from hip prostheses.  The rectum appears normal.    Degenerative changes are present in the thoracic and lumbar spine. The  sacrum and sacroiliac joints appear normal. No pelvic or proximal  femoral fracture is seen.         Impression    IMPRESSION: No acute chest abdomen or pelvic injury.    ROSENDO CORRIGAN MD         SYSTEM ID:  [id number]   Troponin T, High Sensitivity   Result Value Ref Range    Troponin T, High Sensitivity 10 <=14 ng/L       Medications - No data to display    Assessments & Plan (with Medical Decision Making)     I have reviewed the nursing notes.    I have reviewed the findings, diagnosis, plan and need for follow up with the patient.           Medical Decision Making  The patient's presentation was of high complexity (an acute health issue posing potential threat to life or bodily function).    The patient's evaluation involved:  ordering and/or review of 3+ test(s) in this encounter (EKG CT scan multiple lab investigations)    The patient's management necessitated moderate risk (79-year-old female who was having episodic chest pain required significant diagnostic evaluation.).    The patient is a 79 year old year old female with a past medical history as above of who presents to the ED with a complaint of episodes of left anterior lower thoracic pain that lasts a few seconds at a time.  History was obtained from the patient.  Presentation combined with history increase my concerns for myocardial infarction cardiac dysrhythmias, myocarditis.  Pneumothorax pneumonia neoplasm occult rib fracture perforated viscus it was decided necessary to order ED investigations in order to properly assess patient's acute emergent complaint.  My independent interpretation of revealed and is in agreement with radiology interpretation showing no acute  abnormality seen on chest abdomen pelvis noncontrast patient has history of anaphylaxis with IV contrast.  My independent review of EKG reveals no acute findings.  ED labs reveal serial troponins CBC chemistry profile unremarkable.  After prolonged ED observation interpretation of vital signs history physical ED studies feel the patient has chest wall pain likely chest wall strain causing some intermittent spasm she had some localized tenderness.  Shared decision-making was discussed in layman terms with the patient or caregiver and they agree with plan given that her pain is very short-lived oral analgesia is likely going to be ineffective she will try heat stretching exercises Tylenol if needed.  Follow-up with PCP return to ED if any concerns, strong return precautions were given.     New Prescriptions    No medications on file       Final diagnoses:   Chest pain, unspecified type       3/21/2025   [facility]       Tremaine Tran MD  03/21/25 1726

## 2025-03-21 NOTE — ED NOTES
Patient up to use bathroom. States that she got a brief chest pressure again. States it came and went in a few minutes.  Reported to

## 2025-03-22 LAB
ATRIAL RATE - MUSE: 76 BPM
DIASTOLIC BLOOD PRESSURE - MUSE: NORMAL MMHG
INTERPRETATION ECG - MUSE: NORMAL
P AXIS - MUSE: 79 DEGREES
PR INTERVAL - MUSE: 154 MS
QRS DURATION - MUSE: 88 MS
QT - MUSE: 382 MS
QTC - MUSE: 429 MS
R AXIS - MUSE: -12 DEGREES
SYSTOLIC BLOOD PRESSURE - MUSE: NORMAL MMHG
T AXIS - MUSE: 74 DEGREES
VENTRICULAR RATE- MUSE: 76 BPM

## 2025-06-27 ENCOUNTER — APPOINTMENT (OUTPATIENT)
Dept: CT IMAGING | Facility: HOSPITAL | Age: 80
End: 2025-06-27
Attending: STUDENT IN AN ORGANIZED HEALTH CARE EDUCATION/TRAINING PROGRAM
Payer: MEDICARE

## 2025-06-27 ENCOUNTER — HOSPITAL ENCOUNTER (EMERGENCY)
Facility: HOSPITAL | Age: 80
Discharge: OTHER INSTITUTION | End: 2025-06-27
Attending: STUDENT IN AN ORGANIZED HEALTH CARE EDUCATION/TRAINING PROGRAM
Payer: MEDICARE

## 2025-06-27 VITALS
RESPIRATION RATE: 20 BRPM | DIASTOLIC BLOOD PRESSURE: 95 MMHG | SYSTOLIC BLOOD PRESSURE: 174 MMHG | HEART RATE: 105 BPM | TEMPERATURE: 98.2 F | OXYGEN SATURATION: 95 %

## 2025-06-27 DIAGNOSIS — K56.609 SBO (SMALL BOWEL OBSTRUCTION) (H): ICD-10-CM

## 2025-06-27 LAB
ALBUMIN SERPL BCG-MCNC: 4.3 G/DL (ref 3.5–5.2)
ALP SERPL-CCNC: 107 U/L (ref 40–150)
ALT SERPL W P-5'-P-CCNC: 26 U/L (ref 0–50)
ANION GAP SERPL CALCULATED.3IONS-SCNC: 15 MMOL/L (ref 7–15)
AST SERPL W P-5'-P-CCNC: 33 U/L (ref 0–45)
BASOPHILS # BLD AUTO: 0 10E3/UL (ref 0–0.2)
BASOPHILS NFR BLD AUTO: 0 %
BILIRUB SERPL-MCNC: 0.4 MG/DL
BUN SERPL-MCNC: 19.5 MG/DL (ref 8–23)
CALCIUM SERPL-MCNC: 10.2 MG/DL (ref 8.8–10.4)
CHLORIDE SERPL-SCNC: 99 MMOL/L (ref 98–107)
CREAT SERPL-MCNC: 1.07 MG/DL (ref 0.51–0.95)
EGFRCR SERPLBLD CKD-EPI 2021: 53 ML/MIN/1.73M2
EOSINOPHIL # BLD AUTO: 0.1 10E3/UL (ref 0–0.7)
EOSINOPHIL NFR BLD AUTO: 1 %
ERYTHROCYTE [DISTWIDTH] IN BLOOD BY AUTOMATED COUNT: 15.9 % (ref 10–15)
GLUCOSE SERPL-MCNC: 112 MG/DL (ref 70–99)
HCO3 SERPL-SCNC: 24 MMOL/L (ref 22–29)
HCT VFR BLD AUTO: 47.2 % (ref 35–47)
HGB BLD-MCNC: 14.7 G/DL (ref 11.7–15.7)
HOLD SPECIMEN: NORMAL
IMM GRANULOCYTES # BLD: 0 10E3/UL
IMM GRANULOCYTES NFR BLD: 0 %
LIPASE SERPL-CCNC: 32 U/L (ref 13–60)
LYMPHOCYTES # BLD AUTO: 2.1 10E3/UL (ref 0.8–5.3)
LYMPHOCYTES NFR BLD AUTO: 22 %
MCH RBC QN AUTO: 24.6 PG (ref 26.5–33)
MCHC RBC AUTO-ENTMCNC: 31.1 G/DL (ref 31.5–36.5)
MCV RBC AUTO: 79 FL (ref 78–100)
MONOCYTES # BLD AUTO: 0.8 10E3/UL (ref 0–1.3)
MONOCYTES NFR BLD AUTO: 8 %
NEUTROPHILS # BLD AUTO: 6.7 10E3/UL (ref 1.6–8.3)
NEUTROPHILS NFR BLD AUTO: 68 %
NRBC # BLD AUTO: 0 10E3/UL
NRBC BLD AUTO-RTO: 0 /100
PLATELET # BLD AUTO: 250 10E3/UL (ref 150–450)
POTASSIUM SERPL-SCNC: 4 MMOL/L (ref 3.4–5.3)
PROT SERPL-MCNC: 7.9 G/DL (ref 6.4–8.3)
RBC # BLD AUTO: 5.97 10E6/UL (ref 3.8–5.2)
SODIUM SERPL-SCNC: 138 MMOL/L (ref 135–145)
TROPONIN T SERPL HS-MCNC: 11 NG/L
TROPONIN T SERPL HS-MCNC: 12 NG/L
WBC # BLD AUTO: 9.8 10E3/UL (ref 4–11)

## 2025-06-27 PROCEDURE — 99285 EMERGENCY DEPT VISIT HI MDM: CPT | Performed by: STUDENT IN AN ORGANIZED HEALTH CARE EDUCATION/TRAINING PROGRAM

## 2025-06-27 PROCEDURE — 99285 EMERGENCY DEPT VISIT HI MDM: CPT | Mod: 25

## 2025-06-27 PROCEDURE — 71250 CT THORAX DX C-: CPT | Mod: 26 | Performed by: RADIOLOGY

## 2025-06-27 PROCEDURE — 84484 ASSAY OF TROPONIN QUANT: CPT | Performed by: STUDENT IN AN ORGANIZED HEALTH CARE EDUCATION/TRAINING PROGRAM

## 2025-06-27 PROCEDURE — 74176 CT ABD & PELVIS W/O CONTRAST: CPT

## 2025-06-27 PROCEDURE — 96374 THER/PROPH/DIAG INJ IV PUSH: CPT

## 2025-06-27 PROCEDURE — 93005 ELECTROCARDIOGRAM TRACING: CPT

## 2025-06-27 PROCEDURE — 83690 ASSAY OF LIPASE: CPT | Performed by: STUDENT IN AN ORGANIZED HEALTH CARE EDUCATION/TRAINING PROGRAM

## 2025-06-27 PROCEDURE — 71250 CT THORAX DX C-: CPT

## 2025-06-27 PROCEDURE — 96375 TX/PRO/DX INJ NEW DRUG ADDON: CPT

## 2025-06-27 PROCEDURE — 82310 ASSAY OF CALCIUM: CPT | Performed by: STUDENT IN AN ORGANIZED HEALTH CARE EDUCATION/TRAINING PROGRAM

## 2025-06-27 PROCEDURE — 85004 AUTOMATED DIFF WBC COUNT: CPT | Performed by: STUDENT IN AN ORGANIZED HEALTH CARE EDUCATION/TRAINING PROGRAM

## 2025-06-27 PROCEDURE — 93010 ELECTROCARDIOGRAM REPORT: CPT | Performed by: INTERNAL MEDICINE

## 2025-06-27 PROCEDURE — 96376 TX/PRO/DX INJ SAME DRUG ADON: CPT

## 2025-06-27 PROCEDURE — 36415 COLL VENOUS BLD VENIPUNCTURE: CPT | Performed by: STUDENT IN AN ORGANIZED HEALTH CARE EDUCATION/TRAINING PROGRAM

## 2025-06-27 PROCEDURE — 250N000011 HC RX IP 250 OP 636: Performed by: STUDENT IN AN ORGANIZED HEALTH CARE EDUCATION/TRAINING PROGRAM

## 2025-06-27 PROCEDURE — 74176 CT ABD & PELVIS W/O CONTRAST: CPT | Mod: 26 | Performed by: RADIOLOGY

## 2025-06-27 RX ORDER — HYDROMORPHONE HYDROCHLORIDE 1 MG/ML
0.5 INJECTION, SOLUTION INTRAMUSCULAR; INTRAVENOUS; SUBCUTANEOUS EVERY 30 MIN PRN
Refills: 0 | Status: DISCONTINUED | OUTPATIENT
Start: 2025-06-27 | End: 2025-06-27 | Stop reason: HOSPADM

## 2025-06-27 RX ORDER — ONDANSETRON 2 MG/ML
4 INJECTION INTRAMUSCULAR; INTRAVENOUS EVERY 30 MIN PRN
Status: DISCONTINUED | OUTPATIENT
Start: 2025-06-27 | End: 2025-06-27 | Stop reason: HOSPADM

## 2025-06-27 RX ADMIN — ONDANSETRON 4 MG: 2 INJECTION INTRAMUSCULAR; INTRAVENOUS at 15:24

## 2025-06-27 RX ADMIN — HYDROMORPHONE HYDROCHLORIDE 0.5 MG: 1 INJECTION, SOLUTION INTRAMUSCULAR; INTRAVENOUS; SUBCUTANEOUS at 15:25

## 2025-06-27 RX ADMIN — HYDROMORPHONE HYDROCHLORIDE 0.5 MG: 1 INJECTION, SOLUTION INTRAMUSCULAR; INTRAVENOUS; SUBCUTANEOUS at 17:23

## 2025-06-27 ASSESSMENT — ACTIVITIES OF DAILY LIVING (ADL)
ADLS_ACUITY_SCORE: 48

## 2025-06-27 NOTE — ED TRIAGE NOTES
Pt reports abdominal pain, and medial chest pain 5/10 since last night about 1600. Pt states she has a hx of a twisted intestines, and bowel obstructions. Pt does reports vomiting about 4x. Pt is very uncomfortable.

## 2025-06-28 NOTE — ED NOTES
Telephone report given to Anu Sloan @ [facility].   Pt left our facility @ [id number].

## 2025-06-30 LAB
ATRIAL RATE - MUSE: 97 BPM
DIASTOLIC BLOOD PRESSURE - MUSE: NORMAL MMHG
INTERPRETATION ECG - MUSE: NORMAL
P AXIS - MUSE: 79 DEGREES
PR INTERVAL - MUSE: 148 MS
QRS DURATION - MUSE: 84 MS
QT - MUSE: 358 MS
QTC - MUSE: 454 MS
R AXIS - MUSE: -25 DEGREES
SYSTOLIC BLOOD PRESSURE - MUSE: NORMAL MMHG
T AXIS - MUSE: 86 DEGREES
VENTRICULAR RATE- MUSE: 97 BPM

## (undated) DEVICE — IRRIGATION-H2O 1000ML BAG

## (undated) DEVICE — APPLICATOR-CHLORAPREP 26ML TINTED CHG 2%+ 70% IPA-SURGICAL

## (undated) DEVICE — MOUTHPIECE W/GUARD FOR ENDOSCOPY

## (undated) DEVICE — LIGHT HANDLE COVER

## (undated) DEVICE — TUBING-SUCTION 20FT

## (undated) DEVICE — CANISTER-SUCTION 2000CC

## (undated) RX ORDER — PROPOFOL 10 MG/ML
INJECTION, EMULSION INTRAVENOUS
Status: DISPENSED
Start: 2019-09-06

## (undated) RX ORDER — LIDOCAINE HYDROCHLORIDE 20 MG/ML
INJECTION, SOLUTION EPIDURAL; INFILTRATION; INTRACAUDAL; PERINEURAL
Status: DISPENSED
Start: 2019-09-06